# Patient Record
Sex: FEMALE | Race: WHITE | NOT HISPANIC OR LATINO | Employment: PART TIME | URBAN - METROPOLITAN AREA
[De-identification: names, ages, dates, MRNs, and addresses within clinical notes are randomized per-mention and may not be internally consistent; named-entity substitution may affect disease eponyms.]

---

## 2020-03-19 ENCOUNTER — HOSPITAL ENCOUNTER (EMERGENCY)
Facility: HOSPITAL | Age: 40
Discharge: HOME | End: 2020-03-19
Attending: EMERGENCY MEDICINE | Admitting: EMERGENCY MEDICINE

## 2020-03-19 ENCOUNTER — APPOINTMENT (EMERGENCY)
Dept: CT IMAGING | Facility: HOSPITAL | Age: 40
End: 2020-03-19

## 2020-03-19 VITALS
RESPIRATION RATE: 18 BRPM | OXYGEN SATURATION: 98 % | DIASTOLIC BLOOD PRESSURE: 102 MMHG | SYSTOLIC BLOOD PRESSURE: 178 MMHG | TEMPERATURE: 98.2 F | HEART RATE: 93 BPM | WEIGHT: 253.75 LBS

## 2020-03-19 DIAGNOSIS — M54.9 BACK PAIN: ICD-10-CM

## 2020-03-19 DIAGNOSIS — N83.202 CYST OF LEFT OVARY: ICD-10-CM

## 2020-03-19 DIAGNOSIS — R68.89 DOES NOT FEEL RIGHT: ICD-10-CM

## 2020-03-19 DIAGNOSIS — I10 HTN (HYPERTENSION): ICD-10-CM

## 2020-03-19 DIAGNOSIS — R42 DIZZINESS: Primary | ICD-10-CM

## 2020-03-19 DIAGNOSIS — R07.9 CHEST PAIN: ICD-10-CM

## 2020-03-19 LAB
ALBUMIN SERPL BCP-MCNC: 3.6 G/DL (ref 3.5–5)
ALP SERPL-CCNC: 64 U/L (ref 46–116)
ALT SERPL W P-5'-P-CCNC: 136 U/L (ref 12–78)
ANION GAP SERPL CALCULATED.3IONS-SCNC: 9 MMOL/L (ref 4–13)
AST SERPL W P-5'-P-CCNC: 97 U/L (ref 5–45)
ATRIAL RATE: 76 BPM
BACTERIA UR QL AUTO: ABNORMAL /HPF
BASOPHILS # BLD AUTO: 0.04 THOUSANDS/ΜL (ref 0–0.1)
BASOPHILS NFR BLD AUTO: 1 % (ref 0–1)
BILIRUB SERPL-MCNC: 0.4 MG/DL (ref 0.2–1)
BILIRUB UR QL STRIP: NEGATIVE
BUN SERPL-MCNC: 8 MG/DL (ref 5–25)
CALCIUM SERPL-MCNC: 9 MG/DL (ref 8.3–10.1)
CHLORIDE SERPL-SCNC: 101 MMOL/L (ref 100–108)
CLARITY UR: CLEAR
CO2 SERPL-SCNC: 27 MMOL/L (ref 21–32)
COLOR UR: YELLOW
CREAT SERPL-MCNC: 0.71 MG/DL (ref 0.6–1.3)
EOSINOPHIL # BLD AUTO: 0.08 THOUSAND/ΜL (ref 0–0.61)
EOSINOPHIL NFR BLD AUTO: 1 % (ref 0–6)
ERYTHROCYTE [DISTWIDTH] IN BLOOD BY AUTOMATED COUNT: 12.2 % (ref 11.6–15.1)
EXT PREG TEST URINE: NEGATIVE
EXT. CONTROL ED NAV: NORMAL
GFR SERPL CREATININE-BSD FRML MDRD: 106 ML/MIN/1.73SQ M
GLUCOSE SERPL-MCNC: 89 MG/DL (ref 65–140)
GLUCOSE UR STRIP-MCNC: NEGATIVE MG/DL
HCT VFR BLD AUTO: 45.6 % (ref 34.8–46.1)
HGB BLD-MCNC: 15.9 G/DL (ref 11.5–15.4)
HGB UR QL STRIP.AUTO: ABNORMAL
HYALINE CASTS #/AREA URNS LPF: ABNORMAL /LPF
IMM GRANULOCYTES # BLD AUTO: 0.03 THOUSAND/UL (ref 0–0.2)
IMM GRANULOCYTES NFR BLD AUTO: 0 % (ref 0–2)
KETONES UR STRIP-MCNC: NEGATIVE MG/DL
LEUKOCYTE ESTERASE UR QL STRIP: NEGATIVE
LYMPHOCYTES # BLD AUTO: 2.15 THOUSANDS/ΜL (ref 0.6–4.47)
LYMPHOCYTES NFR BLD AUTO: 26 % (ref 14–44)
MCH RBC QN AUTO: 35 PG (ref 26.8–34.3)
MCHC RBC AUTO-ENTMCNC: 34.9 G/DL (ref 31.4–37.4)
MCV RBC AUTO: 100 FL (ref 82–98)
MONOCYTES # BLD AUTO: 0.64 THOUSAND/ΜL (ref 0.17–1.22)
MONOCYTES NFR BLD AUTO: 8 % (ref 4–12)
NEUTROPHILS # BLD AUTO: 5.33 THOUSANDS/ΜL (ref 1.85–7.62)
NEUTS SEG NFR BLD AUTO: 64 % (ref 43–75)
NITRITE UR QL STRIP: NEGATIVE
NON-SQ EPI CELLS URNS QL MICRO: ABNORMAL /HPF
NRBC BLD AUTO-RTO: 0 /100 WBCS
P AXIS: 60 DEGREES
PH UR STRIP.AUTO: 6 [PH]
PLATELET # BLD AUTO: 188 THOUSANDS/UL (ref 149–390)
PMV BLD AUTO: 9.5 FL (ref 8.9–12.7)
POTASSIUM SERPL-SCNC: 4 MMOL/L (ref 3.5–5.3)
PR INTERVAL: 150 MS
PROT SERPL-MCNC: 7.7 G/DL (ref 6.4–8.2)
PROT UR STRIP-MCNC: NEGATIVE MG/DL
QRS AXIS: 34 DEGREES
QRSD INTERVAL: 80 MS
QT INTERVAL: 386 MS
QTC INTERVAL: 434 MS
RBC # BLD AUTO: 4.54 MILLION/UL (ref 3.81–5.12)
RBC #/AREA URNS AUTO: ABNORMAL /HPF
SODIUM SERPL-SCNC: 137 MMOL/L (ref 136–145)
SP GR UR STRIP.AUTO: <=1.005 (ref 1–1.03)
T WAVE AXIS: 65 DEGREES
UROBILINOGEN UR QL STRIP.AUTO: 0.2 E.U./DL
VENTRICULAR RATE: 76 BPM
WBC # BLD AUTO: 8.27 THOUSAND/UL (ref 4.31–10.16)
WBC #/AREA URNS AUTO: ABNORMAL /HPF

## 2020-03-19 PROCEDURE — 96361 HYDRATE IV INFUSION ADD-ON: CPT

## 2020-03-19 PROCEDURE — 93010 ELECTROCARDIOGRAM REPORT: CPT | Performed by: INTERNAL MEDICINE

## 2020-03-19 PROCEDURE — 99284 EMERGENCY DEPT VISIT MOD MDM: CPT

## 2020-03-19 PROCEDURE — 85025 COMPLETE CBC W/AUTO DIFF WBC: CPT | Performed by: NURSE PRACTITIONER

## 2020-03-19 PROCEDURE — 80053 COMPREHEN METABOLIC PANEL: CPT | Performed by: NURSE PRACTITIONER

## 2020-03-19 PROCEDURE — 36415 COLL VENOUS BLD VENIPUNCTURE: CPT | Performed by: NURSE PRACTITIONER

## 2020-03-19 PROCEDURE — 74177 CT ABD & PELVIS W/CONTRAST: CPT

## 2020-03-19 PROCEDURE — 93005 ELECTROCARDIOGRAM TRACING: CPT

## 2020-03-19 PROCEDURE — 96374 THER/PROPH/DIAG INJ IV PUSH: CPT

## 2020-03-19 PROCEDURE — 81025 URINE PREGNANCY TEST: CPT | Performed by: NURSE PRACTITIONER

## 2020-03-19 PROCEDURE — 81001 URINALYSIS AUTO W/SCOPE: CPT | Performed by: NURSE PRACTITIONER

## 2020-03-19 PROCEDURE — 99285 EMERGENCY DEPT VISIT HI MDM: CPT | Performed by: NURSE PRACTITIONER

## 2020-03-19 RX ORDER — MECLIZINE HYDROCHLORIDE 25 MG/1
50 TABLET ORAL ONCE
Status: COMPLETED | OUTPATIENT
Start: 2020-03-19 | End: 2020-03-19

## 2020-03-19 RX ORDER — MECLIZINE HYDROCHLORIDE 25 MG/1
25 TABLET ORAL 3 TIMES DAILY PRN
Qty: 15 TABLET | Refills: 0 | Status: SHIPPED | OUTPATIENT
Start: 2020-03-19 | End: 2020-03-19 | Stop reason: SDUPTHER

## 2020-03-19 RX ORDER — MECLIZINE HYDROCHLORIDE 25 MG/1
25 TABLET ORAL 3 TIMES DAILY PRN
Qty: 15 TABLET | Refills: 0 | Status: SHIPPED | OUTPATIENT
Start: 2020-03-19

## 2020-03-19 RX ORDER — KETOROLAC TROMETHAMINE 30 MG/ML
15 INJECTION, SOLUTION INTRAMUSCULAR; INTRAVENOUS ONCE
Status: COMPLETED | OUTPATIENT
Start: 2020-03-19 | End: 2020-03-19

## 2020-03-19 RX ADMIN — SODIUM CHLORIDE 1000 ML: 0.9 INJECTION, SOLUTION INTRAVENOUS at 13:38

## 2020-03-19 RX ADMIN — KETOROLAC TROMETHAMINE 15 MG: 30 INJECTION, SOLUTION INTRAMUSCULAR at 15:38

## 2020-03-19 RX ADMIN — IOHEXOL 100 ML: 350 INJECTION, SOLUTION INTRAVENOUS at 14:11

## 2020-03-19 RX ADMIN — MECLIZINE HYDROCHLORIDE 50 MG: 25 TABLET ORAL at 15:37

## 2020-03-19 NOTE — ED PROVIDER NOTES
History  Chief Complaint   Patient presents with    Back Pain     Patient c/o lower back pain x 1 week,with frequent urination  Dizziness, chest pain and worsening headaches x 3 days    Dizziness     This is a 53-year-old female who presents here with multiple complaints including low back pain dizziness with position changes and turning her head and standing up  Also reports that her blood pressure as high and just a generalized sense that she does not feel well and she has not felt well for months  She denies any fever chills and does not appear toxic      Back Pain   Associated symptoms: chest pain    Associated symptoms: no abdominal pain, no dysuria, no fever, no headaches, no pelvic pain and no weakness    Dizziness   Associated symptoms: chest pain    Associated symptoms: no diarrhea, no headaches, no nausea, no shortness of breath, no vomiting and no weakness        None       History reviewed  No pertinent past medical history  Past Surgical History:   Procedure Laterality Date    HYSTERECTOMY         History reviewed  No pertinent family history  I have reviewed and agree with the history as documented  E-Cigarette/Vaping    E-Cigarette Use Never User      E-Cigarette/Vaping Substances     Social History     Tobacco Use    Smoking status: Current Every Day Smoker    Smokeless tobacco: Never Used   Substance Use Topics    Alcohol use: Yes     Frequency: 4 or more times a week    Drug use: Yes     Types: Marijuana       Review of Systems   Constitutional: Negative for activity change, fatigue and fever  HENT: Negative for congestion, ear pain, rhinorrhea and sore throat  Eyes: Negative  Respiratory: Negative for cough, shortness of breath and wheezing  Cardiovascular: Positive for chest pain  Gastrointestinal: Negative for abdominal pain, diarrhea, nausea and vomiting  Endocrine: Negative      Genitourinary: Negative for difficulty urinating, dyspareunia, dysuria, flank pain, frequency, menstrual problem, pelvic pain, urgency, vaginal bleeding, vaginal discharge and vaginal pain  Musculoskeletal: Positive for back pain  Negative for arthralgias and myalgias  Skin: Negative for color change and pallor  Neurological: Positive for dizziness and light-headedness  Negative for speech difficulty, weakness and headaches  Hematological: Negative for adenopathy  Psychiatric/Behavioral: Negative for confusion  Physical Exam  Physical Exam   Constitutional: She is oriented to person, place, and time  She appears well-developed and well-nourished  She is cooperative  Non-toxic appearance  She does not have a sickly appearance  She does not appear ill  No distress  HENT:   Head: Normocephalic and atraumatic  Right Ear: Tympanic membrane and external ear normal    Left Ear: Tympanic membrane and external ear normal    Nose: No rhinorrhea, sinus tenderness or nasal deformity  No epistaxis  Right sinus exhibits no maxillary sinus tenderness and no frontal sinus tenderness  Left sinus exhibits no maxillary sinus tenderness and no frontal sinus tenderness  Mouth/Throat: Oropharynx is clear and moist and mucous membranes are normal  Normal dentition  Eyes: Pupils are equal, round, and reactive to light  EOM are normal    Neck: Normal range of motion  Neck supple  Cardiovascular: Normal rate, regular rhythm and normal heart sounds  No murmur heard  Pulmonary/Chest: Effort normal and breath sounds normal  No accessory muscle usage  No respiratory distress  She has no wheezes  She has no rales  She exhibits no tenderness  Abdominal: Soft  She exhibits no distension  There is no guarding  Musculoskeletal: Normal range of motion  She exhibits no edema or tenderness  Lymphadenopathy:     She has no cervical adenopathy  Neurological: She is alert and oriented to person, place, and time  She exhibits normal muscle tone  Skin: Skin is warm and dry  No rash noted  No erythema  Psychiatric: She has a normal mood and affect  Nursing note and vitals reviewed        Vital Signs  ED Triage Vitals [03/19/20 1310]   Temperature Pulse Respirations Blood Pressure SpO2   98 2 °F (36 8 °C) 93 18 (!) 178/102 98 %      Temp Source Heart Rate Source Patient Position - Orthostatic VS BP Location FiO2 (%)   Oral Monitor Lying Right arm --      Pain Score       --           Vitals:    03/19/20 1310   BP: (!) 178/102   Pulse: 93   Patient Position - Orthostatic VS: Lying         Visual Acuity      ED Medications  Medications   ketorolac (TORADOL) injection 15 mg (has no administration in time range)   meclizine (ANTIVERT) tablet 50 mg (has no administration in time range)   sodium chloride 0 9 % bolus 1,000 mL (0 mL Intravenous Stopped 3/19/20 1532)   iohexol (OMNIPAQUE) 350 MG/ML injection (MULTI-DOSE) 100 mL (100 mL Intravenous Given 3/19/20 1411)       Diagnostic Studies  Results Reviewed     Procedure Component Value Units Date/Time    Comprehensive metabolic panel [[id number]]  (Abnormal) Collected:  03/19/20 1337    Lab Status:  Final result Specimen:  Blood from Arm, Right Updated:  03/19/20 1402     Sodium 137 mmol/L      Potassium 4 0 mmol/L      Chloride 101 mmol/L      CO2 27 mmol/L      ANION GAP 9 mmol/L      BUN 8 mg/dL      Creatinine 0 71 mg/dL      Glucose 89 mg/dL      Calcium 9 0 mg/dL      AST 97 U/L       U/L      Alkaline Phosphatase 64 U/L      Total Protein 7 7 g/dL      Albumin 3 6 g/dL      Total Bilirubin 0 40 mg/dL      eGFR 106 ml/min/1 73sq m     Narrative:       Romel guidelines for Chronic Kidney Disease (CKD):     Stage 1 with normal or high GFR (GFR > 90 mL/min/1 73 square meters)    Stage 2 Mild CKD (GFR = 60-89 mL/min/1 73 square meters)    Stage 3A Moderate CKD (GFR = 45-59 mL/min/1 73 square meters)    Stage 3B Moderate CKD (GFR = 30-44 mL/min/1 73 square meters)    Stage 4 Severe CKD (GFR = 15-29 mL/min/1 73 square meters)   Stage 5 End Stage CKD (GFR <15 mL/min/1 73 square meters)  Note: GFR calculation is accurate only with a steady state creatinine    Urine Microscopic [[id number]]  (Abnormal) Collected:  03/19/20 1338    Lab Status:  Final result Specimen:  Urine, Clean Catch Updated:  03/19/20 1400     RBC, UA None Seen /hpf      WBC, UA None Seen /hpf      Epithelial Cells None Seen /hpf      Bacteria, UA Occasional /hpf      Hyaline Casts, UA 2-4 /lpf     POCT pregnancy, urine [[id number]]  (Normal) Resulted:  03/19/20 1348    Lab Status:  Final result Updated:  03/19/20 1348     EXT PREG TEST UR (Ref: Negative) negative     Control valid    UA w Reflex to Microscopic w Reflex to Culture [[id number]]  (Abnormal) Collected:  03/19/20 1338    Lab Status:  Final result Specimen:  Urine, Clean Catch Updated:  03/19/20 1348     Color, UA Yellow     Clarity, UA Clear     Specific Gravity, UA <=1 005     pH, UA 6 0     Leukocytes, UA Negative     Nitrite, UA Negative     Protein, UA Negative mg/dl      Glucose, UA Negative mg/dl      Ketones, UA Negative mg/dl      Urobilinogen, UA 0 2 E U /dl      Bilirubin, UA Negative     Blood, UA Trace-Intact    CBC and differential [[id number]]  (Abnormal) Collected:  03/19/20 1337    Lab Status:  Final result Specimen:  Blood from Arm, Right Updated:  03/19/20 1345     WBC 8 27 Thousand/uL      RBC 4 54 Million/uL      Hemoglobin 15 9 g/dL      Hematocrit 45 6 %       fL      MCH 35 0 pg      MCHC 34 9 g/dL      RDW 12 2 %      MPV 9 5 fL      Platelets 749 Thousands/uL      nRBC 0 /100 WBCs      Neutrophils Relative 64 %      Immat GRANS % 0 %      Lymphocytes Relative 26 %      Monocytes Relative 8 %      Eosinophils Relative 1 %      Basophils Relative 1 %      Neutrophils Absolute 5 33 Thousands/µL      Immature Grans Absolute 0 03 Thousand/uL      Lymphocytes Absolute 2 15 Thousands/µL      Monocytes Absolute 0 64 Thousand/µL      Eosinophils Absolute 0 08 Thousand/µL      Basophils Absolute 0 04 Thousands/µL                  CT abdomen pelvis with contrast   Final Result by Aylin Cevallos DO (03/19 6484)   Mild hepatosplenomegaly  2 6 cm left ovarian cyst    No CT explanation for patient's symptoms  Workstation performed: [id number]                    Procedures  Procedures         ED Course                                 MDM  Number of Diagnoses or Management Options  Back pain: new and requires workup  Chest pain: new and requires workup  Cyst of left ovary: new and requires workup  Dizziness: new and requires workup  Does not feel right: new and requires workup  HTN (hypertension): new and requires workup     Amount and/or Complexity of Data Reviewed  Clinical lab tests: reviewed and ordered  Tests in the radiology section of CPT®: ordered and reviewed  Tests in the medicine section of CPT®: ordered and reviewed  Independent visualization of images, tracings, or specimens: yes    Patient Progress  Patient progress: stable        Disposition  Final diagnoses:   Dizziness   Back pain   Cyst of left ovary   Chest pain   HTN (hypertension)   Does not feel right     Time reflects when diagnosis was documented in both MDM as applicable and the Disposition within this note     Time User Action Codes Description Comment    3/19/2020  3:25 PM Karon Netter Add [R42] Dizziness     3/19/2020  3:25 PM Karon Netter Add [M54 9] Back pain     3/19/2020  3:26 PM Karon Netter Add [N83 202] Cyst of left ovary     3/19/2020  3:26 PM Karon Netter Add [R07 9] Chest pain     3/19/2020  3:26 PM Karon Netter Add [I10] HTN (hypertension)     3/19/2020  3:26 PM Karon Netter Add [R68 89] Does not feel right       ED Disposition     ED Disposition Condition Date/Time Comment    Discharge Stable u Mar 19, 2020  3:25 PM Olvin Leaver discharge to home/self care              Follow-up Information     Follow up With Specialties Details Why [address],  Internal Medicine Schedule an appointment as soon as possible for a visit  To establish a primary care provider [facility]  [address] [place] [address], DO Family Medicine  To establish a primary care provider [facility]  [phone]            Patient's Medications   Discharge Prescriptions    MECLIZINE (ANTIVERT) 25 MG TABLET    Take 1 tablet (25 mg total) by mouth 3 (three) times a day as needed for dizziness for up to 15 doses       Start Date: 3/19/2020 End Date: --       Order Dose: 25 mg       Quantity: 15 tablet    Refills: 0     No discharge procedures on file      PDMP Review     None          ED Provider  Electronically Signed by           CECE Ott  03/19/20 7523

## 2021-04-12 ENCOUNTER — APPOINTMENT (EMERGENCY)
Dept: MRI IMAGING | Facility: HOSPITAL | Age: 41
End: 2021-04-12
Payer: MEDICARE

## 2021-04-12 ENCOUNTER — APPOINTMENT (EMERGENCY)
Dept: CT IMAGING | Facility: HOSPITAL | Age: 41
End: 2021-04-12
Payer: MEDICARE

## 2021-04-12 ENCOUNTER — HOSPITAL ENCOUNTER (EMERGENCY)
Facility: HOSPITAL | Age: 41
Discharge: HOME | End: 2021-04-12
Attending: EMERGENCY MEDICINE | Admitting: EMERGENCY MEDICINE
Payer: MEDICARE

## 2021-04-12 VITALS
OXYGEN SATURATION: 96 % | TEMPERATURE: 98.3 F | WEIGHT: 253.53 LBS | SYSTOLIC BLOOD PRESSURE: 157 MMHG | DIASTOLIC BLOOD PRESSURE: 92 MMHG | HEART RATE: 78 BPM | RESPIRATION RATE: 18 BRPM

## 2021-04-12 DIAGNOSIS — D32.9 MENINGIOMA (HCC): ICD-10-CM

## 2021-04-12 DIAGNOSIS — R42 DIZZINESS: ICD-10-CM

## 2021-04-12 DIAGNOSIS — G93.89 BRAIN MASS: Primary | ICD-10-CM

## 2021-04-12 LAB
ANION GAP SERPL CALCULATED.3IONS-SCNC: 10 MMOL/L (ref 4–13)
ATRIAL RATE: 61 BPM
BASOPHILS # BLD AUTO: 0.05 THOUSANDS/ΜL (ref 0–0.1)
BASOPHILS NFR BLD AUTO: 1 % (ref 0–1)
BUN SERPL-MCNC: 11 MG/DL (ref 5–25)
CALCIUM SERPL-MCNC: 8.6 MG/DL (ref 8.3–10.1)
CHLORIDE SERPL-SCNC: 105 MMOL/L (ref 100–108)
CO2 SERPL-SCNC: 24 MMOL/L (ref 21–32)
CREAT SERPL-MCNC: 0.78 MG/DL (ref 0.6–1.3)
EOSINOPHIL # BLD AUTO: 0.07 THOUSAND/ΜL (ref 0–0.61)
EOSINOPHIL NFR BLD AUTO: 1 % (ref 0–6)
ERYTHROCYTE [DISTWIDTH] IN BLOOD BY AUTOMATED COUNT: 12.5 % (ref 11.6–15.1)
EXT PREG TEST URINE: NEGATIVE
EXT. CONTROL ED NAV: NORMAL
GFR SERPL CREATININE-BSD FRML MDRD: 94 ML/MIN/1.73SQ M
GLUCOSE SERPL-MCNC: 100 MG/DL (ref 65–140)
HCT VFR BLD AUTO: 44.3 % (ref 34.8–46.1)
HGB BLD-MCNC: 15.2 G/DL (ref 11.5–15.4)
IMM GRANULOCYTES # BLD AUTO: 0.03 THOUSAND/UL (ref 0–0.2)
IMM GRANULOCYTES NFR BLD AUTO: 0 % (ref 0–2)
LYMPHOCYTES # BLD AUTO: 2.41 THOUSANDS/ΜL (ref 0.6–4.47)
LYMPHOCYTES NFR BLD AUTO: 31 % (ref 14–44)
MCH RBC QN AUTO: 34.2 PG (ref 26.8–34.3)
MCHC RBC AUTO-ENTMCNC: 34.3 G/DL (ref 31.4–37.4)
MCV RBC AUTO: 100 FL (ref 82–98)
MONOCYTES # BLD AUTO: 0.7 THOUSAND/ΜL (ref 0.17–1.22)
MONOCYTES NFR BLD AUTO: 9 % (ref 4–12)
NEUTROPHILS # BLD AUTO: 4.46 THOUSANDS/ΜL (ref 1.85–7.62)
NEUTS SEG NFR BLD AUTO: 58 % (ref 43–75)
NRBC BLD AUTO-RTO: 0 /100 WBCS
P AXIS: 50 DEGREES
PLATELET # BLD AUTO: 190 THOUSANDS/UL (ref 149–390)
PMV BLD AUTO: 9.9 FL (ref 8.9–12.7)
POTASSIUM SERPL-SCNC: 3.7 MMOL/L (ref 3.5–5.3)
PR INTERVAL: 152 MS
QRS AXIS: 9 DEGREES
QRSD INTERVAL: 88 MS
QT INTERVAL: 462 MS
QTC INTERVAL: 465 MS
RBC # BLD AUTO: 4.45 MILLION/UL (ref 3.81–5.12)
SODIUM SERPL-SCNC: 139 MMOL/L (ref 136–145)
T WAVE AXIS: 69 DEGREES
TROPONIN I SERPL-MCNC: <0.02 NG/ML
TSH SERPL DL<=0.05 MIU/L-ACNC: 4.25 UIU/ML (ref 0.36–3.74)
VENTRICULAR RATE: 61 BPM
WBC # BLD AUTO: 7.72 THOUSAND/UL (ref 4.31–10.16)

## 2021-04-12 PROCEDURE — 70450 CT HEAD/BRAIN W/O DYE: CPT

## 2021-04-12 PROCEDURE — NC001 PR NO CHARGE: Performed by: PHYSICIAN ASSISTANT

## 2021-04-12 PROCEDURE — 80048 BASIC METABOLIC PNL TOTAL CA: CPT | Performed by: PHYSICIAN ASSISTANT

## 2021-04-12 PROCEDURE — 84443 ASSAY THYROID STIM HORMONE: CPT | Performed by: PHYSICIAN ASSISTANT

## 2021-04-12 PROCEDURE — 85025 COMPLETE CBC W/AUTO DIFF WBC: CPT | Performed by: PHYSICIAN ASSISTANT

## 2021-04-12 PROCEDURE — 96376 TX/PRO/DX INJ SAME DRUG ADON: CPT

## 2021-04-12 PROCEDURE — 99284 EMERGENCY DEPT VISIT MOD MDM: CPT

## 2021-04-12 PROCEDURE — 81025 URINE PREGNANCY TEST: CPT | Performed by: PHYSICIAN ASSISTANT

## 2021-04-12 PROCEDURE — 93010 ELECTROCARDIOGRAM REPORT: CPT | Performed by: INTERNAL MEDICINE

## 2021-04-12 PROCEDURE — 96375 TX/PRO/DX INJ NEW DRUG ADDON: CPT

## 2021-04-12 PROCEDURE — A9585 GADOBUTROL INJECTION: HCPCS | Performed by: PHYSICIAN ASSISTANT

## 2021-04-12 PROCEDURE — 84484 ASSAY OF TROPONIN QUANT: CPT | Performed by: PHYSICIAN ASSISTANT

## 2021-04-12 PROCEDURE — 99285 EMERGENCY DEPT VISIT HI MDM: CPT | Performed by: PHYSICIAN ASSISTANT

## 2021-04-12 PROCEDURE — 36415 COLL VENOUS BLD VENIPUNCTURE: CPT | Performed by: PHYSICIAN ASSISTANT

## 2021-04-12 PROCEDURE — 70553 MRI BRAIN STEM W/O & W/DYE: CPT

## 2021-04-12 PROCEDURE — 93005 ELECTROCARDIOGRAM TRACING: CPT

## 2021-04-12 PROCEDURE — 96374 THER/PROPH/DIAG INJ IV PUSH: CPT

## 2021-04-12 RX ORDER — ONDANSETRON 2 MG/ML
4 INJECTION INTRAMUSCULAR; INTRAVENOUS ONCE
Status: COMPLETED | OUTPATIENT
Start: 2021-04-12 | End: 2021-04-12

## 2021-04-12 RX ORDER — DIAZEPAM 5 MG/ML
5 INJECTION, SOLUTION INTRAMUSCULAR; INTRAVENOUS ONCE
Status: COMPLETED | OUTPATIENT
Start: 2021-04-12 | End: 2021-04-12

## 2021-04-12 RX ORDER — ONDANSETRON 4 MG/1
4 TABLET, ORALLY DISINTEGRATING ORAL EVERY 6 HOURS PRN
Qty: 20 TABLET | Refills: 0 | Status: SHIPPED | OUTPATIENT
Start: 2021-04-12

## 2021-04-12 RX ORDER — FENTANYL CITRATE 50 UG/ML
100 INJECTION, SOLUTION INTRAMUSCULAR; INTRAVENOUS ONCE
Status: COMPLETED | OUTPATIENT
Start: 2021-04-12 | End: 2021-04-12

## 2021-04-12 RX ORDER — OXYCODONE HYDROCHLORIDE AND ACETAMINOPHEN 5; 325 MG/1; MG/1
1 TABLET ORAL EVERY 6 HOURS PRN
Qty: 20 TABLET | Refills: 0 | Status: SHIPPED | OUTPATIENT
Start: 2021-04-12 | End: 2021-04-15

## 2021-04-12 RX ORDER — MECLIZINE HYDROCHLORIDE 25 MG/1
25 TABLET ORAL ONCE
Status: COMPLETED | OUTPATIENT
Start: 2021-04-12 | End: 2021-04-12

## 2021-04-12 RX ADMIN — GADOBUTROL 11 ML: 604.72 INJECTION INTRAVENOUS at 11:16

## 2021-04-12 RX ADMIN — FENTANYL CITRATE 100 MCG: 50 INJECTION, SOLUTION INTRAMUSCULAR; INTRAVENOUS at 12:35

## 2021-04-12 RX ADMIN — ONDANSETRON 4 MG: 2 INJECTION INTRAMUSCULAR; INTRAVENOUS at 09:56

## 2021-04-12 RX ADMIN — ONDANSETRON 4 MG: 2 INJECTION INTRAMUSCULAR; INTRAVENOUS at 12:35

## 2021-04-12 RX ADMIN — MECLIZINE HYDROCHLORIDE 25 MG: 25 TABLET ORAL at 09:54

## 2021-04-12 RX ADMIN — DIAZEPAM 5 MG: 10 INJECTION, SOLUTION INTRAMUSCULAR; INTRAVENOUS at 09:56

## 2021-04-12 NOTE — TELEMEDICINE
On-Call Telephone Note    Contacted by Jesus Wiggins at [employer]  Flower Doe 41 y o  female MRN: [id number]  Unit/Bed#: ED 14 Encounter: [id number]    Per provider report, patient presents with 1 month history of vertigo, worse over the past 4 days  Per report, she denies nausea or vomiting and has not syncopized  Any movement exacerbates her symptoms  Vertigo not improved with meclizine  Available past medical history,social history, surgical history, medication list, drug allergies and review of systems were reviewed  /83   Pulse 72   Temp 98 3 °F (36 8 °C)   Resp 16   Wt 115 kg (253 lb 8 5 oz)   SpO2 98%      Clinical exam per provider report, no cranial nerve deficits  Some visual disturbance with extreme left horizontal gaze  Abnormal gait but no dysmetria  Strength and sensation intact  Imaging personally reviewed  CT head w/o, 4/12/21: 2 3cm partially calcified mass left CP angle with some mass effect  MRI brain w/wo, 4/12/21: same as CT head  Mass effect with compression of left lauren, no extension into cavernous sinus or meckel's cave  No edema appreciated on FLAIR images  Assessment and Plan  1  Reviewed case and images with attending  2  At this time, recommend outpatient follow up in the next 1-2 weeks  Appointment will be scheduled today and patient notified by our office  3  Ok to discharge home  4  Return to ED with any new or worsening symptoms    All questions answered  Provider is in agreement with the course of action  A total of 15 minutes was spent discussing the presentation, physical exam and formulating a management plan with the provider

## 2021-04-12 NOTE — Clinical Note
Rommel Sawyer was seen and treated in our emergency department on 4/12/2021  Diagnosis:     Carolin Perez    She may return on this date: 04/19/2021         If you have any questions or concerns, please don't hesitate to call        Lyric Oliver RN    ______________________________           _______________          _______________  Hospital Representative                              Date                                Time

## 2021-04-12 NOTE — ED PROVIDER NOTES
History  Chief Complaint   Patient presents with    Dizziness     pt with dizziness for weeks      41 y o  female with past medical history significant for migraine headaches presents to ED with chief complaint of dizziness  Onset of symptoms reported as 1 month ago, worse over the past 4 days  Location of symptoms reported as head  Quality is reported as sensation of the room spinning around  Severity is reported as moderate  Associated symptoms: denies nausea or vomiting, denies syncope, denies chest pain or sob  Denies fevers  Modifying factors: movement/changes of position exacerbates symptoms    Context:  Patient reports a history of vertigo over the past month, worse acutely over the past 4 days  I had previously prescribed meclizine which has not improved her symptoms  Has an appointment with her primary care physician in 4 days for further evaluation of symptoms I feel they are too severe to wait until that time  Denies recent fall injury or trauma  Denies associated chest pain, palpitations  Denies history of diabetes or blood sugar issues  States symptoms are there constantly  They feel like the room is spinning around every time she gets up to move she feels like she is off-balance and going to fall  Denies any extremity weakness  Patient is a smoker  Reviewed past medical history and visits via [facility]:  Old charts reviewed:  Patient last seen in the emergency department on 03/19/2020 for evaluation of dizziness  History provided by:  Patient and significant other   used: No    Dizziness  Associated symptoms: no blood in stool, no chest pain, no diarrhea, no headaches, no hearing loss, no nausea, no palpitations, no shortness of breath, no tinnitus, no vomiting and no weakness        Prior to Admission Medications   Prescriptions Last Dose Informant Patient Reported?  Taking?   meclizine (ANTIVERT) 25 mg tablet   No No   Sig: Take 1 tablet (25 mg total) by mouth 3 (three) times a day as needed for dizziness for up to 15 doses      Facility-Administered Medications: None       History reviewed  No pertinent past medical history  Past Surgical History:   Procedure Laterality Date    HYSTERECTOMY         History reviewed  No pertinent family history  I have reviewed and agree with the history as documented  E-Cigarette/Vaping    E-Cigarette Use Never User      E-Cigarette/Vaping Substances     Social History     Tobacco Use    Smoking status: Current Every Day Smoker    Smokeless tobacco: Never Used   Substance Use Topics    Alcohol use: Yes     Frequency: 4 or more times a week    Drug use: Yes     Types: Marijuana       Review of Systems   Constitutional: Negative for activity change, appetite change, chills, diaphoresis, fatigue, fever and unexpected weight change  HENT: Negative for congestion, dental problem, drooling, ear discharge, ear pain, facial swelling, hearing loss, mouth sores, nosebleeds, postnasal drip, rhinorrhea, sinus pressure, sinus pain, sneezing, sore throat, tinnitus, trouble swallowing and voice change  Eyes: Negative for photophobia, pain, discharge, redness, itching and visual disturbance  Respiratory: Negative for apnea, cough, choking, chest tightness, shortness of breath, wheezing and stridor  Cardiovascular: Negative for chest pain, palpitations and leg swelling  Gastrointestinal: Negative for abdominal distention, abdominal pain, anal bleeding, blood in stool, constipation, diarrhea, nausea, rectal pain and vomiting  Endocrine: Negative for cold intolerance, heat intolerance, polydipsia, polyphagia and polyuria  Genitourinary: Negative for decreased urine volume, difficulty urinating, dyspareunia, dysuria, enuresis, flank pain, frequency, hematuria, menstrual problem, pelvic pain, urgency, vaginal bleeding, vaginal discharge and vaginal pain     Musculoskeletal: Negative for arthralgias, back pain, gait problem, joint swelling, myalgias, neck pain and neck stiffness  Skin: Negative for color change, pallor, rash and wound  Allergic/Immunologic: Negative for environmental allergies, food allergies and immunocompromised state  Neurological: Positive for dizziness  Negative for tremors, seizures, syncope, facial asymmetry, speech difficulty, weakness, light-headedness, numbness and headaches  Hematological: Negative for adenopathy  Does not bruise/bleed easily  Psychiatric/Behavioral: Negative for agitation, confusion, hallucinations, self-injury and suicidal ideas  The patient is not hyperactive  All other systems reviewed and are negative  Physical Exam  Physical Exam  Vitals signs and nursing note reviewed  Constitutional:       General: She is not in acute distress  Appearance: Normal appearance  Comments: /83   Pulse 72   Temp 98 3 °F (36 8 °C)   Resp 16   Wt 115 kg (253 lb 8 5 oz)   SpO2 98%      HENT:      Head: Normocephalic and atraumatic  Right Ear: External ear normal       Left Ear: External ear normal       Nose: Nose normal    Eyes:      General: No scleral icterus  Right eye: No discharge  Left eye: No discharge  Conjunctiva/sclera: Conjunctivae normal       Comments: There is some "fluttering" of eyes with extreme left gaze/ patient reports wavy distortion of lateral gaze field when looking to the extreme left gaze  Neck:      Musculoskeletal: Normal range of motion and neck supple  No neck rigidity or muscular tenderness  Cardiovascular:      Rate and Rhythm: Normal rate  Pulses: Normal pulses  Pulmonary:      Effort: Pulmonary effort is normal  No respiratory distress  Musculoskeletal: Normal range of motion  General: No tenderness, deformity or signs of injury  Skin:     Coloration: Skin is not jaundiced  Findings: No erythema or rash     Neurological:      Mental Status: She is alert and oriented to person, place, and time  Mental status is at baseline  Cranial Nerves: No cranial nerve deficit  Sensory: No sensory deficit  Comments: Cranial nerves 2-12 grossly intact  Visual acuity intact both eyes  No visual field cuts  EOMI - patient does report some wavy vision on extreme left gaze only  Speech normal/intact/coherent  Recall and identification intact/normal      Balance is compromised with walking/balancing  FTN testing intact  Sensory Function:  Upper extremities: bilaterally intact to superficial touch, pain, pressure and position  Lower extremities:  Bilaterally intact to superficial touch, pain, pressure and position    Reflexes:    DTR 2+ bilaterally at the knee and ankle  Plantar reflex produces plantar flexion of toes bilaterally  No ankle clonus present        Psychiatric:         Mood and Affect: Mood normal          Behavior: Behavior normal          Vital Signs  ED Triage Vitals   Temperature Pulse Respirations Blood Pressure SpO2   04/12/21 0907 04/12/21 0906 04/12/21 0906 04/12/21 0907 04/12/21 0906   98 3 °F (36 8 °C) 72 16 150/83 98 %      Temp src Heart Rate Source Patient Position - Orthostatic VS BP Location FiO2 (%)   -- -- -- -- --             Pain Score       --                  Vitals:    04/12/21 0906 04/12/21 0907 04/12/21 1030   BP:  150/83 157/92   Pulse: 72  78         Visual Acuity      ED Medications  Medications   diazepam (VALIUM) injection 5 mg (5 mg Intravenous Given 4/12/21 0956)   ondansetron (ZOFRAN) injection 4 mg (4 mg Intravenous Given 4/12/21 0956)   meclizine (ANTIVERT) tablet 25 mg (25 mg Oral Given 4/12/21 0954)   Gadobutrol injection (SINGLE-DOSE) SOLN 11 mL (11 mL Intravenous Given 4/12/21 1116)   fentanyl citrate (PF) 100 MCG/2ML 100 mcg (100 mcg Intravenous Given 4/12/21 1235)   ondansetron (ZOFRAN) injection 4 mg (4 mg Intravenous Given 4/12/21 1235)       Diagnostic Studies  Results Reviewed     Procedure Component Value Units Date/Time    POCT pregnancy, urine [[id number]]  (Normal) Resulted: 04/12/21 1107    Lab Status: Final result Updated: 04/12/21 1107     EXT PREG TEST UR (Ref: Negative) negative     Control valid    Basic metabolic panel [[id number]] Collected: 04/12/21 0944    Lab Status: Final result Specimen: Blood from Arm, Left Updated: 04/12/21 1014     Sodium 139 mmol/L      Potassium 3 7 mmol/L      Chloride 105 mmol/L      CO2 24 mmol/L      ANION GAP 10 mmol/L      BUN 11 mg/dL      Creatinine 0 78 mg/dL      Glucose 100 mg/dL      Calcium 8 6 mg/dL      eGFR 94 ml/min/1 73sq m     Narrative:      [place] guidelines for Chronic Kidney Disease (CKD):     Stage 1 with normal or high GFR (GFR > 90 mL/min/1 73 square meters)    Stage 2 Mild CKD (GFR = 60-89 mL/min/1 73 square meters)    Stage 3A Moderate CKD (GFR = 45-59 mL/min/1 73 square meters)    Stage 3B Moderate CKD (GFR = 30-44 mL/min/1 73 square meters)    Stage 4 Severe CKD (GFR = 15-29 mL/min/1 73 square meters)    Stage 5 End Stage CKD (GFR <15 mL/min/1 73 square meters)  Note: GFR calculation is accurate only with a steady state creatinine    TSH [[id number]]  (Abnormal) Collected: 04/12/21 0944    Lab Status: Final result Specimen: Blood from Arm, Left Updated: 04/12/21 1014     TSH 3RD GENERATON 4 251 uIU/mL     Narrative:      Patients undergoing fluorescein dye angiography may retain small amounts of fluorescein in the body for 48-72 hours post procedure  Samples containing fluorescein can produce falsely depressed TSH values  If the patient had this procedure,a specimen should be resubmitted post fluorescein clearance        Troponin I [[id number]]  (Normal) Collected: 04/12/21 0944    Lab Status: Final result Specimen: Blood from Arm, Left Updated: 04/12/21 1009     Troponin I <0 02 ng/mL     CBC and differential [[id number]]  (Abnormal) Collected: 04/12/21 0944    Lab Status: Final result Specimen: Blood from Arm, Left Updated: 04/12/21 9993 WBC 7 72 Thousand/uL      RBC 4 45 Million/uL      Hemoglobin 15 2 g/dL      Hematocrit 44 3 %       fL      MCH 34 2 pg      MCHC 34 3 g/dL      RDW 12 5 %      MPV 9 9 fL      Platelets 650 Thousands/uL      nRBC 0 /100 WBCs      Neutrophils Relative 58 %      Immat GRANS % 0 %      Lymphocytes Relative 31 %      Monocytes Relative 9 %      Eosinophils Relative 1 %      Basophils Relative 1 %      Neutrophils Absolute 4 46 Thousands/µL      Immature Grans Absolute 0 03 Thousand/uL      Lymphocytes Absolute 2 41 Thousands/µL      Monocytes Absolute 0 70 Thousand/µL      Eosinophils Absolute 0 07 Thousand/µL      Basophils Absolute 0 05 Thousands/µL                  MRI brain w wo contrast   Final Result by Sherice Tate MD (04/12 1218)      2 3 x 2 2 x 2 5 cm enhancing extra-axial mass in the left petroclival region 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      There is mass effect on the brainstem with compression of the left lauren      No extension of the mass into the cavernous sinus or in the Meckel's cave      Mass lies posterior to the cavernous left carotid artery, lateral to the basilar artery, and inferior to the left posterior cerebral artery      No additional mass seen            Workstation performed: [id number]         CT head without contrast   Final Result by Jonah Rodriges MD (04/12 1008)      2 3 cm partially calcified mass in the left cerebellopontine angle with regional mass effect  Recommend MRI for further evaluation                  I personally discussed this study with [address] [place] [address] on 4/12/2021 at 10:08 AM                   Workstation performed: [id number]                    Procedures  ECG 12 Lead Documentation Only    Date/Time: 4/12/2021 9:17 AM  Performed by: Freddy Lopez PA-C  Authorized by: Freddy Lopez PA-C     Indications / Diagnosis:  Dizziness  ECG reviewed by me, the ED Provider: yes    Patient location:  ED  Previous ECG:     Previous ECG: Compared to current    Comparison ECG info: Mar 19, 2020    Similarity:  No change    Comparison to cardiac monitor: Yes    Interpretation:     Interpretation: normal    Rate:     ECG rate:  61    ECG rate assessment: normal    Rhythm:     Rhythm: sinus rhythm    Ectopy:     Ectopy: none    QRS:     QRS axis:  Normal    QRS intervals:  Normal  Conduction:     Conduction: normal    ST segments:     ST segments:  Normal  T waves:     T waves: normal               ED Course  ED Course as of Apr 12 1448   Mon Apr 12, 2021   1036 Patient with 2 3 cm cerebellopontine mass on CT  Will pursue MRI for further characterization  HEART Risk Score      Most Recent Value   Heart Score Risk Calculator   History  0 Filed at: 04/12/2021 1444   ECG  0 Filed at: 04/12/2021 1444   Age  0 Filed at: 04/12/2021 1444   Risk Factors  1 Filed at: 04/12/2021 1444   Troponin  0 Filed at: 04/12/2021 1444   HEART Score  1 Filed at: 04/12/2021 1444                      SBIRT 20yo+      Most Recent Value   SBIRT (23 yo +)   In order to provide better care to our patients, we are screening all of our patients for alcohol and drug use  Would it be okay to ask you these screening questions? Yes Filed at: 04/12/2021 3836   Initial Alcohol Screen: US AUDIT-C    1  How often do you have a drink containing alcohol?  0 Filed at: 04/12/2021 0925   2  How many drinks containing alcohol do you have on a typical day you are drinking? 0 Filed at: 04/12/2021 0925   3a  Male UNDER 65: How often do you have five or more drinks on one occasion? 0 Filed at: 04/12/2021 0925   3b  FEMALE Any Age, or MALE 65+: How often do you have 4 or more drinks on one occassion? 0 Filed at: 04/12/2021 0925   Audit-C Score  0 Filed at: 04/12/2021 3234   LOULOU: How many times in the past year have you    Used an illegal drug or used a prescription medication for non-medical reasons?   Never Filed at: 04/12/2021 4014                    MDM  Number of Diagnoses or Management Options  Brain mass: new and requires workup  Dizziness: new and requires workup  Meningioma [facility]): new and requires workup  Diagnosis management comments: ddx includes but is not limited to peripheral vertigo, BPV, labyrinthitis, meniere disease, vestibule neuronitis, acoustic neuroma, cerebellar hemorrhage, vertebrobasilar insufficiency, tumor, MS, CVA, TIA, cardiac arrhythmia, anemia, [place], ACS, hypoglycemia, vasovagal syndrome, electrolyte abnormality, dehydration, infection  Plan workup including ekg to evaluate for arrhythmias, check labs, ct head  Lab results reviewed  Pregnancy test is negative  CBC demonstrates normal white blood cell count 7 7, hemoglobin 15 2 hematocrit 44 3 normal   No anemia  Basic metabolic panel was reviewed, BUN of 11 creatinine 0 78 are normal   No renal failure  TSH mildly elevated at 4 25  Troponin normal less than 0 02  CT scan of the head images independently visualized interpreted by me  Radiology report discussed with Dr Deidra Lewis via telephone:PARENCHYMA: Partially calcified 2 2 x 1 8 x 2 3 cm mass in the left cerebellopontine angle, which compresses the the left side of the lauren and midbrain   No midline shift  Decreased attenuation is noted in periventricular and subcortical white matter demonstrating an appearance that is statistically most likely to represent mild microangiopathic change   No CT signs of acute infarction   No acute parenchymal hemorrhage  VENTRICLES AND EXTRA-AXIAL SPACES: Left cerebellopontine angle mass, as above   No extra-axial collections   Normal ventricles  VISUALIZED ORBITS AND PARANASAL SINUSES:  Unremarkable       CALVARIUM AND EXTRACRANIAL SOFT TISSUES:  Normal      MRI brain results reviewed:   FINDINGS:     BRAIN PARENCHYMA: Gerardine Daniel is an extra-axial mass on the left side which arises in the region of the left petroclival ligament   This mass causes compression of the left side of the lauren   The basilar artery lies at the medial aspect of this mass   The   left trigeminal nerve is displaced and runs posterior to the mass   This mass measures 2 3 cm x 2 2 cm x 2 5 cm   This mass lies posterior to the Meckel's cave  Vidhi  is no definite extension of this mass into the Meckel's cave or into the cavernous   sinus   This mass demonstrates homogenous enhancement with the areas of calcification noted in this mass seen on the CT   There is no area of diffusion restriction to says acute infarct  Vidhi  is no additional enhancing lesion seen     There are no white matter changes in the cerebral hemispheres  VENTRICLES:  Normal for the patient's age  SELLA AND PITUITARY GLAND:  Normal      ORBITS:  Normal      PARANASAL SINUSES:  Chronic sinus disease right sphenoid sinus   VASCULATURE:  Evaluation of the major intracranial vasculature demonstrates appropriate flow voids  Left cavernous carotid artery lies at the anterior aspect of the mass there is no distortion or displacement of the left cavernous carotid   This mass lies inferior to left posterior cerebral artery which is displaced superiorly by this mass   CALVARIUM AND SKULL BASE:  Normal      EXTRACRANIAL SOFT TISSUES:  Normal         Amount and/or Complexity of Data Reviewed  Clinical lab tests: ordered and reviewed  Tests in the radiology section of CPT®: ordered and reviewed  Tests in the medicine section of CPT®: ordered and reviewed  Discussion of test results with the performing providers: yes  Obtain history from someone other than the patient: yes (Sig other)  Review and summarize past medical records: yes  Discuss the patient with other providers: yes (DR Enrique Gray, Radiology)  Independent visualization of images, tracings, or specimens: yes    Risk of Complications, Morbidity, and/or Mortality  General comments: ED course:  68-year-old female with past medical history significant for headaches presents to the emergency department with persistent dizziness  Patient starts symptoms started over 1 month ago  She states she has difficulty walking in a straight line and maintaining her balance  She feels that things are spinning around when she moves  She was seen previously and treated with meclizine without improvement in symptoms  She denies any fall injury or trauma recently  She presents today because she is unable to perform her regular activities due to the persistence and severity of her symptoms  She denies any blurred vision or loss of vision  She denies any upper lower extremity weakness or paralysis  No slurring of speech or facial droop  She states that she notices when she gets up to walk she feels like she can not walk in a straight line he feels like she is going to fall over  ED workup demonstrates normal troponin, EKG without ischemic changes or arrhythmia  No anemia renal failure on lab workup  TSH mildly elevated at 4 2 but likely to be contributing to symptoms  CT of the head shows a 2 3 cm mass in the left cerebellopontine area compressing the lauren and midbrain  This is likely source of patient's symptoms  Discussed results with radiologist   Will pursue MRI  Discussed with Neurosurgery MICHELLE GUILLEN  [address] regarding further evaluation  Will make further recommendations after discussion with attending and viewing MRI results  Discussed MRI results with neurosurgery - care reviewed - recommended close outpatient follow up as outpatient for further evaluation and treatment of mass/concerning for meningioma  Discussed treatment plan including rest, avoidance of exertion activities  And follow-up with outpatient neuro surgery in the next 1-2 weeks  Discussed avoid activities that may result in head injury    Reviewed with patient reasons to return to the emergency department including but not limited to development of any worsening or new neurological symptoms, seizures, stroke-like symptoms, visual changes or any other worsening or worrisome symptoms  Discussed will prescribed analgesics pain medication for headache  Standard narcotic precautions given  Add zofran for nausea/vomiting  Follow up with pcp in 2-3 days and neurosurgery in 1-2 weeks for further evaluation treatment of symptoms  Reviewed reasons to return to ed  Patient verbalized understanding of diagnosis and agreement with discharge plan of care as well as understanding of reasons to return to ed          Patient was seen during the outbreak of the corona virus epidemic   Resources are limited due to the severity of patient illnesses associated with virus   Testing is also limited at this time   Discussed with patient at the time of this evaluation   Due to the fact that limited resources are available -treatment options are limited  Patient Progress  Patient progress: stable      Disposition  Final diagnoses:   Brain mass   Dizziness   Meningioma ([facility] [address] )     Time reflects when diagnosis was documented in both MDM as applicable and the Disposition within this note     Time User Action Codes Description Comment    4/12/2021 11:53 AM Mendoza Song Add [G93 89] Brain mass     4/12/2021 11:53 AM Mendoza Song Add [R42] Dizziness     4/12/2021  2:00 PM Mendoza Ragsdale Add [D32 9] Meningioma [facility])       ED Disposition     ED Disposition Condition Date/Time Comment    Discharge Stable Mon Apr 12, 2021  1:33 PM Lauri Sloan discharge to home/self care              Follow-up Information     Follow up With Specialties Details Why Contact Info Additional [address] [facility] Emergency Medicine Go to  If symptoms worsen [address] [facility] [id number] [facility], [place], [place], [facility] [address], MD Neurosurgery Call  for further evaluation of symptoms [facility] [place], [place] [id number] [phone]       Viky Kinney MD Family Medicine Call in 1 day for further evaluation of symptoms [address]  [phone]             Discharge Medication List as of 4/12/2021  2:02 PM      START taking these medications    Details   ondansetron (ZOFRAN-ODT) 4 mg disintegrating tablet Take 1 tablet (4 mg total) by mouth every 6 (six) hours as needed for nausea or vomiting for up to 20 doses, Starting Mon 4/12/2021, Normal      oxyCODONE-acetaminophen (PERCOCET) 5-325 mg per tablet Take 1 tablet by mouth every 6 (six) hours as needed for moderate pain or severe pain (headache/initial rx ) for up to 3 days Label no driving no etoh  Initial rx  Dx:Max Daily Amount: 4 tablets, Starting Mon 4/12/2021, Until Thu 4/15/2021, Normal         CONTINUE these medications which have NOT CHANGED    Details   meclizine (ANTIVERT) 25 mg tablet Take 1 tablet (25 mg total) by mouth 3 (three) times a day as needed for dizziness for up to 15 doses, Starting Thu 3/19/2020, Print           No discharge procedures on file      PDMP Review     None          ED Provider  Electronically Signed by           Yasmine Go PA-C  04/12/21 6554

## 2021-04-13 ENCOUNTER — TELEPHONE (OUTPATIENT)
Dept: NEUROSURGERY | Facility: CLINIC | Age: 41
End: 2021-04-13

## 2021-04-13 NOTE — TELEPHONE ENCOUNTER
04/13/2021-PER STEFANY, SHE RECEIVED MESSAGE FROM [employer] TO SCHEDULE PT W/DKO AS SOON AS POSSIBLE TO DISCUSS SURGERY PER NADIA  PT SEEN AT [facility] EM[facility]  PLEASE SEE NADIA 04/12/2021 TELECONSULT NOTE  CALLED PT AND CONFIRMED 04/15/2021 APT W/DKO

## 2021-04-14 NOTE — H&P (VIEW-ONLY)
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 Case request operating room: left 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     Case request operating room: left 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          Type of Visit: Consult       HPI: Patient presents for consultation with MRI brain w wo 4/12/21, per Teleconsult    In review - Patient presented to [address] [place] [address] on 4/12/21 with 1 month hx of vertigo which was worsening and taking meclizine without relief  Imaging:  CT head w/o, 4/12/21: 2 3cm partially calcified mass left CP angle with some mass effect  MRI brain w/wo, 4/12/21: same as CT head  Mass effect with compression of left lauren, no extension into cavernous sinus or meckel's cave  No edema appreciated on FLAIR images  Attending reviewed imaging with Neurosurgery team and we recommended an outpatient f/u in 1-2 weeks  patient complains of pain behind her left eye as well as headaches that begin in this region and then radiate to the entire head  These have become increasingly problematic for her  Occasionally her left eye becomes injected in is very red  She occasionally has diplopia but this quickly corrects  She has developed headaches with numbness and tingling bilaterally in her arms  Her significant other reports that she has increasing weakness of the right upper extremity    She is employed as a  the headache severity can interrupt her ability to perform her job  She also has developed some lightheadedness with balance difficulties that her periodically in nature  Is for each of these reasons that she went for a CT scan of the brain followed by an MRI of the brain  Review of Systems   Constitutional: Positive for activity change (poor grasp)  HENT: Negative  Eyes: Negative  Respiratory: Negative  Cardiovascular: Negative  Gastrointestinal: Negative  Endocrine: Negative  Genitourinary: Negative  Musculoskeletal: Positive for neck pain  Skin: Negative  Allergic/Immunologic: Negative  Neurological: Positive for weakness (mainly upper body), light-headedness, numbness (n/t in bilateral arms) and headaches  Hematological: Negative  Psychiatric/Behavioral: Negative  All other systems reviewed and are negative  I reviewed the ROS  Vitals:    /99 (BP Location: Right arm, Patient Position: Sitting, Cuff Size: Standard)   Pulse 65   Temp (!) 97 4 °F (36 3 °C) (Probe) Comment: Spouse: 98 2  Resp 16   Ht 5' 4" (1 626 m)   Wt 115 kg (253 lb)   BMI 43 43 kg/m²       MEDICAL HISTORY  History reviewed  No pertinent past medical history    Past Surgical History:   Procedure Laterality Date    HYSTERECTOMY       Social History     Tobacco Use    Smoking status: Current Every Day Smoker    Smokeless tobacco: Never Used   Substance Use Topics    Alcohol use: Yes     Frequency: 4 or more times a week    Drug use: Yes     Types: Marijuana        Current Outpatient Medications:     azelastine (OPTIVAR) 0 05 % ophthalmic solution, Apply 1 drop to eye as needed, Disp: , Rfl:     famotidine (PEPCID) 40 MG tablet, Take 40 mg by mouth daily, Disp: , Rfl:     ondansetron (ZOFRAN-ODT) 4 mg disintegrating tablet, Take 1 tablet (4 mg total) by mouth every 6 (six) hours as needed for nausea or vomiting for up to 20 doses, Disp: 20 tablet, Rfl: 0   oxyCODONE-acetaminophen (PERCOCET) 5-325 mg per tablet, Take 1 tablet by mouth every 6 (six) hours as needed for moderate pain or severe pain (headache/initial rx ) for up to 3 days Label no driving no etoh  Initial rx  Dx:Max Daily Amount: 4 tablets, Disp: 20 tablet, Rfl: 0    patient supplied medication, CBD gummies as needed, Disp: , Rfl:     sertraline (ZOLOFT) 50 mg tablet, TAKE 1 TABLET BY MOUTH EVERY DAY, Disp: , Rfl:     meclizine (ANTIVERT) 25 mg tablet, Take 1 tablet (25 mg total) by mouth 3 (three) times a day as needed for dizziness for up to 15 doses (Patient not taking: Reported on 4/15/2021), Disp: 15 tablet, Rfl: 0     No Known Allergies     The following portions of the patient's history were updated by MA and reviewed by MD: allergies, current medications, past family history, past medical history, past social history, past surgical history and problem list       Physical Exam  Vitals signs and nursing note reviewed  Constitutional:       General: She is not in acute distress  Appearance: Normal appearance  She is not ill-appearing, toxic-appearing or diaphoretic  HENT:      Head: Normocephalic  Nose: Nose normal       Mouth/Throat:      Mouth: Mucous membranes are dry  Eyes:      General: No scleral icterus  Right eye: No discharge  Left eye: No discharge  Extraocular Movements: Extraocular movements intact  Pupils: Pupils are equal, round, and reactive to light  Comments:   Neither eye is injected in the conjunctiva currently however she does report that this occurs from time to time  It is almost always that this is the left side and when the left side is involved sometimes are can be minor irritation of the right side also  Neck:      Musculoskeletal: Normal range of motion and neck supple  No neck rigidity  Cardiovascular:      Rate and Rhythm: Bradycardia present  Pulses: Normal pulses  Heart sounds: No murmur  No friction rub  No gallop  Comments: She has a bradycardia which is been known since she was a child  Pulmonary:      Effort: Pulmonary effort is normal  No respiratory distress  Breath sounds: Normal breath sounds  No stridor  No wheezing, rhonchi or rales  Chest:      Chest wall: No tenderness  Musculoskeletal: Normal range of motion  General: Tenderness (  There is tenderness to palpation over the left temporomandibular joint  She says that this began following a series of ECT treatments) present  No swelling, deformity or signs of injury  Right lower leg: No edema  Left lower leg: No edema  Lymphadenopathy:      Cervical: No cervical adenopathy  Skin:     General: Skin is warm and dry  Coloration: Skin is not jaundiced  Findings: No erythema or rash  Neurological:      General: No focal deficit present  Mental Status: She is alert and oriented to person, place, and time  Cranial Nerves: No cranial nerve deficit  Sensory: No sensory deficit  Motor: No weakness  Coordination: Coordination normal       Gait: Gait normal       Deep Tendon Reflexes: Reflexes normal    Psychiatric:         Mood and Affect: Mood normal          Behavior: Behavior normal          Thought Content: Thought content normal          Judgment: Judgment normal            RESULTS/DATA  I have personally reviewed pertinent films in PACS      MRI of the brain is carefully reviewed  This demonstrates a left cerebellopontine angle mass with deformation of the mesencephalon  There is some edema in the brainstem  I do not see any sign of cerebral vascular  Accidents  There does appear to be contrast enhancement tracking along the course of the 5th nerve into the cavernous sinus  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

## 2021-04-14 NOTE — PROGRESS NOTES
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 Case request operating room: left 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     Case request operating room: left 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          Type of Visit: Consult       HPI: Patient presents for consultation with MRI brain w wo 4/12/21, per Teleconsult    In review - Patient presented to [address] [place] [address] on 4/12/21 with 1 month hx of vertigo which was worsening and taking meclizine without relief  Imaging:  CT head w/o, 4/12/21: 2 3cm partially calcified mass left CP angle with some mass effect  MRI brain w/wo, 4/12/21: same as CT head  Mass effect with compression of left lauren, no extension into cavernous sinus or meckel's cave  No edema appreciated on FLAIR images  Attending reviewed imaging with Neurosurgery team and we recommended an outpatient f/u in 1-2 weeks  patient complains of pain behind her left eye as well as headaches that begin in this region and then radiate to the entire head  These have become increasingly problematic for her  Occasionally her left eye becomes injected in is very red  She occasionally has diplopia but this quickly corrects  She has developed headaches with numbness and tingling bilaterally in her arms  Her significant other reports that she has increasing weakness of the right upper extremity    She is employed as a  the headache severity can interrupt her ability to perform her job  She also has developed some lightheadedness with balance difficulties that her periodically in nature  Is for each of these reasons that she went for a CT scan of the brain followed by an MRI of the brain  Review of Systems   Constitutional: Positive for activity change (poor grasp)  HENT: Negative  Eyes: Negative  Respiratory: Negative  Cardiovascular: Negative  Gastrointestinal: Negative  Endocrine: Negative  Genitourinary: Negative  Musculoskeletal: Positive for neck pain  Skin: Negative  Allergic/Immunologic: Negative  Neurological: Positive for weakness (mainly upper body), light-headedness, numbness (n/t in bilateral arms) and headaches  Hematological: Negative  Psychiatric/Behavioral: Negative  All other systems reviewed and are negative  I reviewed the ROS  Vitals:    /99 (BP Location: Right arm, Patient Position: Sitting, Cuff Size: Standard)   Pulse 65   Temp (!) 97 4 °F (36 3 °C) (Probe) Comment: Spouse: 98 2  Resp 16   Ht 5' 4" (1 626 m)   Wt 115 kg (253 lb)   BMI 43 43 kg/m²       MEDICAL HISTORY  History reviewed  No pertinent past medical history    Past Surgical History:   Procedure Laterality Date    HYSTERECTOMY       Social History     Tobacco Use    Smoking status: Current Every Day Smoker    Smokeless tobacco: Never Used   Substance Use Topics    Alcohol use: Yes     Frequency: 4 or more times a week    Drug use: Yes     Types: Marijuana        Current Outpatient Medications:     azelastine (OPTIVAR) 0 05 % ophthalmic solution, Apply 1 drop to eye as needed, Disp: , Rfl:     famotidine (PEPCID) 40 MG tablet, Take 40 mg by mouth daily, Disp: , Rfl:     ondansetron (ZOFRAN-ODT) 4 mg disintegrating tablet, Take 1 tablet (4 mg total) by mouth every 6 (six) hours as needed for nausea or vomiting for up to 20 doses, Disp: 20 tablet, Rfl: 0   oxyCODONE-acetaminophen (PERCOCET) 5-325 mg per tablet, Take 1 tablet by mouth every 6 (six) hours as needed for moderate pain or severe pain (headache/initial rx ) for up to 3 days Label no driving no etoh  Initial rx  Dx:Max Daily Amount: 4 tablets, Disp: 20 tablet, Rfl: 0    patient supplied medication, CBD gummies as needed, Disp: , Rfl:     sertraline (ZOLOFT) 50 mg tablet, TAKE 1 TABLET BY MOUTH EVERY DAY, Disp: , Rfl:     meclizine (ANTIVERT) 25 mg tablet, Take 1 tablet (25 mg total) by mouth 3 (three) times a day as needed for dizziness for up to 15 doses (Patient not taking: Reported on 4/15/2021), Disp: 15 tablet, Rfl: 0     No Known Allergies     The following portions of the patient's history were updated by MA and reviewed by MD: allergies, current medications, past family history, past medical history, past social history, past surgical history and problem list       Physical Exam  Vitals signs and nursing note reviewed  Constitutional:       General: She is not in acute distress  Appearance: Normal appearance  She is not ill-appearing, toxic-appearing or diaphoretic  HENT:      Head: Normocephalic  Nose: Nose normal       Mouth/Throat:      Mouth: Mucous membranes are dry  Eyes:      General: No scleral icterus  Right eye: No discharge  Left eye: No discharge  Extraocular Movements: Extraocular movements intact  Pupils: Pupils are equal, round, and reactive to light  Comments:   Neither eye is injected in the conjunctiva currently however she does report that this occurs from time to time  It is almost always that this is the left side and when the left side is involved sometimes are can be minor irritation of the right side also  Neck:      Musculoskeletal: Normal range of motion and neck supple  No neck rigidity  Cardiovascular:      Rate and Rhythm: Bradycardia present  Pulses: Normal pulses  Heart sounds: No murmur  No friction rub  No gallop  Comments: She has a bradycardia which is been known since she was a child  Pulmonary:      Effort: Pulmonary effort is normal  No respiratory distress  Breath sounds: Normal breath sounds  No stridor  No wheezing, rhonchi or rales  Chest:      Chest wall: No tenderness  Musculoskeletal: Normal range of motion  General: Tenderness (  There is tenderness to palpation over the left temporomandibular joint  She says that this began following a series of ECT treatments) present  No swelling, deformity or signs of injury  Right lower leg: No edema  Left lower leg: No edema  Lymphadenopathy:      Cervical: No cervical adenopathy  Skin:     General: Skin is warm and dry  Coloration: Skin is not jaundiced  Findings: No erythema or rash  Neurological:      General: No focal deficit present  Mental Status: She is alert and oriented to person, place, and time  Cranial Nerves: No cranial nerve deficit  Sensory: No sensory deficit  Motor: No weakness  Coordination: Coordination normal       Gait: Gait normal       Deep Tendon Reflexes: Reflexes normal    Psychiatric:         Mood and Affect: Mood normal          Behavior: Behavior normal          Thought Content: Thought content normal          Judgment: Judgment normal            RESULTS/DATA  I have personally reviewed pertinent films in PACS      MRI of the brain is carefully reviewed  This demonstrates a left cerebellopontine angle mass with deformation of the mesencephalon  There is some edema in the brainstem  I do not see any sign of cerebral vascular  Accidents  There does appear to be contrast enhancement tracking along the course of the 5th nerve into the cavernous sinus  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

## 2021-04-15 ENCOUNTER — CONSULT (OUTPATIENT)
Dept: NEUROSURGERY | Facility: CLINIC | Age: 41
End: 2021-04-15
Payer: MEDICARE

## 2021-04-15 VITALS
HEIGHT: 64 IN | WEIGHT: 253 LBS | TEMPERATURE: 97.4 F | DIASTOLIC BLOOD PRESSURE: 99 MMHG | RESPIRATION RATE: 16 BRPM | SYSTOLIC BLOOD PRESSURE: 154 MMHG | BODY MASS INDEX: 43.19 KG/M2 | HEART RATE: 65 BPM

## 2021-04-15 DIAGNOSIS — Z11.59 SCREENING FOR VIRAL DISEASE: ICD-10-CM

## 2021-04-15 DIAGNOSIS — D33.3 CEREBELLOPONTINE ANGLE TUMOR (HCC): Primary | ICD-10-CM

## 2021-04-15 PROCEDURE — 99204 OFFICE O/P NEW MOD 45 MIN: CPT | Performed by: NEUROLOGICAL SURGERY

## 2021-04-15 RX ORDER — FAMOTIDINE 40 MG/1
40 TABLET, FILM COATED ORAL DAILY
COMMUNITY
Start: 2020-08-21 | End: 2021-09-02

## 2021-04-15 RX ORDER — CEFAZOLIN SODIUM 2 G/50ML
2000 SOLUTION INTRAVENOUS ONCE
Status: CANCELLED | OUTPATIENT
Start: 2021-04-15 | End: 2021-04-15

## 2021-04-15 RX ORDER — AZELASTINE HYDROCHLORIDE 0.5 MG/ML
1 SOLUTION/ DROPS OPHTHALMIC AS NEEDED
COMMUNITY
Start: 2020-09-10 | End: 2021-09-10

## 2021-04-15 RX ORDER — CHLORHEXIDINE GLUCONATE 0.12 MG/ML
15 RINSE ORAL ONCE
Status: CANCELLED | OUTPATIENT
Start: 2021-04-15 | End: 2021-04-15

## 2021-05-05 ENCOUNTER — ANESTHESIA EVENT (OUTPATIENT)
Dept: PERIOP | Facility: HOSPITAL | Age: 41
DRG: 025 | End: 2021-05-05
Payer: MEDICARE

## 2021-05-05 RX ORDER — ACETAMINOPHEN 500 MG
1000 TABLET ORAL EVERY 6 HOURS PRN
COMMUNITY

## 2021-05-05 RX ORDER — MULTIVITAMIN
1 TABLET ORAL DAILY
Status: ON HOLD | COMMUNITY
End: 2021-05-10

## 2021-05-05 NOTE — PRE-PROCEDURE INSTRUCTIONS
Pre-Surgery Instructions:   Medication Instructions    acetaminophen (TYLENOL) 500 mg tablet Instructed patient per Anesthesia Guidelines   azelastine (OPTIVAR) 0 05 % ophthalmic solution Instructed patient per Anesthesia Guidelines   Biotin w/ Vitamins C & E (HAIR/SKIN/NAILS PO) Instructed patient per Anesthesia Guidelines   Coenzyme Q10 (COQ-10 PO) Instructed patient per Anesthesia Guidelines   COVID-19 mRNA Virus Vaccine (MODERNA COVID-19 VACCINE IM) Instructed patient per Anesthesia Guidelines   famotidine (PEPCID) 40 MG tablet Instructed patient per Anesthesia Guidelines   Multiple Vitamin (multivitamin) tablet Instructed patient per Anesthesia Guidelines   ondansetron (ZOFRAN-ODT) 4 mg disintegrating tablet Instructed patient per Anesthesia Guidelines   patient supplied medication Instructed patient per Anesthesia Guidelines   sertraline (ZOLOFT) 50 mg tablet Instructed patient per Anesthesia Guidelines  Patient  instructed *to take*pepcid and zoloft with a sip of water the morning of surgery and if needed tylenol  Patient / instructed on use of chlorhexidine soap per hospital protocol    Patient instructed to stop all ASA, NSAIDS, vitamins and herbal supplements one week prior to surgery or per  Dr Jyoti Cody

## 2021-05-06 LAB — HBA1C MFR BLD HPLC: 5.1 %

## 2021-05-07 ENCOUNTER — DOCUMENTATION (OUTPATIENT)
Dept: NEUROSURGERY | Facility: CLINIC | Age: 41
End: 2021-05-07

## 2021-05-10 ENCOUNTER — ANESTHESIA (OUTPATIENT)
Dept: PERIOP | Facility: HOSPITAL | Age: 41
DRG: 025 | End: 2021-05-10
Payer: MEDICARE

## 2021-05-10 ENCOUNTER — HOSPITAL ENCOUNTER (INPATIENT)
Facility: HOSPITAL | Age: 41
LOS: 6 days | Discharge: HOME | DRG: 025 | End: 2021-05-16
Attending: NEUROLOGICAL SURGERY | Admitting: NEUROLOGICAL SURGERY
Payer: MEDICARE

## 2021-05-10 DIAGNOSIS — D33.3 CEREBELLOPONTINE ANGLE TUMOR (HCC): Primary | ICD-10-CM

## 2021-05-10 DIAGNOSIS — D32.9 MENINGIOMA (HCC): ICD-10-CM

## 2021-05-10 DIAGNOSIS — Z98.890 POST-OPERATIVE STATE: ICD-10-CM

## 2021-05-10 PROBLEM — E66.9 OBESITY: Status: ACTIVE | Noted: 2021-05-10

## 2021-05-10 PROBLEM — K21.9 GASTROESOPHAGEAL REFLUX DISEASE: Status: ACTIVE | Noted: 2021-05-10

## 2021-05-10 PROBLEM — F32.A DEPRESSION: Status: ACTIVE | Noted: 2021-05-10

## 2021-05-10 LAB
ABO GROUP BLD: NORMAL
ABO GROUP BLD: NORMAL
ALBUMIN SERPL BCP-MCNC: 3.7 G/DL (ref 3.5–5)
ALP SERPL-CCNC: 60 U/L (ref 46–116)
ALT SERPL W P-5'-P-CCNC: 257 U/L (ref 12–78)
ANION GAP SERPL CALCULATED.3IONS-SCNC: 12 MMOL/L (ref 4–13)
ANION GAP SERPL CALCULATED.3IONS-SCNC: 15 MMOL/L (ref 4–13)
ANISOCYTOSIS BLD QL SMEAR: PRESENT
AST SERPL W P-5'-P-CCNC: 206 U/L (ref 5–45)
BASE EXCESS BLDA CALC-SCNC: -10 MMOL/L (ref -2–3)
BASE EXCESS BLDA CALC-SCNC: -10 MMOL/L (ref -2–3)
BASE EXCESS BLDA CALC-SCNC: -4 MMOL/L (ref -2–3)
BASE EXCESS BLDA CALC-SCNC: -7 MMOL/L (ref -2–3)
BASE EXCESS BLDA CALC-SCNC: -7.1 MMOL/L
BASE EXCESS BLDA CALC-SCNC: -9 MMOL/L (ref -2–3)
BASE EXCESS BLDA CALC-SCNC: -9 MMOL/L (ref -2–3)
BASOPHILS # BLD AUTO: 0.02 THOUSANDS/ΜL (ref 0–0.1)
BASOPHILS # BLD MANUAL: 0 THOUSAND/UL (ref 0–0.1)
BASOPHILS NFR BLD AUTO: 0 % (ref 0–1)
BASOPHILS NFR MAR MANUAL: 0 % (ref 0–1)
BILIRUB SERPL-MCNC: 0.29 MG/DL (ref 0.2–1)
BLD GP AB SCN SERPL QL: NEGATIVE
BODY TEMPERATURE: 98.1 DEGREES FEHRENHEIT
BUN SERPL-MCNC: 4 MG/DL (ref 5–25)
BUN SERPL-MCNC: 5 MG/DL (ref 5–25)
CA-I BLD-SCNC: 1.05 MMOL/L (ref 1.12–1.32)
CA-I BLD-SCNC: 1.06 MMOL/L (ref 1.12–1.32)
CA-I BLD-SCNC: 1.11 MMOL/L (ref 1.12–1.32)
CA-I BLD-SCNC: 1.22 MMOL/L (ref 1.12–1.32)
CA-I BLD-SCNC: 1.27 MMOL/L (ref 1.12–1.32)
CA-I BLD-SCNC: 1.27 MMOL/L (ref 1.12–1.32)
CA-I BLD-SCNC: 1.29 MMOL/L (ref 1.12–1.32)
CALCIUM SERPL-MCNC: 8.7 MG/DL (ref 8.3–10.1)
CALCIUM SERPL-MCNC: 9 MG/DL (ref 8.3–10.1)
CHLORIDE SERPL-SCNC: 113 MMOL/L (ref 100–108)
CHLORIDE SERPL-SCNC: 113 MMOL/L (ref 100–108)
CK MB SERPL-MCNC: 1.1 % (ref 0–2.5)
CK MB SERPL-MCNC: 22.2 NG/ML (ref 0–5)
CK SERPL-CCNC: 1963 U/L (ref 26–192)
CO2 SERPL-SCNC: 15 MMOL/L (ref 21–32)
CO2 SERPL-SCNC: 17 MMOL/L (ref 21–32)
CREAT SERPL-MCNC: 0.94 MG/DL (ref 0.6–1.3)
CREAT SERPL-MCNC: 0.98 MG/DL (ref 0.6–1.3)
DACRYOCYTES BLD QL SMEAR: PRESENT
EOSINOPHIL # BLD AUTO: 0 THOUSAND/ΜL (ref 0–0.61)
EOSINOPHIL # BLD MANUAL: 0 THOUSAND/UL (ref 0–0.4)
EOSINOPHIL NFR BLD AUTO: 0 % (ref 0–6)
EOSINOPHIL NFR BLD MANUAL: 0 % (ref 0–6)
ERYTHROCYTE [DISTWIDTH] IN BLOOD BY AUTOMATED COUNT: 12.5 % (ref 11.6–15.1)
ERYTHROCYTE [DISTWIDTH] IN BLOOD BY AUTOMATED COUNT: 12.6 % (ref 11.6–15.1)
GFR SERPL CREATININE-BSD FRML MDRD: 71 ML/MIN/1.73SQ M
GFR SERPL CREATININE-BSD FRML MDRD: 75 ML/MIN/1.73SQ M
GLUCOSE SERPL-MCNC: 125 MG/DL (ref 65–140)
GLUCOSE SERPL-MCNC: 139 MG/DL (ref 65–140)
GLUCOSE SERPL-MCNC: 144 MG/DL (ref 65–140)
GLUCOSE SERPL-MCNC: 151 MG/DL (ref 65–140)
GLUCOSE SERPL-MCNC: 153 MG/DL (ref 65–140)
GLUCOSE SERPL-MCNC: 156 MG/DL (ref 65–140)
GLUCOSE SERPL-MCNC: 167 MG/DL (ref 65–140)
GLUCOSE SERPL-MCNC: 175 MG/DL (ref 65–140)
HCO3 BLDA-SCNC: 15.7 MMOL/L (ref 22–28)
HCO3 BLDA-SCNC: 16.4 MMOL/L (ref 24–30)
HCO3 BLDA-SCNC: 17.4 MMOL/L (ref 22–28)
HCO3 BLDA-SCNC: 17.4 MMOL/L (ref 22–28)
HCO3 BLDA-SCNC: 18.1 MMOL/L (ref 22–28)
HCO3 BLDA-SCNC: 19.2 MMOL/L (ref 22–28)
HCO3 BLDA-SCNC: 21.8 MMOL/L (ref 22–28)
HCT VFR BLD AUTO: 37.4 % (ref 34.8–46.1)
HCT VFR BLD AUTO: 39.6 % (ref 34.8–46.1)
HCT VFR BLD CALC: 33 % (ref 34.8–46.1)
HCT VFR BLD CALC: 35 % (ref 34.8–46.1)
HCT VFR BLD CALC: 36 % (ref 34.8–46.1)
HCT VFR BLD CALC: 36 % (ref 34.8–46.1)
HCT VFR BLD CALC: 40 % (ref 34.8–46.1)
HCT VFR BLD CALC: 42 % (ref 34.8–46.1)
HGB BLD-MCNC: 12.8 G/DL (ref 11.5–15.4)
HGB BLD-MCNC: 13.1 G/DL (ref 11.5–15.4)
HGB BLDA-MCNC: 11.2 G/DL (ref 11.5–15.4)
HGB BLDA-MCNC: 11.9 G/DL (ref 11.5–15.4)
HGB BLDA-MCNC: 12.2 G/DL (ref 11.5–15.4)
HGB BLDA-MCNC: 12.2 G/DL (ref 11.5–15.4)
HGB BLDA-MCNC: 13.6 G/DL (ref 11.5–15.4)
HGB BLDA-MCNC: 14.3 G/DL (ref 11.5–15.4)
IMM GRANULOCYTES # BLD AUTO: 0.06 THOUSAND/UL (ref 0–0.2)
IMM GRANULOCYTES NFR BLD AUTO: 1 % (ref 0–2)
INR PPP: 1.05 (ref 0.84–1.19)
LACTATE SERPL-SCNC: 3.1 MMOL/L (ref 0.5–2)
LACTATE SERPL-SCNC: 6.3 MMOL/L (ref 0.5–2)
LACTATE SERPL-SCNC: 6.7 MMOL/L (ref 0.5–2)
LYMPHOCYTES # BLD AUTO: 0.14 THOUSAND/UL (ref 0.6–4.47)
LYMPHOCYTES # BLD AUTO: 0.57 THOUSANDS/ΜL (ref 0.6–4.47)
LYMPHOCYTES # BLD AUTO: 2 % (ref 14–44)
LYMPHOCYTES NFR BLD AUTO: 5 % (ref 14–44)
MAGNESIUM SERPL-MCNC: 1.7 MG/DL (ref 1.6–2.6)
MAGNESIUM SERPL-MCNC: 1.9 MG/DL (ref 1.6–2.6)
MCH RBC QN AUTO: 34 PG (ref 26.8–34.3)
MCH RBC QN AUTO: 35.9 PG (ref 26.8–34.3)
MCHC RBC AUTO-ENTMCNC: 33.1 G/DL (ref 31.4–37.4)
MCHC RBC AUTO-ENTMCNC: 34.2 G/DL (ref 31.4–37.4)
MCV RBC AUTO: 103 FL (ref 82–98)
MCV RBC AUTO: 105 FL (ref 82–98)
MONOCYTES # BLD AUTO: 0.07 THOUSAND/UL (ref 0–1.22)
MONOCYTES # BLD AUTO: 0.4 THOUSAND/ΜL (ref 0.17–1.22)
MONOCYTES NFR BLD AUTO: 4 % (ref 4–12)
MONOCYTES NFR BLD: 1 % (ref 4–12)
NASAL CANNULA: 2
NEUTROPHILS # BLD AUTO: 10.4 THOUSANDS/ΜL (ref 1.85–7.62)
NEUTROPHILS # BLD MANUAL: 6.97 THOUSAND/UL (ref 1.85–7.62)
NEUTS SEG NFR BLD AUTO: 90 % (ref 43–75)
NEUTS SEG NFR BLD AUTO: 97 % (ref 43–75)
NRBC BLD AUTO-RTO: 0 /100 WBCS
NRBC BLD AUTO-RTO: 0 /100 WBCS
O2 CT BLDA-SCNC: 18 ML/DL (ref 16–23)
OXYHGB MFR BLDA: 95.5 % (ref 94–97)
PCO2 BLD: 17 MMOL/L (ref 21–32)
PCO2 BLD: 17 MMOL/L (ref 21–32)
PCO2 BLD: 19 MMOL/L (ref 21–32)
PCO2 BLD: 20 MMOL/L (ref 21–32)
PCO2 BLD: 20 MMOL/L (ref 21–32)
PCO2 BLD: 23 MMOL/L (ref 21–32)
PCO2 BLD: 32.9 MM HG (ref 36–44)
PCO2 BLD: 33.8 MM HG (ref 42–50)
PCO2 BLD: 38.2 MM HG (ref 36–44)
PCO2 BLD: 40.2 MM HG (ref 36–44)
PCO2 BLD: 43.4 MM HG (ref 36–44)
PCO2 BLD: 46.9 MM HG (ref 36–44)
PCO2 BLDA: 32.4 MM HG (ref 36–44)
PCO2 TEMP ADJ BLDA: 32 MM HG (ref 36–44)
PH BLD: 7.2 [PH] (ref 7.35–7.45)
PH BLD: 7.27 [PH] (ref 7.35–7.45)
PH BLD: 7.29 [PH] (ref 7.35–7.45)
PH BLD: 7.29 [PH] (ref 7.35–7.45)
PH BLD: 7.29 [PH] (ref 7.3–7.4)
PH BLD: 7.31 [PH] (ref 7.35–7.45)
PH BLD: 7.35 [PH] (ref 7.35–7.45)
PH BLDA: 7.35 [PH] (ref 7.35–7.45)
PHOSPHATE SERPL-MCNC: 2.7 MG/DL (ref 2.7–4.5)
PLATELET # BLD AUTO: 179 THOUSANDS/UL (ref 149–390)
PLATELET # BLD AUTO: 188 THOUSANDS/UL (ref 149–390)
PLATELET BLD QL SMEAR: ADEQUATE
PMV BLD AUTO: 10.1 FL (ref 8.9–12.7)
PMV BLD AUTO: 9.8 FL (ref 8.9–12.7)
PO2 BLD: 101 MM HG (ref 75–129)
PO2 BLD: 119 MM HG (ref 75–129)
PO2 BLD: 131 MM HG (ref 75–129)
PO2 BLD: 234 MM HG (ref 75–129)
PO2 BLD: 79.5 MM HG (ref 75–129)
PO2 BLD: 93 MM HG (ref 75–129)
PO2 BLD: 96 MM HG (ref 35–45)
PO2 BLDA: 81.1 MM HG (ref 75–129)
POTASSIUM BLD-SCNC: 3.4 MMOL/L (ref 3.5–5.3)
POTASSIUM BLD-SCNC: 3.5 MMOL/L (ref 3.5–5.3)
POTASSIUM BLD-SCNC: 3.5 MMOL/L (ref 3.5–5.3)
POTASSIUM BLD-SCNC: 4.2 MMOL/L (ref 3.5–5.3)
POTASSIUM BLD-SCNC: 4.3 MMOL/L (ref 3.5–5.3)
POTASSIUM BLD-SCNC: 4.5 MMOL/L (ref 3.5–5.3)
POTASSIUM SERPL-SCNC: 4.3 MMOL/L (ref 3.5–5.3)
POTASSIUM SERPL-SCNC: 4.5 MMOL/L (ref 3.5–5.3)
PROT SERPL-MCNC: 7.2 G/DL (ref 6.4–8.2)
PROTHROMBIN TIME: 13.7 SECONDS (ref 11.6–14.5)
RBC # BLD AUTO: 3.57 MILLION/UL (ref 3.81–5.12)
RBC # BLD AUTO: 3.85 MILLION/UL (ref 3.81–5.12)
RBC MORPH BLD: PRESENT
RH BLD: POSITIVE
RH BLD: POSITIVE
SAO2 % BLD FROM PO2: 100 % (ref 60–85)
SAO2 % BLD FROM PO2: 96 % (ref 60–85)
SAO2 % BLD FROM PO2: 97 % (ref 60–85)
SAO2 % BLD FROM PO2: 97 % (ref 60–85)
SAO2 % BLD FROM PO2: 98 % (ref 60–85)
SAO2 % BLD FROM PO2: 99 % (ref 60–85)
SARS-COV-2 RNA RESP QL NAA+PROBE: NEGATIVE
SODIUM BLD-SCNC: 140 MMOL/L (ref 136–145)
SODIUM BLD-SCNC: 142 MMOL/L (ref 136–145)
SODIUM BLD-SCNC: 143 MMOL/L (ref 136–145)
SODIUM BLD-SCNC: 144 MMOL/L (ref 136–145)
SODIUM SERPL-SCNC: 142 MMOL/L (ref 136–145)
SODIUM SERPL-SCNC: 143 MMOL/L (ref 136–145)
SPECIMEN EXPIRATION DATE: NORMAL
SPECIMEN SOURCE: ABNORMAL
WBC # BLD AUTO: 11.45 THOUSAND/UL (ref 4.31–10.16)
WBC # BLD AUTO: 7.19 THOUSAND/UL (ref 4.31–10.16)

## 2021-05-10 PROCEDURE — 84100 ASSAY OF PHOSPHORUS: CPT | Performed by: PHYSICIAN ASSISTANT

## 2021-05-10 PROCEDURE — 61781 SCAN PROC CRANIAL INTRA: CPT | Performed by: NEUROLOGICAL SURGERY

## 2021-05-10 PROCEDURE — 88333 PATH CONSLTJ SURG CYTO XM 1: CPT | Performed by: PATHOLOGY

## 2021-05-10 PROCEDURE — 82330 ASSAY OF CALCIUM: CPT

## 2021-05-10 PROCEDURE — 86850 RBC ANTIBODY SCREEN: CPT | Performed by: NEUROLOGICAL SURGERY

## 2021-05-10 PROCEDURE — 83735 ASSAY OF MAGNESIUM: CPT | Performed by: PHYSICIAN ASSISTANT

## 2021-05-10 PROCEDURE — 85027 COMPLETE CBC AUTOMATED: CPT | Performed by: NURSE ANESTHETIST, CERTIFIED REGISTERED

## 2021-05-10 PROCEDURE — C1713 ANCHOR/SCREW BN/BN,TIS/BN: HCPCS | Performed by: NEUROLOGICAL SURGERY

## 2021-05-10 PROCEDURE — 88331 PATH CONSLTJ SURG 1 BLK 1SPC: CPT | Performed by: PATHOLOGY

## 2021-05-10 PROCEDURE — 83735 ASSAY OF MAGNESIUM: CPT | Performed by: NURSE ANESTHETIST, CERTIFIED REGISTERED

## 2021-05-10 PROCEDURE — 86920 COMPATIBILITY TEST SPIN: CPT

## 2021-05-10 PROCEDURE — 86900 BLOOD TYPING SEROLOGIC ABO: CPT | Performed by: NEUROLOGICAL SURGERY

## 2021-05-10 PROCEDURE — 86901 BLOOD TYPING SEROLOGIC RH(D): CPT | Performed by: NEUROLOGICAL SURGERY

## 2021-05-10 PROCEDURE — 88331 PATH CONSLTJ SURG 1 BLK 1SPC: CPT | Performed by: NEUROLOGICAL SURGERY

## 2021-05-10 PROCEDURE — 85610 PROTHROMBIN TIME: CPT | Performed by: PHYSICIAN ASSISTANT

## 2021-05-10 PROCEDURE — 99291 CRITICAL CARE FIRST HOUR: CPT | Performed by: STUDENT IN AN ORGANIZED HEALTH CARE EDUCATION/TRAINING PROGRAM

## 2021-05-10 PROCEDURE — 84295 ASSAY OF SERUM SODIUM: CPT

## 2021-05-10 PROCEDURE — 61530 REMOVAL OF BRAIN LESION: CPT | Performed by: NEUROLOGICAL SURGERY

## 2021-05-10 PROCEDURE — 82947 ASSAY GLUCOSE BLOOD QUANT: CPT

## 2021-05-10 PROCEDURE — 88307 TISSUE EXAM BY PATHOLOGIST: CPT | Performed by: PATHOLOGY

## 2021-05-10 PROCEDURE — 80053 COMPREHEN METABOLIC PANEL: CPT | Performed by: PHYSICIAN ASSISTANT

## 2021-05-10 PROCEDURE — 82803 BLOOD GASES ANY COMBINATION: CPT

## 2021-05-10 PROCEDURE — 85025 COMPLETE CBC W/AUTO DIFF WBC: CPT | Performed by: PHYSICIAN ASSISTANT

## 2021-05-10 PROCEDURE — 84132 ASSAY OF SERUM POTASSIUM: CPT

## 2021-05-10 PROCEDURE — 69990 MICROSURGERY ADD-ON: CPT | Performed by: NEUROLOGICAL SURGERY

## 2021-05-10 PROCEDURE — 82805 BLOOD GASES W/O2 SATURATION: CPT | Performed by: PHYSICIAN ASSISTANT

## 2021-05-10 PROCEDURE — U0005 INFEC AGEN DETEC AMPLI PROBE: HCPCS | Performed by: NEUROLOGICAL SURGERY

## 2021-05-10 PROCEDURE — 83605 ASSAY OF LACTIC ACID: CPT | Performed by: NURSE ANESTHETIST, CERTIFIED REGISTERED

## 2021-05-10 PROCEDURE — 88342 IMHCHEM/IMCYTCHM 1ST ANTB: CPT

## 2021-05-10 PROCEDURE — 83605 ASSAY OF LACTIC ACID: CPT | Performed by: PHYSICIAN ASSISTANT

## 2021-05-10 PROCEDURE — 82553 CREATINE MB FRACTION: CPT | Performed by: PHYSICIAN ASSISTANT

## 2021-05-10 PROCEDURE — 85007 BL SMEAR W/DIFF WBC COUNT: CPT | Performed by: NURSE ANESTHETIST, CERTIFIED REGISTERED

## 2021-05-10 PROCEDURE — 82550 ASSAY OF CK (CPK): CPT | Performed by: PHYSICIAN ASSISTANT

## 2021-05-10 PROCEDURE — U0003 INFECTIOUS AGENT DETECTION BY NUCLEIC ACID (DNA OR RNA); SEVERE ACUTE RESPIRATORY SYNDROME CORONAVIRUS 2 (SARS-COV-2) (CORONAVIRUS DISEASE [COVID-19]), AMPLIFIED PROBE TECHNIQUE, MAKING USE OF HIGH THROUGHPUT TECHNOLOGIES AS DESCRIBED BY CMS-2020-01-R: HCPCS | Performed by: NEUROLOGICAL SURGERY

## 2021-05-10 PROCEDURE — 85014 HEMATOCRIT: CPT

## 2021-05-10 PROCEDURE — 82330 ASSAY OF CALCIUM: CPT | Performed by: PHYSICIAN ASSISTANT

## 2021-05-10 PROCEDURE — 00BC0ZZ EXCISION OF CEREBELLUM, OPEN APPROACH: ICD-10-PCS | Performed by: NEUROLOGICAL SURGERY

## 2021-05-10 PROCEDURE — 80048 BASIC METABOLIC PNL TOTAL CA: CPT | Performed by: NURSE ANESTHETIST, CERTIFIED REGISTERED

## 2021-05-10 DEVICE — IMPLANTABLE DEVICE
Type: IMPLANTABLE DEVICE | Status: FUNCTIONAL
Brand: THINFLAP SYSTEM

## 2021-05-10 DEVICE — IMPLANTABLE DEVICE
Type: IMPLANTABLE DEVICE | Status: FUNCTIONAL
Brand: THINFLAP

## 2021-05-10 RX ORDER — CEFAZOLIN SODIUM 1 G/50ML
1000 SOLUTION INTRAVENOUS EVERY 8 HOURS
Status: COMPLETED | OUTPATIENT
Start: 2021-05-11 | End: 2021-05-11

## 2021-05-10 RX ORDER — ALBUTEROL SULFATE 90 UG/1
AEROSOL, METERED RESPIRATORY (INHALATION) AS NEEDED
Status: DISCONTINUED | OUTPATIENT
Start: 2021-05-10 | End: 2021-05-10

## 2021-05-10 RX ORDER — DEXAMETHASONE SODIUM PHOSPHATE 4 MG/ML
2 INJECTION, SOLUTION INTRA-ARTICULAR; INTRALESIONAL; INTRAMUSCULAR; INTRAVENOUS; SOFT TISSUE EVERY 24 HOURS
Status: DISCONTINUED | OUTPATIENT
Start: 2021-05-25 | End: 2021-05-16 | Stop reason: HOSPADM

## 2021-05-10 RX ORDER — ACETAMINOPHEN 325 MG/1
975 TABLET ORAL EVERY 8 HOURS SCHEDULED
Status: DISCONTINUED | OUTPATIENT
Start: 2021-05-10 | End: 2021-05-11

## 2021-05-10 RX ORDER — PROPOFOL 10 MG/ML
INJECTION, EMULSION INTRAVENOUS CONTINUOUS PRN
Status: DISCONTINUED | OUTPATIENT
Start: 2021-05-10 | End: 2021-05-10

## 2021-05-10 RX ORDER — SODIUM CHLORIDE 9 MG/ML
125 INJECTION, SOLUTION INTRAVENOUS CONTINUOUS
Status: DISCONTINUED | OUTPATIENT
Start: 2021-05-10 | End: 2021-05-10

## 2021-05-10 RX ORDER — ALBUMIN, HUMAN INJ 5% 5 %
SOLUTION INTRAVENOUS CONTINUOUS PRN
Status: DISCONTINUED | OUTPATIENT
Start: 2021-05-10 | End: 2021-05-10

## 2021-05-10 RX ORDER — CEFAZOLIN SODIUM 2 G/50ML
2000 SOLUTION INTRAVENOUS ONCE
Status: DISCONTINUED | OUTPATIENT
Start: 2021-05-10 | End: 2021-05-10 | Stop reason: HOSPADM

## 2021-05-10 RX ORDER — MAGNESIUM HYDROXIDE 1200 MG/15ML
LIQUID ORAL AS NEEDED
Status: DISCONTINUED | OUTPATIENT
Start: 2021-05-10 | End: 2021-05-10 | Stop reason: HOSPADM

## 2021-05-10 RX ORDER — OXYCODONE HYDROCHLORIDE 5 MG/1
5 TABLET ORAL EVERY 4 HOURS PRN
Status: DISCONTINUED | OUTPATIENT
Start: 2021-05-10 | End: 2021-05-16 | Stop reason: HOSPADM

## 2021-05-10 RX ORDER — SODIUM CHLORIDE 9 MG/ML
INJECTION, SOLUTION INTRAVENOUS CONTINUOUS PRN
Status: DISCONTINUED | OUTPATIENT
Start: 2021-05-10 | End: 2021-05-10

## 2021-05-10 RX ORDER — DEXAMETHASONE SODIUM PHOSPHATE 10 MG/ML
INJECTION, SOLUTION INTRAMUSCULAR; INTRAVENOUS AS NEEDED
Status: DISCONTINUED | OUTPATIENT
Start: 2021-05-10 | End: 2021-05-10

## 2021-05-10 RX ORDER — SERTRALINE HYDROCHLORIDE 100 MG/1
100 TABLET, FILM COATED ORAL DAILY
Status: DISCONTINUED | OUTPATIENT
Start: 2021-05-11 | End: 2021-05-16 | Stop reason: HOSPADM

## 2021-05-10 RX ORDER — ONDANSETRON 2 MG/ML
4 INJECTION INTRAMUSCULAR; INTRAVENOUS EVERY 6 HOURS PRN
Status: DISCONTINUED | OUTPATIENT
Start: 2021-05-10 | End: 2021-05-16 | Stop reason: HOSPADM

## 2021-05-10 RX ORDER — SODIUM CHLORIDE, SODIUM LACTATE, POTASSIUM CHLORIDE, CALCIUM CHLORIDE 600; 310; 30; 20 MG/100ML; MG/100ML; MG/100ML; MG/100ML
INJECTION, SOLUTION INTRAVENOUS CONTINUOUS PRN
Status: DISCONTINUED | OUTPATIENT
Start: 2021-05-10 | End: 2021-05-10

## 2021-05-10 RX ORDER — DEXAMETHASONE SODIUM PHOSPHATE 10 MG/ML
10 INJECTION, SOLUTION INTRAMUSCULAR; INTRAVENOUS ONCE
Status: COMPLETED | OUTPATIENT
Start: 2021-05-10 | End: 2021-05-10

## 2021-05-10 RX ORDER — HYDROMORPHONE HCL/PF 1 MG/ML
0.5 SYRINGE (ML) INJECTION
Status: DISCONTINUED | OUTPATIENT
Start: 2021-05-10 | End: 2021-05-10 | Stop reason: HOSPADM

## 2021-05-10 RX ORDER — FAMOTIDINE 20 MG/1
40 TABLET, FILM COATED ORAL DAILY
Status: DISCONTINUED | OUTPATIENT
Start: 2021-05-11 | End: 2021-05-16 | Stop reason: HOSPADM

## 2021-05-10 RX ORDER — FENTANYL CITRATE 50 UG/ML
INJECTION, SOLUTION INTRAMUSCULAR; INTRAVENOUS AS NEEDED
Status: DISCONTINUED | OUTPATIENT
Start: 2021-05-10 | End: 2021-05-10

## 2021-05-10 RX ORDER — LIDOCAINE HYDROCHLORIDE 10 MG/ML
INJECTION, SOLUTION EPIDURAL; INFILTRATION; INTRACAUDAL; PERINEURAL AS NEEDED
Status: DISCONTINUED | OUTPATIENT
Start: 2021-05-10 | End: 2021-05-10 | Stop reason: HOSPADM

## 2021-05-10 RX ORDER — FENTANYL CITRATE/PF 50 MCG/ML
25 SYRINGE (ML) INJECTION
Status: COMPLETED | OUTPATIENT
Start: 2021-05-10 | End: 2021-05-10

## 2021-05-10 RX ORDER — AMOXICILLIN 250 MG
2 CAPSULE ORAL DAILY
Status: DISCONTINUED | OUTPATIENT
Start: 2021-05-11 | End: 2021-05-16 | Stop reason: HOSPADM

## 2021-05-10 RX ORDER — HYDROMORPHONE HCL/PF 1 MG/ML
0.5 SYRINGE (ML) INJECTION
Status: DISCONTINUED | OUTPATIENT
Start: 2021-05-10 | End: 2021-05-16 | Stop reason: HOSPADM

## 2021-05-10 RX ORDER — PROPOFOL 10 MG/ML
INJECTION, EMULSION INTRAVENOUS AS NEEDED
Status: DISCONTINUED | OUTPATIENT
Start: 2021-05-10 | End: 2021-05-10

## 2021-05-10 RX ORDER — GLYCOPYRROLATE 0.2 MG/ML
INJECTION INTRAMUSCULAR; INTRAVENOUS AS NEEDED
Status: DISCONTINUED | OUTPATIENT
Start: 2021-05-10 | End: 2021-05-10

## 2021-05-10 RX ORDER — ROCURONIUM BROMIDE 10 MG/ML
INJECTION, SOLUTION INTRAVENOUS AS NEEDED
Status: DISCONTINUED | OUTPATIENT
Start: 2021-05-10 | End: 2021-05-10

## 2021-05-10 RX ORDER — CHLORHEXIDINE GLUCONATE 0.12 MG/ML
15 RINSE ORAL ONCE
Status: COMPLETED | OUTPATIENT
Start: 2021-05-10 | End: 2021-05-10

## 2021-05-10 RX ORDER — MANNITOL 250 MG/ML
INJECTION, SOLUTION INTRAVENOUS AS NEEDED
Status: DISCONTINUED | OUTPATIENT
Start: 2021-05-10 | End: 2021-05-10

## 2021-05-10 RX ORDER — DEXAMETHASONE SODIUM PHOSPHATE 4 MG/ML
4 INJECTION, SOLUTION INTRA-ARTICULAR; INTRALESIONAL; INTRAMUSCULAR; INTRAVENOUS; SOFT TISSUE EVERY 6 HOURS SCHEDULED
Status: COMPLETED | OUTPATIENT
Start: 2021-05-10 | End: 2021-05-13

## 2021-05-10 RX ORDER — POTASSIUM CHLORIDE 14.9 MG/ML
INJECTION INTRAVENOUS CONTINUOUS PRN
Status: DISCONTINUED | OUTPATIENT
Start: 2021-05-10 | End: 2021-05-10

## 2021-05-10 RX ORDER — CALCIUM CARBONATE 200(500)MG
1000 TABLET,CHEWABLE ORAL DAILY PRN
Status: DISCONTINUED | OUTPATIENT
Start: 2021-05-10 | End: 2021-05-16 | Stop reason: HOSPADM

## 2021-05-10 RX ORDER — LIDOCAINE HYDROCHLORIDE AND EPINEPHRINE 10; 10 MG/ML; UG/ML
INJECTION, SOLUTION INFILTRATION; PERINEURAL AS NEEDED
Status: DISCONTINUED | OUTPATIENT
Start: 2021-05-10 | End: 2021-05-10 | Stop reason: HOSPADM

## 2021-05-10 RX ORDER — FUROSEMIDE 10 MG/ML
INJECTION INTRAMUSCULAR; INTRAVENOUS AS NEEDED
Status: DISCONTINUED | OUTPATIENT
Start: 2021-05-10 | End: 2021-05-10

## 2021-05-10 RX ORDER — HYDROMORPHONE HCL 110MG/55ML
PATIENT CONTROLLED ANALGESIA SYRINGE INTRAVENOUS AS NEEDED
Status: DISCONTINUED | OUTPATIENT
Start: 2021-05-10 | End: 2021-05-10

## 2021-05-10 RX ORDER — CALCIUM CHLORIDE 100 MG/ML
INJECTION INTRAVENOUS; INTRAVENTRICULAR AS NEEDED
Status: DISCONTINUED | OUTPATIENT
Start: 2021-05-10 | End: 2021-05-10

## 2021-05-10 RX ORDER — EPHEDRINE SULFATE 50 MG/ML
INJECTION INTRAVENOUS AS NEEDED
Status: DISCONTINUED | OUTPATIENT
Start: 2021-05-10 | End: 2021-05-10

## 2021-05-10 RX ORDER — DEXAMETHASONE SODIUM PHOSPHATE 4 MG/ML
2 INJECTION, SOLUTION INTRA-ARTICULAR; INTRALESIONAL; INTRAMUSCULAR; INTRAVENOUS; SOFT TISSUE EVERY 6 HOURS SCHEDULED
Status: DISCONTINUED | OUTPATIENT
Start: 2021-05-16 | End: 2021-05-16 | Stop reason: HOSPADM

## 2021-05-10 RX ORDER — HYDROMORPHONE HCL/PF 1 MG/ML
0.5 SYRINGE (ML) INJECTION
Status: DISCONTINUED | OUTPATIENT
Start: 2021-05-10 | End: 2021-05-10

## 2021-05-10 RX ORDER — CEFAZOLIN SODIUM 1 G/3ML
INJECTION, POWDER, FOR SOLUTION INTRAMUSCULAR; INTRAVENOUS AS NEEDED
Status: DISCONTINUED | OUTPATIENT
Start: 2021-05-10 | End: 2021-05-10

## 2021-05-10 RX ORDER — SODIUM CHLORIDE, SODIUM GLUCONATE, SODIUM ACETATE, POTASSIUM CHLORIDE, MAGNESIUM CHLORIDE, SODIUM PHOSPHATE, DIBASIC, AND POTASSIUM PHOSPHATE .53; .5; .37; .037; .03; .012; .00082 G/100ML; G/100ML; G/100ML; G/100ML; G/100ML; G/100ML; G/100ML
125 INJECTION, SOLUTION INTRAVENOUS CONTINUOUS
Status: DISCONTINUED | OUTPATIENT
Start: 2021-05-10 | End: 2021-05-11

## 2021-05-10 RX ORDER — BUPIVACAINE HYDROCHLORIDE AND EPINEPHRINE 5; 5 MG/ML; UG/ML
INJECTION, SOLUTION EPIDURAL; INTRACAUDAL; PERINEURAL AS NEEDED
Status: DISCONTINUED | OUTPATIENT
Start: 2021-05-10 | End: 2021-05-10 | Stop reason: HOSPADM

## 2021-05-10 RX ORDER — METHOCARBAMOL 750 MG/1
750 TABLET, FILM COATED ORAL EVERY 6 HOURS SCHEDULED
Status: DISCONTINUED | OUTPATIENT
Start: 2021-05-10 | End: 2021-05-16 | Stop reason: HOSPADM

## 2021-05-10 RX ORDER — DEXAMETHASONE SODIUM PHOSPHATE 4 MG/ML
4 INJECTION, SOLUTION INTRA-ARTICULAR; INTRALESIONAL; INTRAMUSCULAR; INTRAVENOUS; SOFT TISSUE EVERY 8 HOURS
Status: COMPLETED | OUTPATIENT
Start: 2021-05-13 | End: 2021-05-16

## 2021-05-10 RX ORDER — POLYETHYLENE GLYCOL 3350 17 G/17G
17 POWDER, FOR SOLUTION ORAL DAILY
Status: DISCONTINUED | OUTPATIENT
Start: 2021-05-11 | End: 2021-05-16 | Stop reason: HOSPADM

## 2021-05-10 RX ORDER — ONDANSETRON 2 MG/ML
INJECTION INTRAMUSCULAR; INTRAVENOUS AS NEEDED
Status: DISCONTINUED | OUTPATIENT
Start: 2021-05-10 | End: 2021-05-10

## 2021-05-10 RX ORDER — MECLIZINE HYDROCHLORIDE 25 MG/1
25 TABLET ORAL 3 TIMES DAILY PRN
Status: DISCONTINUED | OUTPATIENT
Start: 2021-05-10 | End: 2021-05-16 | Stop reason: HOSPADM

## 2021-05-10 RX ORDER — DEXAMETHASONE SODIUM PHOSPHATE 4 MG/ML
2 INJECTION, SOLUTION INTRA-ARTICULAR; INTRALESIONAL; INTRAMUSCULAR; INTRAVENOUS; SOFT TISSUE EVERY 12 HOURS SCHEDULED
Status: DISCONTINUED | OUTPATIENT
Start: 2021-05-22 | End: 2021-05-16 | Stop reason: HOSPADM

## 2021-05-10 RX ORDER — ONDANSETRON 2 MG/ML
4 INJECTION INTRAMUSCULAR; INTRAVENOUS ONCE AS NEEDED
Status: DISCONTINUED | OUTPATIENT
Start: 2021-05-10 | End: 2021-05-10 | Stop reason: HOSPADM

## 2021-05-10 RX ORDER — SUCCINYLCHOLINE/SOD CL,ISO/PF 100 MG/5ML
SYRINGE (ML) INTRAVENOUS AS NEEDED
Status: DISCONTINUED | OUTPATIENT
Start: 2021-05-10 | End: 2021-05-10

## 2021-05-10 RX ORDER — DEXAMETHASONE SODIUM PHOSPHATE 4 MG/ML
2 INJECTION, SOLUTION INTRA-ARTICULAR; INTRALESIONAL; INTRAMUSCULAR; INTRAVENOUS; SOFT TISSUE EVERY 8 HOURS
Status: DISCONTINUED | OUTPATIENT
Start: 2021-05-19 | End: 2021-05-16 | Stop reason: HOSPADM

## 2021-05-10 RX ORDER — LIDOCAINE HYDROCHLORIDE 10 MG/ML
INJECTION, SOLUTION EPIDURAL; INFILTRATION; INTRACAUDAL; PERINEURAL AS NEEDED
Status: DISCONTINUED | OUTPATIENT
Start: 2021-05-10 | End: 2021-05-10

## 2021-05-10 RX ORDER — OXYCODONE HYDROCHLORIDE 10 MG/1
10 TABLET ORAL EVERY 4 HOURS PRN
Status: DISCONTINUED | OUTPATIENT
Start: 2021-05-10 | End: 2021-05-16 | Stop reason: HOSPADM

## 2021-05-10 RX ADMIN — MANNITOL 12.5 G: 12.5 INJECTION, SOLUTION INTRAVENOUS at 09:55

## 2021-05-10 RX ADMIN — ALBUMIN (HUMAN): 12.5 INJECTION, SOLUTION INTRAVENOUS at 14:49

## 2021-05-10 RX ADMIN — CALCIUM CHLORIDE 1 G: 100 INJECTION INTRAVENOUS; INTRAVENTRICULAR at 14:28

## 2021-05-10 RX ADMIN — CEFAZOLIN 3000 MG: 1 INJECTION, POWDER, FOR SOLUTION INTRAMUSCULAR; INTRAVENOUS at 12:57

## 2021-05-10 RX ADMIN — ALBUMIN (HUMAN): 12.5 INJECTION, SOLUTION INTRAVENOUS at 13:10

## 2021-05-10 RX ADMIN — POTASSIUM CHLORIDE: 14.9 INJECTION, SOLUTION INTRAVENOUS at 12:00

## 2021-05-10 RX ADMIN — ALBUTEROL SULFATE 6 PUFF: 90 AEROSOL, METERED RESPIRATORY (INHALATION) at 07:47

## 2021-05-10 RX ADMIN — SODIUM CHLORIDE: 0.9 INJECTION, SOLUTION INTRAVENOUS at 07:47

## 2021-05-10 RX ADMIN — Medication 160 MG: at 07:40

## 2021-05-10 RX ADMIN — FENTANYL CITRATE 50 MCG: 50 INJECTION INTRAMUSCULAR; INTRAVENOUS at 08:10

## 2021-05-10 RX ADMIN — Medication 25 MCG: at 18:26

## 2021-05-10 RX ADMIN — CHLORHEXIDINE GLUCONATE 15 ML: 1.2 SOLUTION ORAL at 06:12

## 2021-05-10 RX ADMIN — HYDROMORPHONE HYDROCHLORIDE 0.5 MG: 1 INJECTION, SOLUTION INTRAMUSCULAR; INTRAVENOUS; SUBCUTANEOUS at 21:00

## 2021-05-10 RX ADMIN — ONDANSETRON 4 MG: 2 INJECTION INTRAMUSCULAR; INTRAVENOUS at 23:56

## 2021-05-10 RX ADMIN — POTASSIUM CHLORIDE: 14.9 INJECTION, SOLUTION INTRAVENOUS at 14:21

## 2021-05-10 RX ADMIN — HYDROMORPHONE HYDROCHLORIDE 0.5 MG: 1 INJECTION, SOLUTION INTRAMUSCULAR; INTRAVENOUS; SUBCUTANEOUS at 18:59

## 2021-05-10 RX ADMIN — PROPOFOL 50 MG: 10 INJECTION, EMULSION INTRAVENOUS at 08:10

## 2021-05-10 RX ADMIN — ALBUTEROL SULFATE 6 PUFF: 90 AEROSOL, METERED RESPIRATORY (INHALATION) at 10:30

## 2021-05-10 RX ADMIN — LIDOCAINE HYDROCHLORIDE 100 MG: 10 INJECTION, SOLUTION EPIDURAL; INFILTRATION; INTRACAUDAL; PERINEURAL at 07:40

## 2021-05-10 RX ADMIN — SODIUM CHLORIDE: 0.9 INJECTION, SOLUTION INTRAVENOUS at 07:25

## 2021-05-10 RX ADMIN — SODIUM CHLORIDE 0.2 MCG/KG/HR: 9 INJECTION, SOLUTION INTRAVENOUS at 09:00

## 2021-05-10 RX ADMIN — FENTANYL CITRATE 50 MCG: 50 INJECTION INTRAMUSCULAR; INTRAVENOUS at 12:00

## 2021-05-10 RX ADMIN — DEXAMETHASONE SODIUM PHOSPHATE 10 MG: 10 INJECTION, SOLUTION INTRAMUSCULAR; INTRAVENOUS at 18:24

## 2021-05-10 RX ADMIN — SODIUM CHLORIDE 125 ML/HR: 0.9 INJECTION, SOLUTION INTRAVENOUS at 20:19

## 2021-05-10 RX ADMIN — CEFAZOLIN 3000 MG: 1 INJECTION, POWDER, FOR SOLUTION INTRAMUSCULAR; INTRAVENOUS at 09:06

## 2021-05-10 RX ADMIN — DEXAMETHASONE SODIUM PHOSPHATE 10 MG: 10 INJECTION, SOLUTION INTRAMUSCULAR; INTRAVENOUS at 08:20

## 2021-05-10 RX ADMIN — Medication 40 MG: at 07:41

## 2021-05-10 RX ADMIN — ROCURONIUM BROMIDE 30 MG: 50 INJECTION, SOLUTION INTRAVENOUS at 07:45

## 2021-05-10 RX ADMIN — INSULIN HUMAN 5 UNITS: 100 INJECTION, SOLUTION PARENTERAL at 13:26

## 2021-05-10 RX ADMIN — ONDANSETRON 4 MG: 2 INJECTION INTRAMUSCULAR; INTRAVENOUS at 08:20

## 2021-05-10 RX ADMIN — PHENYLEPHRINE HYDROCHLORIDE 20 MCG/MIN: 10 INJECTION INTRAVENOUS at 08:30

## 2021-05-10 RX ADMIN — SODIUM CHLORIDE, SODIUM LACTATE, POTASSIUM CHLORIDE, AND CALCIUM CHLORIDE: .6; .31; .03; .02 INJECTION, SOLUTION INTRAVENOUS at 08:06

## 2021-05-10 RX ADMIN — HYDROMORPHONE HYDROCHLORIDE 0.5 MG: 2 INJECTION, SOLUTION INTRAMUSCULAR; INTRAVENOUS; SUBCUTANEOUS at 08:20

## 2021-05-10 RX ADMIN — Medication 25 MCG: at 18:10

## 2021-05-10 RX ADMIN — SODIUM BICARBONATE 100 ML/HR: 84 INJECTION, SOLUTION INTRAVENOUS at 21:29

## 2021-05-10 RX ADMIN — LEVETIRACETAM 750 MG: 100 INJECTION, SOLUTION INTRAVENOUS at 20:58

## 2021-05-10 RX ADMIN — EPHEDRINE SULFATE 5 MG: 50 INJECTION, SOLUTION INTRAVENOUS at 08:17

## 2021-05-10 RX ADMIN — SODIUM CHLORIDE 125 ML/HR: 0.9 INJECTION, SOLUTION INTRAVENOUS at 19:16

## 2021-05-10 RX ADMIN — GLYCOPYRROLATE 0.2 MG: 0.2 INJECTION, SOLUTION INTRAMUSCULAR; INTRAVENOUS at 07:40

## 2021-05-10 RX ADMIN — HYDROMORPHONE HYDROCHLORIDE 0.5 MG: 1 INJECTION, SOLUTION INTRAMUSCULAR; INTRAVENOUS; SUBCUTANEOUS at 18:39

## 2021-05-10 RX ADMIN — PROPOFOL 250 MG: 10 INJECTION, EMULSION INTRAVENOUS at 07:40

## 2021-05-10 RX ADMIN — FENTANYL CITRATE 100 MCG: 50 INJECTION INTRAMUSCULAR; INTRAVENOUS at 07:40

## 2021-05-10 RX ADMIN — Medication 25 MCG: at 18:02

## 2021-05-10 RX ADMIN — PROPOFOL 100 MCG/KG/MIN: 10 INJECTION, EMULSION INTRAVENOUS at 08:30

## 2021-05-10 RX ADMIN — FUROSEMIDE 20 MG: 10 INJECTION, SOLUTION INTRAMUSCULAR; INTRAVENOUS at 09:55

## 2021-05-10 RX ADMIN — HYDROMORPHONE HYDROCHLORIDE 0.5 MG: 1 INJECTION, SOLUTION INTRAMUSCULAR; INTRAVENOUS; SUBCUTANEOUS at 18:31

## 2021-05-10 RX ADMIN — ALBUMIN (HUMAN): 12.5 INJECTION, SOLUTION INTRAVENOUS at 11:30

## 2021-05-10 RX ADMIN — Medication 25 MCG: at 18:19

## 2021-05-10 RX ADMIN — ALBUMIN (HUMAN): 12.5 INJECTION, SOLUTION INTRAVENOUS at 09:35

## 2021-05-10 RX ADMIN — DEXAMETHASONE SODIUM PHOSPHATE 4 MG: 4 INJECTION INTRA-ARTICULAR; INTRALESIONAL; INTRAMUSCULAR; INTRAVENOUS; SOFT TISSUE at 23:45

## 2021-05-10 RX ADMIN — DEXAMETHASONE SODIUM PHOSPHATE 4 MG: 4 INJECTION INTRA-ARTICULAR; INTRALESIONAL; INTRAMUSCULAR; INTRAVENOUS; SOFT TISSUE at 19:54

## 2021-05-10 RX ADMIN — CEFAZOLIN 3000 MG: 1 INJECTION, POWDER, FOR SOLUTION INTRAMUSCULAR; INTRAVENOUS at 16:56

## 2021-05-10 NOTE — INTERVAL H&P NOTE
H&P reviewed  After examining the patient I find no changes in the patients condition since the H&P had been written      Vitals:    05/10/21 0601   BP: 137/89   Pulse: 70   Resp: 18   Temp: (!) 96 4 °F (35 8 °C)   SpO2: 96%

## 2021-05-10 NOTE — ANESTHESIA POSTPROCEDURE EVALUATION
Post-Op Assessment Note    No complications documented  /65 (05/10/21 1800)    Temp 100 1 °F (37 8 °C) (05/10/21 1800)    Pulse 100 (05/10/21 1800)   Resp (!) 33 (05/10/21 1800)    SpO2 96 % (05/10/21 1800)      Two approx 2cm tongue lacs noted, one on tip of tongue and one on the left  No active bleeding noted

## 2021-05-10 NOTE — ANESTHESIA PROCEDURE NOTES
Arterial Line Insertion  Performed by: Keegan Andersen CRNA  Authorized by: Cory Rust MD   Consent: Verbal consent obtained  Written consent obtained  Consent given by: patient  Patient understanding: patient states understanding of the procedure being performed  Patient consent: the patient's understanding of the procedure matches consent given  Procedure consent: procedure consent matches procedure scheduled  Patient identity confirmed: anonymous protocol, patient vented/unresponsive and arm band  Time out: Immediately prior to procedure a "time out" was called to verify the correct patient, procedure, equipment, support staff and site/side marked as required  Preparation: Patient was prepped and draped in the usual sterile fashion    Indications: hemodynamic monitoring  Orientation:  Right  Location: radial artery  Sedation:  Patient sedated: no    Procedure Details:  Needle gauge: 20  Seldinger technique: Seldinger technique used  Number of attempts: 1    Post-procedure:  Post-procedure: dressing applied  Waveform: good waveform and waveform confirmed  Post-procedure CNS: normal and unchanged  Patient tolerance: patient tolerated the procedure well with no immediate complications

## 2021-05-10 NOTE — OP NOTE
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]    Procedure(s) (LRB):  left 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 (Left)    Specimen(s):  ID Type Source Tests Collected by Time Destination   1 : left cerebellar pontine angle mass Tissue Brain TISSUE EXAM Florecita Cordoba MD 5/10/2021 1044    2 : left cerebellar pontine angle mass Tissue Brain TISSUE EXAM Florecita Cordoba MD 5/10/2021 1623        Estimated Blood Loss:   250 mL    Drains:  Urethral Catheter Latex 16 Fr  (Active)   Number of days: 0       Anesthesia Type:   General    Operative Indications:  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]    Operative Findings:  Intraoperative pathology consistent with meningioma    Complications:   None     Procedure and Technique:  After adequate general endotracheal anesthesia the patient was placed lateral left side up on the operating table  An axillary roll was placed  All The head was placed into 3 point head fixation device and the stealth neuro navigational system was registered and calibrated  Image guidance with the use of the [address] was used throughout this case  Images were carefully loaded into the system and used for preoperative planning as well as intraoperative guidance it was critically useful for navigation and avoidance of critically important structures  The hair in the left retromastoid retroauricular region was clipped and the scalp was prepped with Betadine soap then DuraPrep  Double layer drapes were placed in normal fashion and a Betadine impregnated sticky drape was placed over these        A time-out was called and all parameters a time-out were followed    The skin began with a curvilinear incision in the left retromastoid region  Intraoperative guidance was used to map the position of the lateral and transverse sinus  The skin was infiltrated with 1% lidocaine with 1 100,000 epinephrine  15  Blade was used to incise the skin  Bovie and bipolar were used throughout the procedure to maintain hemostasis  The Bovie and a cutting setting was used to divide the tissues to the dorsal fascia  The dorsal fascia was then incised as was part of the sternocleidomastoid muscle  A cerebellar style within retractor was used to maintain operative exposure  Again utilizing stereotactic coordinates a trough was drilled at the posterior edge of the lateral sinus and the inferior edge of the transverse sinus  A bur hole was then made in the inferior air posterior region of the exposure and a roughly subcutaneously craniotomy flap was elevated from the underlying dura without a breach of the dura or the sinus  Bone wax was placed in the mastoid region at this point  The bone wax was again applied at the time of removal of instrumentation and dural reconstruction  The dura was then opened in a cruciate fashion  4-0 Nurolon sutures were used to maintain operative exposure  A rubber dam and chondroid were introduced into the region of the foramen magnum and the arachnoid was released so as to allow cerebral spinal fluid freely egress  A cotonoid was then placed into the region of the cerebellar pontine angle  Small veins and going into the superior petrosal sinus were coagulated and divided  Dissection was then carried out to the region of the 5th cranial nerve  This was carefully dissected and freed of arachnoid  Similar procedure was performed at the 7 8 nerve complex  The tumor was immediately identified above the 5th nerve and tenting into the tentorial incisor dura    The intraoperative microscope was used very screws and magnification to aid in the identification, illumination, and microdissection necessary to perform this procedure  A 11  Blade was then used to incise the tumor and portions were sent to the laboratory as specimen  The tumor was carefully debulked from within utilizing the Cavitron ultrasonic aspirated her as well as micro dissecting techniques  This was a very tedious and long technique because of the small working position and the relative position of the 5th nerve the tentorium in the lauren  Next a 2nd approach was designed between the 7 8 nerve complex and the 5th nerve  In this region a 2nd window was opened and the tumor was again debulked in this region  Next the tumor was pushed from the 07/08 nerve complex cephalad to the more superior exposure  This allowed for continued debulking of the tumor from the larger of the 2 windows and exposures so as to minimize any trauma to the 7 8 nerve complex  The tumor was quite vascular and coagulation was necessary throughout the debulking  Portion of the tumor was sent to the laboratory and via tele pathology this was consistent with a meningioma  I did not see mitotic figures to suggest a high-grade neoplasm  Dissection of the tumor was limited by the tolerance of the 7 8 nerve complex and specifically the brainstem auditory evoked responses  Dissection with the use of the retractor was interrupted each time the responses went greater than 1 millisecond delay  Continued debulking was then carried out and the tumor was debulked into the region of the posterior aspect of the cavernous sinus near [facility]  Copious quantities of irrigation were used to cleanse out the region  A small piece of Gelfoam with thrombin was placed at the posterior aspect of the tumor exposure  Valsalva to 35 cm of water produced no bleeding  surgicel was placed on the cerebellar hemisphere  Copious quantities of irrigation were used to cleanse out the region      The dura was approximated with interrupted inverted 4-0 Nurolon suture DuraSeal was then placed over this  The bone flap was replaced with the Biomet bone fixation system  Additional DuraSeal was placed over this  The muscle was approximated with interrupted inverted to 0 Vicryl suture the fascia was closed with interrupted inverted to 0 Vicryl suture  The scalp was closed with interrupted inverted to 0 Vicryl suture and a running 4-0 Monocryl in the skin  Histoacryl was placed over this  At the conclusion of this case the SSEP he has demonstrated no alteration  Motor-evoked potentials demonstrated a small is a normal amplitude in the right hand but all extremities were able to be stimulated  The brainstem auditory evoked response had returned to normal   Cranial nerves 3 6 and 5 demonstrated no spontaneous EMG activity       I was present for the entire procedure    Patient Disposition:  PACU     SIGNATURE: Nirmala Meza MD  DATE: May 10, 2021  TIME: 5:54 PM

## 2021-05-10 NOTE — ANESTHESIA POSTPROCEDURE EVALUATION
Post-Op Assessment Note    CV Status:  Stable  Pain Score: 0    Pain management: adequate     Mental Status:  Alert and awake   Hydration Status:  Stable   PONV Controlled:  None   Airway Patency:  Patent      Post Op Vitals Reviewed: Yes      Staff: CRNA   Comments: AAOx3, SV Nonobstructed, VSS, MAEx4, FAC        No complications documented      BP   141/74   Temp  100 1   Pulse  100   Resp   24   SpO2   96

## 2021-05-10 NOTE — OR NURSING
Patient's eye glasses given to patient's wife, Arturo, in the waiting room  Glasses were given to wife by me

## 2021-05-10 NOTE — INTERVAL H&P NOTE
H&P reviewed  After examining the patient I find no changes in the patients condition since the H&P had been written    Abdomen is flat and Non tender    Vitals:    05/10/21 0601   BP: 137/89   Pulse: 70   Resp: 18   Temp: (!) 96 4 °F (35 8 °C)   SpO2: 96%

## 2021-05-10 NOTE — ANESTHESIA PREPROCEDURE EVALUATION
Procedure:  left 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 (Left Head)    Relevant Problems   GI/HEPATIC   (+) Gastroesophageal reflux disease      NEURO/PSYCH   (+) Depression      Other   (+) Cerebellopontine angle tumor (HCC)   (+) Obesity        Physical Exam    Airway    Mallampati score: III  TM Distance: >3 FB  Neck ROM: full     Dental       Cardiovascular  Cardiovascular exam normal    Pulmonary  Pulmonary exam normal     Other Findings        Anesthesia Plan  ASA Score- 3     Anesthesia Type- general with ASA Monitors  Additional Monitors: arterial line  Airway Plan: ETT  Plan Factors-Exercise tolerance (METS): >4 METS  Chart reviewed  EKG reviewed  Imaging results reviewed  Existing labs reviewed  Patient summary reviewed  Patient is a current smoker  Patient smoked on day of surgery  Induction- intravenous  Postoperative Plan- Plan for postoperative opioid use  Planned trial extubation    Informed Consent- Anesthetic plan and risks discussed with patient  I personally reviewed this patient with the CRNA  Discussed and agreed on the Anesthesia Plan with the CRNA  Abdirashid Quiles

## 2021-05-11 ENCOUNTER — APPOINTMENT (INPATIENT)
Dept: RADIOLOGY | Facility: HOSPITAL | Age: 41
DRG: 025 | End: 2021-05-11
Payer: MEDICARE

## 2021-05-11 PROBLEM — E87.20 LACTIC ACIDOSIS: Status: ACTIVE | Noted: 2021-05-11

## 2021-05-11 PROBLEM — E87.2 LACTIC ACIDOSIS: Status: ACTIVE | Noted: 2021-05-11

## 2021-05-11 LAB
ALBUMIN SERPL BCP-MCNC: 3.5 G/DL (ref 3.5–5)
ALBUMIN SERPL BCP-MCNC: 3.7 G/DL (ref 3.5–5)
ALP SERPL-CCNC: 57 U/L (ref 46–116)
ALP SERPL-CCNC: 58 U/L (ref 46–116)
ALT SERPL W P-5'-P-CCNC: 202 U/L (ref 12–78)
ALT SERPL W P-5'-P-CCNC: 236 U/L (ref 12–78)
ANION GAP SERPL CALCULATED.3IONS-SCNC: 7 MMOL/L (ref 4–13)
ANION GAP SERPL CALCULATED.3IONS-SCNC: 8 MMOL/L (ref 4–13)
APTT PPP: 26 SECONDS (ref 23–37)
AST SERPL W P-5'-P-CCNC: 166 U/L (ref 5–45)
AST SERPL W P-5'-P-CCNC: 192 U/L (ref 5–45)
ATRIAL RATE: 42 BPM
BASE EXCESS BLDA CALC-SCNC: -2.9 MMOL/L
BASE EXCESS BLDA CALC-SCNC: 1.1 MMOL/L
BILIRUB SERPL-MCNC: 0.4 MG/DL (ref 0.2–1)
BILIRUB SERPL-MCNC: 0.43 MG/DL (ref 0.2–1)
BODY TEMPERATURE: 98.1 DEGREES FEHRENHEIT
BODY TEMPERATURE: 98.4 DEGREES FEHRENHEIT
BUN SERPL-MCNC: 5 MG/DL (ref 5–25)
BUN SERPL-MCNC: 5 MG/DL (ref 5–25)
CALCIUM SERPL-MCNC: 8.9 MG/DL (ref 8.3–10.1)
CALCIUM SERPL-MCNC: 9.2 MG/DL (ref 8.3–10.1)
CHLORIDE SERPL-SCNC: 110 MMOL/L (ref 100–108)
CHLORIDE SERPL-SCNC: 111 MMOL/L (ref 100–108)
CO2 SERPL-SCNC: 23 MMOL/L (ref 21–32)
CO2 SERPL-SCNC: 24 MMOL/L (ref 21–32)
CREAT SERPL-MCNC: 0.7 MG/DL (ref 0.6–1.3)
CREAT SERPL-MCNC: 0.74 MG/DL (ref 0.6–1.3)
ERYTHROCYTE [DISTWIDTH] IN BLOOD BY AUTOMATED COUNT: 12.4 % (ref 11.6–15.1)
GFR SERPL CREATININE-BSD FRML MDRD: 100 ML/MIN/1.73SQ M
GFR SERPL CREATININE-BSD FRML MDRD: 107 ML/MIN/1.73SQ M
GLUCOSE SERPL-MCNC: 138 MG/DL (ref 65–140)
GLUCOSE SERPL-MCNC: 154 MG/DL (ref 65–140)
HCO3 BLDA-SCNC: 20.9 MMOL/L (ref 22–28)
HCO3 BLDA-SCNC: 25.7 MMOL/L (ref 22–28)
HCT VFR BLD AUTO: 36.7 % (ref 34.8–46.1)
HGB BLD-MCNC: 12.6 G/DL (ref 11.5–15.4)
INR PPP: 1.08 (ref 0.84–1.19)
LACTATE SERPL-SCNC: 1.7 MMOL/L (ref 0.5–2)
LACTATE SERPL-SCNC: 2.5 MMOL/L (ref 0.5–2)
LACTATE SERPL-SCNC: 2.6 MMOL/L (ref 0.5–2)
MAGNESIUM SERPL-MCNC: 2 MG/DL (ref 1.6–2.6)
MCH RBC QN AUTO: 34.5 PG (ref 26.8–34.3)
MCHC RBC AUTO-ENTMCNC: 34.3 G/DL (ref 31.4–37.4)
MCV RBC AUTO: 101 FL (ref 82–98)
NASAL CANNULA: 2
NASAL CANNULA: 5
O2 CT BLDA-SCNC: 16.7 ML/DL (ref 16–23)
O2 CT BLDA-SCNC: 17.2 ML/DL (ref 16–23)
OXYHGB MFR BLDA: 90.2 % (ref 94–97)
OXYHGB MFR BLDA: 90.5 % (ref 94–97)
P AXIS: 60 DEGREES
PCO2 BLDA: 33.5 MM HG (ref 36–44)
PCO2 BLDA: 40.6 MM HG (ref 36–44)
PCO2 TEMP ADJ BLDA: 33.1 MM HG (ref 36–44)
PH BLD: 7.42 [PH] (ref 7.35–7.45)
PH BLDA: 7.41 [PH] (ref 7.35–7.45)
PH BLDA: 7.42 [PH] (ref 7.35–7.45)
PHOSPHATE SERPL-MCNC: 3.4 MG/DL (ref 2.7–4.5)
PLATELET # BLD AUTO: 181 THOUSANDS/UL (ref 149–390)
PMV BLD AUTO: 10.1 FL (ref 8.9–12.7)
PO2 BLD: 57.5 MM HG (ref 75–129)
PO2 BLDA: 58.2 MM HG (ref 75–129)
PO2 BLDA: 58.7 MM HG (ref 75–129)
POTASSIUM SERPL-SCNC: 3.8 MMOL/L (ref 3.5–5.3)
POTASSIUM SERPL-SCNC: 4 MMOL/L (ref 3.5–5.3)
PR INTERVAL: 150 MS
PROT SERPL-MCNC: 6.6 G/DL (ref 6.4–8.2)
PROT SERPL-MCNC: 7.1 G/DL (ref 6.4–8.2)
PROTHROMBIN TIME: 14 SECONDS (ref 11.6–14.5)
QRS AXIS: 54 DEGREES
QRSD INTERVAL: 88 MS
QT INTERVAL: 513 MS
QTC INTERVAL: 429 MS
RBC # BLD AUTO: 3.65 MILLION/UL (ref 3.81–5.12)
SODIUM SERPL-SCNC: 141 MMOL/L (ref 136–145)
SODIUM SERPL-SCNC: 142 MMOL/L (ref 136–145)
SPECIMEN SOURCE: ABNORMAL
T WAVE AXIS: 82 DEGREES
VENTRICULAR RATE: 42 BPM
WBC # BLD AUTO: 14.44 THOUSAND/UL (ref 4.31–10.16)

## 2021-05-11 PROCEDURE — 92526 ORAL FUNCTION THERAPY: CPT

## 2021-05-11 PROCEDURE — 85730 THROMBOPLASTIN TIME PARTIAL: CPT | Performed by: PHYSICIAN ASSISTANT

## 2021-05-11 PROCEDURE — 92610 EVALUATE SWALLOWING FUNCTION: CPT

## 2021-05-11 PROCEDURE — 93005 ELECTROCARDIOGRAM TRACING: CPT

## 2021-05-11 PROCEDURE — 99024 POSTOP FOLLOW-UP VISIT: CPT | Performed by: NURSE PRACTITIONER

## 2021-05-11 PROCEDURE — 99291 CRITICAL CARE FIRST HOUR: CPT | Performed by: EMERGENCY MEDICINE

## 2021-05-11 PROCEDURE — 93010 ELECTROCARDIOGRAM REPORT: CPT | Performed by: INTERNAL MEDICINE

## 2021-05-11 PROCEDURE — 71045 X-RAY EXAM CHEST 1 VIEW: CPT

## 2021-05-11 PROCEDURE — G1004 CDSM NDSC: HCPCS

## 2021-05-11 PROCEDURE — 83605 ASSAY OF LACTIC ACID: CPT | Performed by: PHYSICIAN ASSISTANT

## 2021-05-11 PROCEDURE — 70450 CT HEAD/BRAIN W/O DYE: CPT

## 2021-05-11 PROCEDURE — 99024 POSTOP FOLLOW-UP VISIT: CPT | Performed by: NEUROLOGICAL SURGERY

## 2021-05-11 PROCEDURE — 82805 BLOOD GASES W/O2 SATURATION: CPT | Performed by: PHYSICIAN ASSISTANT

## 2021-05-11 PROCEDURE — 85610 PROTHROMBIN TIME: CPT | Performed by: PHYSICIAN ASSISTANT

## 2021-05-11 PROCEDURE — 84100 ASSAY OF PHOSPHORUS: CPT | Performed by: PHYSICIAN ASSISTANT

## 2021-05-11 PROCEDURE — 70553 MRI BRAIN STEM W/O & W/DYE: CPT

## 2021-05-11 PROCEDURE — 83735 ASSAY OF MAGNESIUM: CPT | Performed by: PHYSICIAN ASSISTANT

## 2021-05-11 PROCEDURE — 85027 COMPLETE CBC AUTOMATED: CPT | Performed by: PHYSICIAN ASSISTANT

## 2021-05-11 PROCEDURE — 80053 COMPREHEN METABOLIC PANEL: CPT | Performed by: PHYSICIAN ASSISTANT

## 2021-05-11 PROCEDURE — 97163 PT EVAL HIGH COMPLEX 45 MIN: CPT

## 2021-05-11 PROCEDURE — A9585 GADOBUTROL INJECTION: HCPCS | Performed by: NURSE PRACTITIONER

## 2021-05-11 RX ORDER — ACETAMINOPHEN 325 MG/1
650 TABLET ORAL EVERY 6 HOURS PRN
Status: DISCONTINUED | OUTPATIENT
Start: 2021-05-11 | End: 2021-05-11

## 2021-05-11 RX ORDER — METOCLOPRAMIDE HCL 10 MG/2 ML
20 SYRINGE (ML) INJECTION ONCE
Status: COMPLETED | OUTPATIENT
Start: 2021-05-11 | End: 2021-05-11

## 2021-05-11 RX ORDER — ACETAMINOPHEN 325 MG/1
975 TABLET ORAL EVERY 8 HOURS SCHEDULED
Status: DISCONTINUED | OUTPATIENT
Start: 2021-05-11 | End: 2021-05-16 | Stop reason: HOSPADM

## 2021-05-11 RX ORDER — HYDRALAZINE HYDROCHLORIDE 20 MG/ML
10 INJECTION INTRAMUSCULAR; INTRAVENOUS EVERY 4 HOURS PRN
Status: DISCONTINUED | OUTPATIENT
Start: 2021-05-11 | End: 2021-05-16 | Stop reason: HOSPADM

## 2021-05-11 RX ORDER — POTASSIUM CHLORIDE 14.9 MG/ML
20 INJECTION INTRAVENOUS ONCE
Status: COMPLETED | OUTPATIENT
Start: 2021-05-11 | End: 2021-05-11

## 2021-05-11 RX ORDER — OLANZAPINE 5 MG/1
5 TABLET, ORALLY DISINTEGRATING ORAL
Status: DISCONTINUED | OUTPATIENT
Start: 2021-05-11 | End: 2021-05-12

## 2021-05-11 RX ORDER — MAGNESIUM SULFATE HEPTAHYDRATE 40 MG/ML
2 INJECTION, SOLUTION INTRAVENOUS ONCE
Status: COMPLETED | OUTPATIENT
Start: 2021-05-11 | End: 2021-05-11

## 2021-05-11 RX ORDER — ACETAMINOPHEN 650 MG/1
650 SUPPOSITORY RECTAL EVERY 4 HOURS PRN
Status: DISCONTINUED | OUTPATIENT
Start: 2021-05-11 | End: 2021-05-11

## 2021-05-11 RX ADMIN — GADOBUTROL 14 ML: 604.72 INJECTION INTRAVENOUS at 23:52

## 2021-05-11 RX ADMIN — ONDANSETRON 4 MG: 2 INJECTION INTRAMUSCULAR; INTRAVENOUS at 23:50

## 2021-05-11 RX ADMIN — HYDROMORPHONE HYDROCHLORIDE 0.5 MG: 1 INJECTION, SOLUTION INTRAMUSCULAR; INTRAVENOUS; SUBCUTANEOUS at 11:39

## 2021-05-11 RX ADMIN — OXYCODONE HYDROCHLORIDE 10 MG: 10 TABLET ORAL at 21:31

## 2021-05-11 RX ADMIN — HYDROMORPHONE HYDROCHLORIDE 0.5 MG: 1 INJECTION, SOLUTION INTRAMUSCULAR; INTRAVENOUS; SUBCUTANEOUS at 03:44

## 2021-05-11 RX ADMIN — HYDROMORPHONE HYDROCHLORIDE 0.5 MG: 1 INJECTION, SOLUTION INTRAMUSCULAR; INTRAVENOUS; SUBCUTANEOUS at 07:27

## 2021-05-11 RX ADMIN — DEXAMETHASONE SODIUM PHOSPHATE 4 MG: 4 INJECTION INTRA-ARTICULAR; INTRALESIONAL; INTRAMUSCULAR; INTRAVENOUS; SOFT TISSUE at 23:50

## 2021-05-11 RX ADMIN — DEXAMETHASONE SODIUM PHOSPHATE 4 MG: 4 INJECTION INTRA-ARTICULAR; INTRALESIONAL; INTRAMUSCULAR; INTRAVENOUS; SOFT TISSUE at 18:06

## 2021-05-11 RX ADMIN — POTASSIUM CHLORIDE 20 MEQ: 14.9 INJECTION, SOLUTION INTRAVENOUS at 09:55

## 2021-05-11 RX ADMIN — ONDANSETRON 4 MG: 2 INJECTION INTRAMUSCULAR; INTRAVENOUS at 09:51

## 2021-05-11 RX ADMIN — HYDRALAZINE HYDROCHLORIDE 10 MG: 20 INJECTION INTRAMUSCULAR; INTRAVENOUS at 23:57

## 2021-05-11 RX ADMIN — HYDROMORPHONE HYDROCHLORIDE 0.5 MG: 1 INJECTION, SOLUTION INTRAMUSCULAR; INTRAVENOUS; SUBCUTANEOUS at 23:51

## 2021-05-11 RX ADMIN — HYDROMORPHONE HYDROCHLORIDE 0.5 MG: 1 INJECTION, SOLUTION INTRAMUSCULAR; INTRAVENOUS; SUBCUTANEOUS at 00:29

## 2021-05-11 RX ADMIN — CEFAZOLIN SODIUM 1000 MG: 1 SOLUTION INTRAVENOUS at 07:41

## 2021-05-11 RX ADMIN — CEFAZOLIN SODIUM 1000 MG: 1 SOLUTION INTRAVENOUS at 00:26

## 2021-05-11 RX ADMIN — MAGNESIUM SULFATE HEPTAHYDRATE 2 G: 40 INJECTION, SOLUTION INTRAVENOUS at 14:09

## 2021-05-11 RX ADMIN — DEXAMETHASONE SODIUM PHOSPHATE 4 MG: 4 INJECTION INTRA-ARTICULAR; INTRALESIONAL; INTRAMUSCULAR; INTRAVENOUS; SOFT TISSUE at 11:43

## 2021-05-11 RX ADMIN — HYDROMORPHONE HYDROCHLORIDE 0.5 MG: 1 INJECTION, SOLUTION INTRAMUSCULAR; INTRAVENOUS; SUBCUTANEOUS at 15:18

## 2021-05-11 RX ADMIN — LEVETIRACETAM 750 MG: 100 INJECTION, SOLUTION INTRAVENOUS at 21:31

## 2021-05-11 RX ADMIN — METHOCARBAMOL TABLETS 750 MG: 750 TABLET, COATED ORAL at 16:38

## 2021-05-11 RX ADMIN — ACETAMINOPHEN 975 MG: 325 TABLET, FILM COATED ORAL at 18:05

## 2021-05-11 RX ADMIN — LEVETIRACETAM 750 MG: 100 INJECTION, SOLUTION INTRAVENOUS at 10:00

## 2021-05-11 RX ADMIN — METOCLOPRAMIDE 20 MG: 5 INJECTION, SOLUTION INTRAMUSCULAR; INTRAVENOUS at 14:39

## 2021-05-11 RX ADMIN — METHOCARBAMOL TABLETS 750 MG: 750 TABLET, COATED ORAL at 23:52

## 2021-05-11 RX ADMIN — OLANZAPINE 5 MG: 5 TABLET, ORALLY DISINTEGRATING ORAL at 21:31

## 2021-05-11 RX ADMIN — ACETAMINOPHEN 975 MG: 325 TABLET, FILM COATED ORAL at 21:30

## 2021-05-11 RX ADMIN — DEXAMETHASONE SODIUM PHOSPHATE 4 MG: 4 INJECTION INTRA-ARTICULAR; INTRALESIONAL; INTRAMUSCULAR; INTRAVENOUS; SOFT TISSUE at 06:26

## 2021-05-11 RX ADMIN — OXYCODONE HYDROCHLORIDE 5 MG: 5 TABLET ORAL at 16:38

## 2021-05-11 NOTE — PHYSICAL THERAPY NOTE
Physical Therapy Evaluation     Patient's Name: Ernst Barbosa    Admitting Diagnosis  Cerebellopontine angle tumor [facility]) [D33 3]    Problem List  Patient Active Problem List   Diagnosis    Cerebellopontine angle tumor ([address] )    Depression    Gastroesophageal reflux disease    Obesity    Lactic acidosis       Past Medical History  Past Medical History:   Diagnosis Date    Always tired     Anesthesia     "woke up during one ECT treatment also during wisdom teeth"    Balance problems     Bilateral numbness and tingling of arms and legs     "most of the time"    Chest pain      a few months ago went to ER [facility]," had testing and told everything ok"    Chronic pain disorder     Depressive disorder     Dizziness     "on way to work and felt like going to pass out, to ER and had CT SCAN/MRI brain; found brain mass"    Family history of bleeding disorder     "pt reports Mom has clotting problem"    GERD (gastroesophageal reflux disease)     H1N1 influenza with pneumonia 2011    Headache     Joint pain     neck and shoulders    Migraine     occas    Muscle weakness     Nausea & vomiting     occas    Risk for falls     Shortness of breath     "on exertion feels related to weight gain"    Swollen feet     Tinnitus     Wears glasses     and contacts; will wear glasses DOS    Word finding difficulty     "feels words dont come quickly and also forgetful"       Past Surgical History  Past Surgical History:   Procedure Laterality Date    COLONOSCOPY      ELECTROCONVULSIVE THERAPY      19 treatments    HYSTERECTOMY      MT EXCIS TRANSTEMP CP ANGLE TUMOR Left 5/10/2021    Procedure: left 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;  Surgeon: Breanna Hutchison MD;  Location: [facility];  Service: Neurosurgery    WISDOM TOOTH EXTRACTION          05/11/21 0905   PT Last Visit   PT Visit Date 05/11/21   Note Type   Note type Evaluation   Pain Assessment   Pain Assessment Tool 0-10   Pain Score 8   Pain Location/Orientation Location: Head   Hospital Pain Intervention(s) Repositioned   Home Living   Type of [place] to live on main level with bedroom/bathroom; Two level  (1 AMRIT)   Prior Function   Level of [place] Independent with ADLs and functional mobility   Lives With Spouse  (wife works during the day)   Bin [address] in the last 6 months 0   Vocational Full time employment  ()   Restrictions/Precautions   Wells Katt Bearing Precautions Per Order No   Other Precautions Pain; Fall Risk;Multiple lines;Telemetry; Bed Alarm; Chair Alarm;Cognitive;O2   General   Family/Caregiver Present No   Cognition   Orientation Level Oriented X4   RLE Assessment   RLE Assessment WFL   LLE Assessment   LLE Assessment WFL   Coordination   Movements are Fluid and Coordinated 0   Coordination and Movement Description slow, increased time to plan   Bed Mobility   Supine to Sit 2  Maximal assistance   Additional items Assist x 2   Sit to Supine 2  Maximal assistance   Additional items Assist x 2   Transfers   Sit to Stand 2  Maximal assistance   Additional items Assist x 2   Stand to Sit 2  Maximal assistance   Additional items Assist x 2   Ambulation/Elevation   Gait pattern Excessively slow; Shuffling;Decreased foot clearance   Gait Assistance 3  Moderate assist   Additional items Assist x 2   Assistive Device Other (Comment)  (HHA)   Distance single side step   Balance   Static Sitting Poor +   Dynamic Sitting Poor   Ambulatory Zero   Endurance Deficit   Endurance Deficit Yes   Endurance Deficit Description limited by fatigue, pain, nausea, dizziness, double vision   Activity Tolerance   Activity Tolerance Patient limited by fatigue;Patient limited by pain   Nurse Made Aware yes, nsg gave clearance to work with pt   Assessment   Prognosis Fair   Problem List Decreased strength;Decreased endurance; Impaired balance;Decreased mobility; Decreased coordination;Decreased safety awareness;Decreased cognition;Pain; Impaired judgement;Decreased range of motion   Assessment Pt is 41 y o  female seen for PT evaluation s/p admit to [facility] on 5/10/2021 w/ Cerebellopontine angle tumor ([facility] [address] ) s/p resection  PT consulted to assess pt's functional mobility and d/c needs  Order placed for PT eval and tx, w/ up as tolerated order  Comorbidities affecting pt's physical performance at time of assessment include:  has a past medical history of Always tired, Anesthesia, Balance problems, Bilateral numbness and tingling of arms and legs, Chest pain, Chronic pain disorder, Depressive disorder, Dizziness, Family history of bleeding disorder, GERD (gastroesophageal reflux disease), H1N1 influenza with pneumonia, Headache, Joint pain, Migraine, Muscle weakness, Nausea & vomiting, Risk for falls, Shortness of breath, Swollen feet, Tinnitus, Wears glasses, and Word finding difficulty  PTA, pt was ambulates unrestricted distances and all terrain, lives in multi-level home and works full time  Personal factors affecting pt at time of IE include: ambulating w/ assistive device, stairs to enter home, inability to ambulate household distances, inability to navigate level surfaces w/o external assistance, decreased cognition, decreased initiation and engagement, inability to perform current job functions, unable to perform physical activity, limited insight into impairments, inability to perform IADLs and inability to perform ADLs  Please find objective findings from PT assessment regarding body systems outlined above with impairments and limitations including weakness, impaired balance, decreased endurance, impaired coordination, gait deviations, pain, decreased activity tolerance, decreased functional mobility tolerance, decreased safety awareness, impaired judgement, fall risk, SOB upon exertion and decreased cognition  Pt required A for LE management and Max A to upright trunk  Noted constant double vision and nausea during mobility assessment  Required Ax2 to complete standing  Poor ability to motor plan ambulation  Additional mobility limited with + vomiting  The following objective measures performed on IE also reveal limitations: The patient's AM-PAC Basic Mobility Inpatient Short Form Low Function Raw Score 15 , Standardized Score is 23 9  A standardized score less 42 9 suggests the patient may benefit from discharge to post-acute rehab services  Please also refer to the recommendation of the Physical Therapist for safe discharge planning  Pt's clinical presentation is currently unstable/unpredictable seen in pt's presentation of critical care monitoring, O2, nasuea  Pt to benefit from continued PT tx to address deficits as defined above and maximize level of functional independent mobility and consistency  From PT/mobility standpoint, recommendation at time of d/c would be post acute rehabilitation services pending progress in order to facilitate return to PLOF  Barriers to Discharge Inaccessible home environment;Decreased caregiver support   Goals   Patient Goals To go home   STG Expiration Date 05/23/21   Short Term Goal #1 1  Complete bed mobility and transfers I to decrease need for caregiver in home  2  Ambulate 300' I to complete household and community mobility without A  3  Improve dynamic balance to good to decrease need for UE support during ambulation  4  Be educated & demonstate 12 steps to be able to enter home without A  Plan   Treatment/Interventions OT; Spoke to case management;Spoke to nursing;Gait training;Bed mobility; Patient/family training; Endurance training;LE strengthening/ROM; Functional transfer training   PT Frequency Other (Comment)  (3-5x/wk)   Recommendation   PT Discharge Recommendation Post acute rehabilitation services  (pt may progress pending LOS to achieve goals to return home)   PT - OK to Discharge No   AM-PAC Basic Mobility Inpatient   Turning in Bed Without Bedrails 2   Lying on Back to Sitting on Edge of Flat Bed 2   Moving Bed to Chair 1   Standing Up From Chair 1   Walk in Room 1   Climb 3-5 Stairs 1   Basic Mobility Inpatient Raw Score 8   Turning Head Towards Sound 4   Follow Simple Instructions 3   Low Function Basic Mobility Raw Score 15   Low Function Basic Mobility Standardized Score 23 9           Leila Cloud, PT

## 2021-05-11 NOTE — ASSESSMENT & PLAN NOTE
POD 1 left 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 (Left)  · Pre op complains of pain behind her left eye, headaches, and occasionally diplopia  She also had numbness and tingling bilaterally in her arms, RUE weakness  and developed some lightheadedness with balance difficulties  · Pathology pending    Imaging:    CT head wo 5/11/21:Status post left retromastoid craniotomy for mass resection  There is hemorrhage within the left lateral prepontine cistern corresponding to the site of the previous mass  Small amount of air and fluid is present  There appears to be a small parenchymal hemorrhage within the adjacent left lateral lauren, measuring approximately 1 1 cm in maximum oblique dimension  Small amount of subarachnoid hemorrhage is seen in the right parafalcine region posteriorly and extending along the superior tentorium  Plan:  · Continue frequent neurological checks  · Reviewed imaging with patient   · STAT CT head with any neurological decline including drop GCS of 2pts within 1 hr   · Ordered MRI brain w/wo to further evaluate resection and postop changes  · Recommend SBP <160mmHg  · Keppra 750mg Q 12H for seziure ppx   · Continue 72 hour Decadron taper  · Medical management and pain control per primary team  · Mobilize with PT/OT  · Speech eval pending   · DVT PPX: SCDs only at this time  Would recommend additional CT head for stability prior to initiation of pharmacological DVT PPX  Neurosurgery will continue to follow closely, MRI brain pending, call with any questions or concerns

## 2021-05-11 NOTE — PROGRESS NOTES
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

## 2021-05-11 NOTE — MALNUTRITION/BMI
This medical record reflects one or more clinical indicators suggestive of morbid obesity  BMI Findings:  Adult BMI Classifications: Morbid Obesity 50-59  9(BMI = 51 73)     Body mass index is 51 84 kg/m²  See Nutrition note dated 5-11-21 for additional details  Completed nutrition assessment is viewable in the nutrition documentation

## 2021-05-11 NOTE — PROGRESS NOTES
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[facility])  Assessment & Plan  POD 1 left 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 (Left)  · Pre op complains of pain behind her left eye, headaches, and occasionally diplopia  She also had numbness and tingling bilaterally in her arms, RUE weakness  and developed some lightheadedness with balance difficulties  · Pathology pending    Imaging:    CT head wo 5/11/21:Status post left retromastoid craniotomy for mass resection  There is hemorrhage within the left lateral prepontine cistern corresponding to the site of the previous mass  Small amount of air and fluid is present  There appears to be a small parenchymal hemorrhage within the adjacent left lateral lauren, measuring approximately 1 1 cm in maximum oblique dimension  Small amount of subarachnoid hemorrhage is seen in the right parafalcine region posteriorly and extending along the superior tentorium  Plan:  · Continue frequent neurological checks  · Reviewed imaging with patient   · STAT CT head with any neurological decline including drop GCS of 2pts within 1 hr   · Ordered MRI brain w/wo to further evaluate resection and postop changes  · Recommend SBP <160mmHg  · Keppra 750mg Q 12H for seziure ppx   · Continue 72 hour Decadron taper  · Medical management and pain control per primary team  · Mobilize with PT/OT  · Speech eval pending   · DVT PPX: SCDs only at this time  Would recommend additional CT head for stability prior to initiation of pharmacological DVT PPX  Neurosurgery will continue to follow closely, MRI brain pending, call with any questions or concerns         Lactic acidosis  Assessment & Plan  Patient developed this postop  · Lactic acid trending down from 6 7 to 2 5  · Patient remains on IVFs  · Continue to monitor and trend  · Management per primary team      Subjective/Objective      Chief Complaint: "I threw up"    Subjective:  Patient currently complaining of 9/10 incisional pain and headache, patient reports her current pain regimen helps with her pain  She also endorses some blurry vision and states she vomited this morning  She also endorses numbness in her left thumb, pointer, and middle finger and tingling in her right fingers  She denies any dizziness, chest pain, shortness of breath, abdominal pain, diarrhea, no problems with bowel or bladder, no new weakness or numbness/tingling  Patient has Turcios catheter in place  Patient did develop lactic acidosis postoperatively, lactic acid today 2 5 on IVFs  She also had a few desaturations and is on a simple mask  Objective:  Patient comfortably lying in bed, NAD  I/O       05/09 0701 - 05/10 0700 05/10 0701 - 05/11 0700 05/11 0701 - 05/12 0700    I V  (mL/kg)  8103 8 (59 2)     IV Piggyback  1350     Total Intake(mL/kg)  9453 8 (69)     Urine (mL/kg/hr)  8810 (2 7)     Blood  250     Total Output  9060     Net  +393 8                  Invasive Devices     Peripheral Intravenous Line            Peripheral IV 05/10/21 Left Arm 1 day    Peripheral IV 05/10/21 Right Wrist 1 day          Arterial Line            Arterial Line 05/10/21 Right Radial 1 day          Drain            Urethral Catheter Latex 16 Fr  1 day                Physical Exam:  Vitals: Blood pressure 129/71, pulse (!) 54, temperature 98 1 °F (36 7 °C), temperature source Oral, resp  rate (!) 26, height 5' 4" (1 626 m), weight (!) 137 kg (301 lb 5 9 oz), SpO2 92 %  ,Body mass index is 51 73 kg/m²      Hemodynamic Monitoring: MAP: Arterial Line MAP (mmHg): 86 mmHg    General appearance: alert, appears stated age, cooperative and no distress  Head:  Left retromastoid craniotomy incision clean, dry, intact no erythema or drainage noted  Eyes: EOMI, PERRL, intermittent right medial eye drift and nystagmus  Neck: supple, symmetrical, trachea midline   Lungs: non labored breathing, on simple mask   Heart: regular heart rate  Neurologic:   Mental status: Alert, oriented x3, thought content appropriate, some dysarthria, following commands  Cranial nerves: grossly intact (Cranial nerves II-XII) except slight left facial droop, tongue laceration, intermittent right medial eye drift and nystagmus  Sensory: normal to LT in all extremities x4, except dullness to light touch in left thumb, pointer, and middle finger   JPS and DST intact  Motor: moving all extremities, strength 5/5 throughout  Reflexes: 2+ and symmetric in UEs and LEs 3+ and symmetric, no Yost's appreciated but left clonus noted  Coordination:  Mild left dysmetria but patient able to self-correct, no drift bilaterally      Lab Results:  Results from last 7 days   Lab Units 05/11/21  0450 05/10/21  1957 05/10/21  1802 05/10/21  1746   WBC Thousand/uL 14 44* 11 45* 7 19  --    HEMOGLOBIN g/dL 12 6 13 1 12 8  --    HEMATOCRIT % 36 7 39 6 37 4  --    PLATELETS Thousands/uL 181 188 179  --    NEUTROS PCT %  --  90*  --   --    MONOS PCT %  --  4  --   --    MONO PCT %  --   --   --  1*     Results from last 7 days   Lab Units 05/11/21 0451 05/11/21  0002 05/10/21  1958 05/10/21  1722  05/10/21  1604 05/10/21  1503   POTASSIUM mmol/L 3 8 4 0 4 5  --    < >  --   --    CHLORIDE mmol/L 111* 110* 113*  --    < >  --   --    CO2 mmol/L 24 23 17*  --    < >  --   --    CO2, I-STAT mmol/L  --   --   --  20*  --  17* 17*   BUN mg/dL 5 5 4*  --    < >  --   --    CREATININE mg/dL 0 70 0 74 0 98  --    < >  --   --    CALCIUM mg/dL 8 9 9 2 9 0  --    < >  --   --    ALK PHOS U/L 57 58 60  --   --   --   --    ALT U/L 202* 236* 257*  --   --   --   --    AST U/L 166* 192* 206*  --   --   --   --    GLUCOSE, ISTAT mg/dl  --   --   -- 139  --  156* 153*    < > = values in this interval not displayed  Results from last 7 days   Lab Units 05/11/21  0451 05/10/21  1958 05/10/21  1640   MAGNESIUM mg/dL 2 0 1 9 1 7     Results from last 7 days   Lab Units 05/11/21  0451 05/10/21  1958   PHOSPHORUS mg/dL 3 4 2 7     Results from last 7 days   Lab Units 05/11/21  0452 05/10/21  1958   INR  1 08 1 05   PTT seconds 26  --      No results found for: TROPONINT  ABG:  Lab Results   Component Value Date    PHART 7 419 05/11/2021    RYQ8COT 40 6 05/11/2021    PO2ART 58 2 (LL) 05/11/2021    RYX8BMY 25 7 05/11/2021    BEART 1 1 05/11/2021    SOURCE Line, Arterial 05/11/2021       Imaging Studies: I have personally reviewed pertinent reports  and I have personally reviewed pertinent films in PACS    Ct Head Wo Contrast    Result Date: 5/11/2021  Impression: Status post left retromastoid craniotomy for mass resection  There is hemorrhage within the left lateral prepontine cistern corresponding to the site of the previous mass  Small amount of air and fluid is present  There appears to be a small parenchymal hemorrhage within the adjacent left lateral lauren, measuring approximately 1 1 cm in maximum oblique dimension  Small amount of subarachnoid hemorrhage is seen in the right parafalcine region posteriorly and extending along the superior tentorium  This examination was marked "immediate notification" in Epic in order to begin the standard process by which the radiology reading room liaison alerts the referring practitioner  Workstation performed: [id number]       EKG, Pathology, and Other Studies: I have personally reviewed pertinent reports          VTE Mechanical Prophylaxis: sequential compression device

## 2021-05-11 NOTE — PLAN OF CARE
Problem: Nutrition/Hydration-ADULT  Goal: Nutrient/Hydration intake appropriate for improving, restoring or maintaining nutritional needs  Description: Monitor and assess patient's nutrition/hydration status for malnutrition  Collaborate with interdisciplinary team and initiate plan and interventions as ordered  Monitor patient's weight and dietary intake as ordered or per policy  Utilize nutrition screening tool and intervene as necessary  Determine patient's food preferences and provide high-protein, high-caloric foods as appropriate       INTERVENTIONS:  - Monitor oral intake, urinary output, labs, and treatment plans  - Assess nutrition and hydration status and recommend course of action  - Evaluate amount of meals eaten  - Assist patient with eating if necessary   - Allow adequate time for meals  - Recommend/ encourage appropriate diets, oral nutritional supplements, and vitamin/mineral supplements  - Order, calculate, and assess calorie counts as needed  - Recommend, monitor, and adjust tube feedings and TPN/PPN based on assessed needs  - Assess need for intravenous fluids  - Provide specific nutrition/hydration education as appropriate  - Include patient/family/caregiver in decisions related to nutrition  5/11/2021 1458 by Cinthya Cid RD  Outcome: Not Progressing  5/11/2021 1457 by Cinthya Cid RD   NPO

## 2021-05-11 NOTE — PLAN OF CARE
Problem: PHYSICAL THERAPY ADULT  Goal: Performs mobility at highest level of function for planned discharge setting  See evaluation for individualized goals  Description: Treatment/Interventions: OT, Spoke to case management, Spoke to nursing, Gait training, Bed mobility, Patient/family training, Endurance training, LE strengthening/ROM, Functional transfer training          See flowsheet documentation for full assessment, interventions and recommendations  Note: Prognosis: Fair  Problem List: Decreased strength, Decreased endurance, Impaired balance, Decreased mobility, Decreased coordination, Decreased safety awareness, Decreased cognition, Pain, Impaired judgement, Decreased range of motion  Assessment: (P) Pt is 41 y o  female seen for PT evaluation s/p admit to [facility] on 5/10/2021 w/ Cerebellopontine angle tumor (HCC) s/p resection  PT consulted to assess pt's functional mobility and d/c needs  Order placed for PT eval and tx, w/ up as tolerated order  Comorbidities affecting pt's physical performance at time of assessment include:  has a past medical history of Always tired, Anesthesia, Balance problems, Bilateral numbness and tingling of arms and legs, Chest pain, Chronic pain disorder, Depressive disorder, Dizziness, Family history of bleeding disorder, GERD (gastroesophageal reflux disease), H1N1 influenza with pneumonia, Headache, Joint pain, Migraine, Muscle weakness, Nausea & vomiting, Risk for falls, Shortness of breath, Swollen feet, Tinnitus, Wears glasses, and Word finding difficulty  PTA, pt was ambulates unrestricted distances and all terrain, lives in multi-level home and works full time   Personal factors affecting pt at time of IE include: ambulating w/ assistive device, stairs to enter home, inability to ambulate household distances, inability to navigate level surfaces w/o external assistance, decreased cognition, decreased initiation and engagement, inability to perform current job functions, unable to perform physical activity, limited insight into impairments, inability to perform IADLs and inability to perform ADLs  Please find objective findings from PT assessment regarding body systems outlined above with impairments and limitations including weakness, impaired balance, decreased endurance, impaired coordination, gait deviations, pain, decreased activity tolerance, decreased functional mobility tolerance, decreased safety awareness, impaired judgement, fall risk, SOB upon exertion and decreased cognition  Pt required A for LE management and Max A to upright trunk  Noted constant double vision and nausea during mobility assessment  Required Ax2 to complete standing  Poor ability to motor plan ambulation  Additional mobility limited with + vomiting  The following objective measures performed on IE also reveal limitations: The patient's AM-PAC Basic Mobility Inpatient Short Form Low Function Raw Score 15 , Standardized Score is 23 9  A standardized score less 42 9 suggests the patient may benefit from discharge to post-acute rehab services  Please also refer to the recommendation of the Physical Therapist for safe discharge planning  Pt's clinical presentation is currently unstable/unpredictable seen in pt's presentation of  Barriers to Discharge: Inaccessible home environment, Decreased caregiver support        PT Discharge Recommendation: Post acute rehabilitation services(pt may progress pending LOS to achieve goals to return home)     PT - OK to Discharge: No    See flowsheet documentation for full assessment

## 2021-05-11 NOTE — ASSESSMENT & PLAN NOTE
Patient developed this postop  · Lactic acid trending down from 6 7 to 2 5  · Patient remains on IVFs  · Continue to monitor and trend  · Management per primary team

## 2021-05-11 NOTE — SPEECH THERAPY NOTE
Speech Language/Pathology    Speech/Language Pathology Progress Note    Patient Name: Nataliia Lucas  FRSOL'X Date: 5/11/2021       Subjective:  "I just want water, or ice"  Pt just received pain meds  She is somewhat lethargic but wants to try some po  Objective:  Pt remains npo, seen for ongoing dx dysphagia tx  Trialed w/ thin, ice, soft solids, jello & puree  Adequate but slow retrieval of all w/ adequate oral control with puree, liquids, jello  Noted prolonged mastication of the soft solids w/ need for liquids to wash & clear  Preferred to continue w/ the puree, thin & refused further trials of the soft or harder solids  Swallow is prompt, hyolaryngeal rise is wfl, needs mult swallows at times  Assessment:  Pt able to tolerate puree, thin  Noted mild dysphagia w/ the solids w/prolonged manipulation    Pt prefers the liquids & puree     Plan/Recommendations:  Begin full liquids for now, thin ok  Will follow for recs & advancement when pt agreeable

## 2021-05-11 NOTE — CONSULTS
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     History of Present Illness   HX and PE limited by: not limited   Jin Cervantes is a 41 y o  female with a PMH significant migraine, anxiety/depression, and obesity who presented to the hospital today for an outpatient, scheduled L 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 with Dr Tiffanie Martins  Pt was seen in the [facility] ER on 4/12/21with complaints of dizziness and headaches x 1 month that has been getting progressively worse  Pt was found to have had a 2 3 x 2 2 x 2 5 cm enhancing extra-axial mass in the left petroclival region with mass effect on the brainstem with compression of the lauren  Pt was discharged home with instructions to follow-up with nsgy as an outpatient  Pt was seen by Dr Tiffanie Martins and elected to undergo surgery for resection with plans to complete post-surgical radiation therapy  Pt presented to the ICU today s/p a L retromastoid craniotomy for resection of this mass  Pt was noted to be acidotic with an elevated lactic acid intra and post-op  Pt received a total of 3500cc of LR and 3700cc of normal saline in the OR  No drains placed  Pt returns to the ICU extubated and on 2L NC  Pt was groggy from anesthesia and complaining of biju arm pain which is likely positioning the patient was in during the surgery  Pt noted to have slurred speech and a subtle left facial droop  She does have 2 tongue lacerations with some mild swelling  Dr Tiffanie Martins with nsgy was made aware of the droop and speech,stated that this can be expected post-operatively  Will continue to closely monitor  Repeat labs were drawn upon arrival to the ICU given noted acidosis in OR   Pt compensating with persistently low bicarb and high base deficit  Pt was started on a bicarb gtt @ 100  Will continue to trend lactic and get repeat labs at midnight  Pt with all soft compartments, no abdominal pain  Overall, she appears well  History obtained from chart review and the patient   -------------------------------------------------------------------------------------------------------------  Assessment and Plan:    Neuro:    Diagnosis: cerebellopontine angle tumor s/p resection   o 4/12/21 - presented to ER with complaints of dizziness and HA x 1 month, progressively worsening  o 4/12 CT head - 2 3 cm partially calcified mass in the left cerebellopontine angle with regional mass effect  o 4/12 MRI brain - 2 3 x 2 2 x 2 5 cm enhancing extra-axial mass in the left petroclival region 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, mass effect on the brainstem with compression of the left lauren, no extension of the mass into the cavernous sinus or in the Meckel's cave, mass lies posterior to the cavernous left carotid artery, lateral to the basilar artery, and inferior to the left posterior cerebral artery  o 5/10 - s/p L retromastoid crani for resection of mass by Dr Pearl Ortega Intra-op pathology consistent with meningioma   o Admitted to ICU post-op as nsgy primary   o Continue closely monitor neuro exam   Q 1 hour neuro checks   Repeat stat CT head with any acute decline  o Plan for a m  repeat CT head  o Maintain SBP < 140    A-line in place    Consider IV prn antihypertensives if needed   o Continue decadron taper   o Continue keppra 750mg BID   o Continue pain control   o Will need formal speech evaluation   o PT/OT    Diagnosis: hx of migraine headaches   o Encouraged continued outpatient follow-up  · Diagnosis: hx of MDD  · Home meds:  Zoloft 100 mg q d    · Continue home Zoloft  · Encouraged continued outpatient follow-up as able  · Diagnosis:  Pain and sedation  · Continue to monitor off continue with IV sedation  · Continue scheduled Robaxin and Tylenol (when cleared by speech)   · P r n  Oxycodone 5 mg, 10 mg (when cleared by speech)   · Prn IV dilaudid     CV:    Diagnosis: no acute issues   o Continue to closely monitor on telemetry   o Continue to closely monitor hemodynamics     Pulm:   Diagnosis: acute respiratory insuffiency   o Extubated post-op   o Currently on 2L NC    Wean as tolerated    Maintain SaO2 > 92%   o Encourage good pulm hygiene, frequent IS    GI:    Diagnosis: no acute issues    Bowel regimen: Sen[place]ot   o Advance as needed    Renal/:    Diagnosis: metabolic, lactic acidosis   o Noted to be acidotic in OR  o Post op labs revealed compensated acidosis with persistently low bicarb    o Lactic 6 3 > 6 7   o Initiate bicarb gtt   o Follow-up repeat labs at midnight   o Trend lactic    F/E/N:    Fluids - continue bicarb gtt    Electrolytes - trend and replete as needed    Nutrition - NPO   - Will need formal speech eval     Heme/Onc:    Diagnosis: no acute issues    DVT ppx: SCDs only, hold chem ppx until cleared by nsgy     Endo:    Diagnosis: no acute issues     ID:    Diagnosis: no acute issues   o Completed surgical ppx   o Trend daily fever curve and WBC   o monitor closely for signs of infection     MSK/Skin:    Diagnosis: tongue lacerations  o No active bleeding, mild swelling   o Pain control   o Formal speech eval    Disposition: Admit to Critical Care   Code Status: Level 1 - Full Code  --------------------------------------------------------------------------------------------------------------  Review of Systems  Pt seen and examined upon arrival to the ICU  Pt complains of biju arm pain  Denies any headaches, weakness, numbness, dizziness, and vision changes  Pt somewhat groggy from the anesthesia       A 12-point, complete review of systems was reviewed and negative except as stated above     Physical Exam  Constitutional:       General: She is not in acute distress  Appearance: She is obese  She is not toxic-appearing  Comments: Alert, obese young female   Resting in bed   In no acute distress   Non-toxic appearing    HENT:      Head: Normocephalic and atraumatic  Comments: Left retromastoid crani incision noted   Clean, dry, intact      Nose: Nose normal  No congestion or rhinorrhea  Mouth/Throat:      Mouth: Mucous membranes are moist       Pharynx: Posterior oropharyngeal erythema present  Comments: 2cm tongue laceration noted to anterior portion of tongue   Mild swelling appreciated   No active bleeding   Eyes:      Extraocular Movements: Extraocular movements intact  Conjunctiva/sclera: Conjunctivae normal       Pupils: Pupils are equal, round, and reactive to light  Comments: Pupils equal, round, reactive biju   EOM intact biju    Neck:      Musculoskeletal: Normal range of motion and neck supple  Cardiovascular:      Rate and Rhythm: Normal rate and regular rhythm  Pulses: Normal pulses  Heart sounds: No murmur  Pulmonary:      Effort: Pulmonary effort is normal  No respiratory distress  Breath sounds: Normal breath sounds  No wheezing, rhonchi or rales  Comments: No respiratory distress on 2L NC   Abdominal:      General: Abdomen is flat  There is no distension  Palpations: Abdomen is soft  Tenderness: There is no abdominal tenderness  Comments: Ab soft, NT    Musculoskeletal: Normal range of motion  General: No swelling, tenderness, deformity or signs of injury  Comments: All compartments soft and non-tender   Skin:     General: Skin is warm and dry  Capillary Refill: Capillary refill takes less than 2 seconds  Neurological:      Mental Status: She is alert and oriented to person, place, and time  Cranial Nerves: Cranial nerve deficit present  Sensory: No sensory deficit  Motor: No weakness        Coordination: Coordination normal       Comments: A&O x 3  Appropriately answering questions and following commands   Mild dysarthria noted   Subtle left sided facial droop appreciated   Strength 5/5 to biju UE and biju LE   Sensation equal and intact biju    Psychiatric:         Mood and Affect: Mood normal          Behavior: Behavior normal          Thought Content: Thought content normal          Judgment: Judgment normal        --------------------------------------------------------------------------------------------------------------  Vitals:   Vitals:    05/10/21 1845 05/10/21 1900 05/10/21 1934 05/10/21 1936   BP: 124/58 128/69  129/71   Pulse: 100 96  92   Resp: 14 16  19   Temp:  99 2 °F (37 3 °C)  98 1 °F (36 7 °C)   TempSrc:    Oral   SpO2: 97% 94%  94%   Weight:   (!) 137 kg (301 lb 5 9 oz)    Height:         Temp  Min: 96 4 °F (35 8 °C)  Max: 100 1 °F (37 8 °C)  IBW (Ideal Body Weight): 54 7 kg  Height: 5' 4" (162 6 cm)  Body mass index is 51 73 kg/m²    N/A    Laboratory and Diagnostics:  Results from last 7 days   Lab Units 05/10/21  1957 05/10/21  1802 05/10/21  1746 05/10/21  1722 05/10/21  1604 05/10/21  1503 05/10/21  1408 05/10/21  1304   WBC Thousand/uL 11 45* 7 19  --   --   --   --   --   --    HEMOGLOBIN g/dL 13 1 12 8  --   --   --   --   --   --    I STAT HEMOGLOBIN g/dl  --   --   --  11 2* 12 2 12 2 11 9 13 6   HEMATOCRIT % 39 6 37 4  --   --   --   --   --   --    HEMATOCRIT, ISTAT %  --   --   --  33* 36 36 35 40   PLATELETS Thousands/uL 188 179  --   --   --   --   --   --    MONO PCT %  --   --  1*  --   --   --   --   --      Results from last 7 days   Lab Units 05/10/21  1722 05/10/21  1640 05/10/21  1604 05/10/21  1503 05/10/21  1408 05/10/21  1304 05/10/21  1058   SODIUM mmol/L  --  143  --   --   --   --   --    POTASSIUM mmol/L  --  4 3  --   --   --   --   --    CHLORIDE mmol/L  --  113*  --   --   --   --   --    CO2 mmol/L  --  15*  --   --   --   --   --    CO2, I-STAT mmol/L 20*  --  17* 17* 19* 20* 23   ANION GAP mmol/L  -- 15*  --   --   --   --   --    BUN mg/dL  --  5  --   --   --   --   --    CREATININE mg/dL  --  0 94  --   --   --   --   --    CALCIUM mg/dL  --  8 7  --   --   --   --   --    GLUCOSE RANDOM mg/dL  --  175*  --   --   --   --   --      Results from last 7 days   Lab Units 05/10/21  1640   MAGNESIUM mg/dL 1 7               Results from last 7 days   Lab Units 05/10/21  1853 05/10/21  1653   LACTIC ACID mmol/L 6 7* 6 3*     ABG:  Results from last 7 days   Lab Units 05/10/21  1958   [facility] ART  7 348*   PCO2 ART mm Hg 32 4*   PO2 ART mm Hg 81 1   HCO3 ART mmol/L 17 4*   BASE EXC ART mmol/L -7 1   ABG SOURCE  Line, Arterial     VBG:  Results from last 7 days   Lab Units 05/10/21  1958   ABG SOURCE  Line, Arterial           Micro:      Imaging: I have personally reviewed pertinent reports        Historical Information   Past Medical History:   Diagnosis Date    Always tired     Anesthesia     "woke up during one ECT treatment also during wisdom teeth"    Balance problems     Bilateral numbness and tingling of arms and legs     "most of the time"    Chest pain      a few months ago went to ER [facility]," had testing and told everything ok"    Chronic pain disorder     Depressive disorder     Dizziness     "on way to work and felt like going to pass out, to ER and had CT SCAN/MRI brain; found brain mass"    Family history of bleeding disorder     "pt reports Mom has clotting problem"    GERD (gastroesophageal reflux disease)     H1N1 influenza with pneumonia 2011    Headache     Joint pain     neck and shoulders    Migraine     occas    Muscle weakness     Nausea & vomiting     occas    Risk for falls     Shortness of breath     "on exertion feels related to weight gain"    Swollen feet     Tinnitus     Wears glasses     and contacts; will wear glasses DOS    Word finding difficulty     "feels words dont come quickly and also forgetful"     Past Surgical History:   Procedure Laterality Date    COLONOSCOPY      ELECTROCONVULSIVE THERAPY      19 treatments    HYSTERECTOMY      WISDOM TOOTH EXTRACTION       Social History   Social History     Substance and Sexual Activity   Alcohol Use Yes    Frequency: 2-4 times a month    Drinks per session: 1 or 2    Comment: "has cut back"     Social History     Substance and Sexual Activity   Drug Use Not Currently    Types: Marijuana    Comment: "hasnt used in several weeks"     Social History     Tobacco Use   Smoking Status Current Every Day Smoker    Packs/day: 0 33    Types: Cigarettes   Smokeless Tobacco Never Used   Tobacco Comment    has quit in the past will try "cold turkey"     Family History:   Family History   Problem Relation Age of Onset    Bleeding Disorder Mother     Fibromyalgia Mother     Cervical cancer Mother     Alcohol abuse Father     Depression Father     Stroke Father     Hyperlipidemia Brother     Heart failure Paternal Grandmother     Hyperlipidemia Paternal Grandmother      I have reviewed this patient's family history and commented on sigificant items within the HPI      Medications:  Current Facility-Administered Medications   Medication Dose Route Frequency    acetaminophen (TYLENOL) tablet 975 mg  975 mg Oral Q8H [address]    calcium carbonate (TUMS) chewable tablet 1,000 mg  1,000 mg Oral Daily PRN    [START ON 5/11/2021] ceFAZolin (ANCEF) IVPB (premix in dextrose) 1,000 mg 50 mL  1,000 mg Intravenous Q8H    dexamethasone (DECADRON) injection 4 mg  4 mg Intravenous Q6H [address]    Followed by    [facility] ON 5/13/2021] dexamethasone (DECADRON) injection 4 mg  4 mg Intravenous Q8H    Followed by    [facility] ON 5/16/2021] dexamethasone (DECADRON) injection 2 mg  2 mg Intravenous Q6H [address]    Followed by   [facility] ON 5/19/2021] dexamethasone (DECADRON) injection 2 mg  2 mg Intravenous Q8H    Followed by    [facility] ON 5/22/2021] dexamethasone (DECADRON) injection 2 mg  2 mg Intravenous Q12H [address]    Followed by    [facility] ON 5/25/2021] dexamethasone (DECADRON) injection 2 mg  2 mg Intravenous Q24H    [START ON 5/11/2021] famotidine (PEPCID) tablet 40 mg  40 mg Oral Daily    HYDROmorphone (DILAUDID) injection 0 5 mg  0 5 mg Intravenous Q3H PRN    levETIRAcetam (KEPPRA) 750 mg in sodium chloride 0 9 % 100 mL IVPB  750 mg Intravenous Q12H [address]    meclizine (ANTIVERT) tablet 25 mg  25 mg Oral TID PRN    methocarbamol (ROBAXIN) tablet 750 mg  750 mg Oral Q6H [address]    ondansetron (ZOFRAN) injection 4 mg  4 mg Intravenous Q6H PRN    oxyCODONE (ROXICODONE) immediate release tablet 10 mg  10 mg Oral Q4H PRN    oxyCODONE (ROXICODONE) IR tablet 5 mg  5 mg Oral Q4H PRN    [START ON 5/11/2021] polyethylene glycol (MIRALAX) packet 17 g  17 g Oral Daily    [START ON 5/11/2021] senna-docusate sodium (SENOKOT S) 8 6-50 mg per tablet 2 tablet  2 tablet Oral Daily    [START ON 5/11/2021] sertraline (ZOLOFT) tablet 100 mg  100 mg Oral Daily    sodium chloride 0 9 % infusion  125 mL/hr Intravenous Continuous     Home medications:  Prior to Admission Medications   Prescriptions Last Dose Informant Patient Reported? Taking?    Biotin w/ Vitamins C & E (HAIR/SKIN/NAILS PO) 5/5/2021  Yes Yes   Sig: Take by mouth   COVID-19 mRNA Virus Vaccine (MODERNA COVID-19 VACCINE IM)   Yes Yes   Sig: Inject into a muscle 1st dose received 4/21/21 2nd dose sched for 5/18/21   Coenzyme Q10 (COQ-10 PO) 5/5/2021  Yes Yes   Sig: Take by mouth daily   acetaminophen (TYLENOL) 500 mg tablet 5/9/2021 at Unknown time  Yes Yes   Sig: Take 1,000 mg by mouth every 6 (six) hours as needed for mild pain   azelastine (OPTIVAR) 0 05 % ophthalmic solution Past Week Self Yes Yes   Sig: Apply 1 drop to eye as needed   famotidine (PEPCID) 40 MG tablet 5/10/2021 at 0430 Self Yes Yes   Sig: Take 40 mg by mouth daily   meclizine (ANTIVERT) 25 mg tablet Past Week at Unknown time Self No Yes   Sig: Take 1 tablet (25 mg total) by mouth 3 (three) times a day as needed for dizziness for up to 15 doses ondansetron (ZOFRAN-ODT) 4 mg disintegrating tablet Past Week Self No Yes   Sig: Take 1 tablet (4 mg total) by mouth every 6 (six) hours as needed for nausea or vomiting for up to 20 doses   patient supplied medication Past Week Self Yes Yes   Sig: CBD gummies as needed   sertraline (ZOLOFT) 50 mg tablet 5/10/2021 at 0430 Self Yes Yes   Si mg daily       Facility-Administered Medications: None     Allergies:  No Known Allergies  ------------------------------------------------------------------------------------------------------------  Advance Directive and Living Will:      Power of :    POLST:    ------------------------------------------------------------------------------------------------------------  Anticipated Length of Stay is > 2 midnights    Care Time Delivered:   No Critical Care time spent       Lewis Morris PA-C        Portions of the record may have been created with voice recognition software  Occasional wrong word or "sound a like" substitutions may have occurred due to the inherent limitations of voice recognition software    Read the chart carefully and recognize, using context, where substitutions have occurred

## 2021-05-11 NOTE — SPEECH THERAPY NOTE
Speech Language/Pathology  Speech-Language Pathology Bedside Swallow Evaluation      Patient Name: Pema Peña    HAVVM'R Date: 5/11/2021     Problem List  Principal Problem:    Cerebellopontine angle tumor ([address] )  Active Problems:    Depression    Gastroesophageal reflux disease    Obesity      Past Medical History  Past Medical History:   Diagnosis Date    Always tired     Anesthesia     "woke up during one ECT treatment also during wisdom teeth"    Balance problems     Bilateral numbness and tingling of arms and legs     "most of the time"    Chest pain      a few months ago went to ER [facility]," had testing and told everything ok"    Chronic pain disorder     Depressive disorder     Dizziness     "on way to work and felt like going to pass out, to ER and had CT SCAN/MRI brain; found brain mass"    Family history of bleeding disorder     "pt reports Mom has clotting problem"    GERD (gastroesophageal reflux disease)     H1N1 influenza with pneumonia 2011    Headache     Joint pain     neck and shoulders    Migraine     occas    Muscle weakness     Nausea & vomiting     occas    Risk for falls     Shortness of breath     "on exertion feels related to weight gain"    Swollen feet     Tinnitus     Wears glasses     and contacts; will wear glasses DOS    Word finding difficulty     "feels words dont come quickly and also forgetful"       Past Surgical History  Past Surgical History:   Procedure Laterality Date    COLONOSCOPY      ELECTROCONVULSIVE THERAPY      19 treatments    HYSTERECTOMY      WISDOM TOOTH EXTRACTION         Summary   Pt presented w/  WFL and minimal oral and suspected WFL and minimal pharyngeal dysphagia  Noted limited trials given an episode of large amt of projectile vomiting followed by more vomiting       Risk/s for Aspiration: -    Recommended Diet: NPO   Recommended Form of Meds: non-oral if possible   Aspiration precautions and swallowing strategies: upright posture, small bites/sips and alternating bites and sips  Other Recommendations: Continue frequent oral care, st to follow for ongoing trials         Current Medical Status  Pt is a 41 y o  female who presented to [facility] initially presented to the ED 4/12/2021 with complaint of dizziness  The onset of symptoms started 1 month ago, worse over the past 4 days prior to that day  Noted mass on imaging, scheduled for outpt f/u  Pt returned 5/10/2021 for craniotomy for 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  She is extubated today  Current Precautions:  Fall  Aspiration      Allergies:  No known food allergies    Past medical history:  Please see H&P for details    Special Studies:  CT head w/o, 4/12/21: 2 3cm partially calcified mass left CP angle with some mass effect  MRI brain w/wo, 4/12/21: same as CT head  Mass effect with compression of left lauren, no extension into cavernous sinus or meckel's cave  No edema appreciated on FLAIR images  Social/Education/Vocational Hx:  Pt lives with family    Swallow Information   Current Risks for Dysphagia & Aspiration: dysarthria and recent surgery  Current Symptoms/Concerns: change in vocal quality post intubation, lethargy  Current Diet: NPO   Baseline Diet: regular diet and thin liquids      Baseline Assessment   Behavior/Cognition: alert and lethargic  Speech/Language Status: able to participate in basic conversation  Patient Positioning: upright in bed  Pain Status/Interventions/Response to Interventions:   No report of or nonverbal indications of pain         Swallow Mechanism Exam  Facial: left facial droop  Labial: bilateral decreased ROM and decreased strength  Lingual: decreased ROM, bilateral decreased strength and decreased coordination  Velum: unable to visualize  Mandible:  decreased ROM  Dentition: adequate  Vocal quality:breathy, weak and hoarse   Volitional Cough: weak   Respiratory Status: on facemask, took off for the assessment Consistencies Assessed and Performance   Consistencies Administered: ice chips, thin liquids and nectar thick, puree  Liquids were offered by cup, straw  Did not get to liquids- pt w/ projectile vomiting & assessment stopped  Oral Stage: WFL with limited trials   Adequate retrieval with the tsp/cup/straw w/o any loss on the L noted  Slow transfers, no immed oral residue  Pharyngeal Stage: WFL with limited trials  Swallow Mechanics:  Swallowing initiation appeared prompt  Laryngeal rise was palpated and judged to be within functional limits to fair  No coughing, throat clearing, change in vocal quality or respiratory status noted today  Projectile vomiting noted and trials were ceased  Esophageal Concerns: none reported    Strategies and Efficacy: small sips attempted    Summary and Recommendations (see above)    Results Reviewed with: patient, RN and MD     Treatment Recommended: yes     Frequency of treatment: as able  Patient Stated Goal:    Dysphagia LTG  -Patient will demonstrate safe and effective oral intake (without overt s/s significant oral/pharyngeal dysphagia including s/s penetration or aspiration) for the highest appropriate diet level       Speech Therapy Prognosis   Prognosis: good    Prognosis Considerations: age, medical status and cognitive status

## 2021-05-11 NOTE — PROGRESS NOTES
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  Today is post-op day 1  Of note, patient developed lactic acidosis intraoperatively which has since been downtrending and ovn, has had a few desaturation episodes requiring 4-5L of NC   ---------------------------------------------------------------------------------------  SUBJECTIVE  Patient seen at bedside  She complains about left-sided headache and right shoulder pain for which she received pain medication  She is unable to receive PO at this time because of dysarthria requiring speech eval  She is very cooperative and follows commands  Review of Systems   Constitutional: Negative for chills, fatigue and fever  HENT: Negative for congestion and sore throat  Eyes: Positive for visual disturbance (Double vision)  Respiratory: Negative for cough, shortness of breath and wheezing  Cardiovascular: Negative for chest pain, palpitations and leg swelling  Gastrointestinal: Positive for nausea  Negative for abdominal pain, diarrhea and vomiting  Musculoskeletal: Negative for arthralgias  Skin: Negative for rash  Neurological: Positive for numbness (on left fingers) and headaches (Left-sided)  Negative for dizziness and facial asymmetry  Invasive lines and devices:   Invasive Devices     Peripheral Intravenous Line            Peripheral IV 05/10/21 Left Arm 1 day    Peripheral IV 05/10/21 Right Wrist 1 day          Arterial Line            Arterial Line 05/10/21 Right Radial 1 day          Drain            Urethral Catheter Latex 16 Fr  1 day ---------------------------------------------------------------------------------------  Assessment and Plan:    Neuro:    Diagnosis: Cerebellopontine mass now s/p resection   4/12 CT scan:2 3 cm partially calcified mass in the left cerebellopontine angle with regional mass effect  Recommend MRI for further evaluation   4/12 MRI:  2 3 x 2 2 x 2 5 cm enhancing extra-axial mass in the left petroclival region 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  There is mass effect on the brainstem with compression of the left lauren  No extension of the mass into the cavernous sinus or in the Meckel's cave  Mass lies posterior to the cavernous left carotid artery, lateral to the basilar artery, and inferior to the left posterior cerebral artery  No additional mass seen   POD 1 left 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   Postop CT head 5/11: There is hemorrhage within the left lateral prepontine cistern corresponding to the site of the previous mass  Small amount of air and fluid is present  Patrick [place] appears to be a small   parenchymal hemorrhage within the adjacent left lateral lauren, measuring approximately 1 1 cm in maximum oblique dimension  Small amount of subarachnoid hemorrhage is seen in the right parafalcine region posteriorly and extending along the superior tentorium   NSG is primary team    Analgesia: Continue current pain regimen   Seizure ppx: Keppra 750mg bid   Continue decadron 72hr taper   Speech eval this am given dysarthria - rec keep NPO for now   MRI ordered - follow up   Delirium ppx: CAM-ICU, sleep hygiene   Cont home med zoloft 50mg      CV:   · Diagnosis: Bradycardia  ? Continue to closely monitor on telemetry   ? EKG - sinus kayley  ? Continue to closely monitor hemodynamics   ? SBP goal 120-140  ?  Hydralazine 10mg q4h prn added to regimen   MAP goal > 65   Hyperlipidemia: lipid panel done 5/3/21 showed , HDL 52, chol 277 and TG 80   Consider starting statin      Pulm:  · Diagnosis: acute respiratory insuffiency   · CXR 5/11: Patchy bilateral airspace opacities suspicious for pulmonary edema versus multifocal infection  ? Extubated post-op   ? Desaturation episodes  ? Currently on 6L NC   § Wean as tolerated   § Maintain SaO2 > 92%   ? Encourage good pulm hygiene  ? Frequent Incentive spirometry     GI:   · Diagnosis: Transaminitis possibly due to GOMEZ   · CT abd 2020: mild hepatomegaly  · Patient will need outpatient followup following discharge  Lab Units 05/11/21  0451 05/11/21  0002 05/10/21  1958   AST U/L 166* 192* 206*   ALT U/L 202* 236* 257*     · Nausea: prn Zofran IV ordered  · Bowel regimen: Senakot and MiraLax   ? Advance as needed     Renal/:   · Diagnosis: Lactic acidosis   ? Noted to be acidotic perioperatively  ? Post op labs revealed compensated acidosis with persistently low bicarb  ? S/p bicarb gtt  ? Continue Isolyte at 125cc/hr  ? Lactic 6 3 > 6 7 > 3 1 > 2 6 > 2 5  ? Can stop trending  · Continue Turcios catheter in-situ  · Cont strict I&O monitoring     F/E/N:   · Fluids - continue IVF   · Electrolytes - trend and replete as needed   · Nutrition - NPO   § Will need formal speech eval      Heme/Onc:   · Diagnosis: no acute issues   · Mild Leukocytosis 14 44 today bump from 11 45 2/2 stress response ff surgery vs steroid therapy  · Cont to monitor CBC  · Hb - 12 6  · DVT ppx: SCDs only  · hold chem ppx for now     Endo:   · Diagnosis: no acute issues  · Maintain -180      ID:   · Diagnosis: no acute issues   ? Completed surgical ppx with Ancef  ? Trend daily fever curve and WBC   ? Reactive leukocytosis for now, monitor  ? monitor closely for signs of infection       MSK/Skin:   · Diagnosis: tongue lacerations, right shoulder pain  ? No active bleeding   ? Pain control on board  ?  Follow up Formal speech eval      Disposition: Continue Critical Care   Code Status: Level 1 - Full Code  Diet: Diet NPO  ---------------------------------------------------------------------------------------  OBJECTIVE    Vitals   /71   Pulse (!) 54   Temp 98 1 °F (36 7 °C) (Oral)   Resp (!) 26   Ht 5' 4" (1 626 m)   Wt (!) 137 kg (301 lb 5 9 oz)   SpO2 92%   BMI 51 73 kg/m²     Temp (24hrs), Av 9 °F (37 2 °C), Min:98 1 °F (36 7 °C), Max:100 1 °F (37 8 °C)    Vitals:    21 0600 21 0700 21 0800 21 0900   BP:       Pulse: (!) 46 (!) 48 (!) 46 (!) 54   Resp: 17 18 17 (!) 26   Temp:       TempSrc:       SpO2: 92% 92% 93% 92%   Weight:       Height:           Respiratory:  SpO2: SpO2: 92 %SpO2 Device: O2 Device: Simple mask  Nasal Cannula O2 Flow Rate (L/min): 6 L/min    Intake and Output  I/O        0701 - 05/10 0700 05/10 0701 -  0700    I V  (mL/kg)  8103 8 (59 2)    IV Piggyback  1350    Total Intake(mL/kg)  9453 8 (69)    Urine (mL/kg/hr)  8710 (2 6)    Blood  250    Total Output  8960    Net  +493 8                Physical Exam  Vitals signs reviewed  Constitutional:       General: She is not in acute distress  Appearance: Normal appearance  She is morbidly obese  She is not diaphoretic  Interventions: Face mask in place  HENT:      Head: Normocephalic and atraumatic  Right Ear: Hearing and external ear normal       Left Ear: Hearing and external ear normal       Mouth/Throat:      Mouth: Mucous membranes are moist       Tongue: Lesions present  Eyes:      General: No visual field deficit or scleral icterus  Extraocular Movements: Extraocular movements intact  Conjunctiva/sclera: Conjunctivae normal    Cardiovascular:      Rate and Rhythm: Regular rhythm  Bradycardia present  Heart sounds: Normal heart sounds  No murmur  Pulmonary:      Effort: Pulmonary effort is normal  No respiratory distress  Breath sounds: Normal breath sounds  No decreased air movement  No decreased breath sounds or rales  Chest:      Chest wall: No tenderness  Abdominal:      General: Bowel sounds are normal       Palpations: Abdomen is soft  Tenderness: There is no abdominal tenderness  Musculoskeletal:         General: No swelling, tenderness, deformity or signs of injury  Right lower leg: No edema  Left lower leg: No edema  Skin:     General: Skin is warm  Coloration: Skin is not pale  Findings: No erythema or rash  Neurological:      General: No focal deficit present  Mental Status: She is alert and oriented to person, place, and time  GCS: GCS eye subscore is 4  GCS verbal subscore is 5  GCS motor subscore is 6  Cranial Nerves: Dysarthria present  No facial asymmetry  Sensory: Sensation is intact  No sensory deficit  Motor: No weakness, tremor, atrophy, abnormal muscle tone or seizure activity  Psychiatric:         Attention and Perception: Attention normal          Speech: Speech is slurred  Behavior: Behavior normal  Behavior is cooperative           Cognition and Memory: Cognition normal          Laboratory and Diagnostics:  Results from last 7 days   Lab Units 05/11/21  0450 05/10/21  1957 05/10/21  1802 05/10/21  1746 05/10/21  1722 05/10/21  1604 05/10/21  1503 05/10/21  1408   WBC Thousand/uL 14 44* 11 45* 7 19  --   --   --   --   --    HEMOGLOBIN g/dL 12 6 13 1 12 8  --   --   --   --   --    I STAT HEMOGLOBIN g/dl  --   --   --   --  11 2* 12 2 12 2 11 9   HEMATOCRIT % 36 7 39 6 37 4  --   --   --   --   --    HEMATOCRIT, ISTAT %  --   --   --   --  33* 36 36 35   PLATELETS Thousands/uL 181 188 179  --   --   --   --   --    NEUTROS PCT %  --  90*  --   --   --   --   --   --    MONOS PCT %  --  4  --   --   --   --   --   --    MONO PCT %  --   --   --  1*  --   --   --   --      Results from last 7 days   Lab Units 05/11/21 0451 05/11/21  0002 05/10/21  1958 05/10/21  1722 05/10/21  1640 05/10/21  1604 05/10/21  1503   SODIUM mmol/L 142 141 142  --  143  --   --    POTASSIUM mmol/L 3 8 4 0 4 5  --  4 3  --   --    CHLORIDE mmol/L 111* 110* 113*  --  113*  --   --    CO2 mmol/L 24 23 17*  --  15*  --   --    CO2, I-STAT mmol/L  --   --   --  20*  --  17* 17*   ANION GAP mmol/L 7 8 12  --  15*  --   --    BUN mg/dL 5 5 4*  --  5  --   --    CREATININE mg/dL 0 70 0 74 0 98  --  0 94  --   --    CALCIUM mg/dL 8 9 9 2 9 0  --  8 7  --   --    GLUCOSE RANDOM mg/dL 138 154* 125  --  175*  --   --    ALT U/L 202* 236* 257*  --   --   --   --    AST U/L 166* 192* 206*  --   --   --   --    ALK PHOS U/L 57 58 60  --   --   --   --    ALBUMIN g/dL 3 5 3 7 3 7  --   --   --   --    TOTAL BILIRUBIN mg/dL 0 43 0 40 0 29  --   --   --   --      Results from last 7 days   Lab Units 05/11/21  0451 05/10/21  1958 05/10/21  1640   MAGNESIUM mg/dL 2 0 1 9 1 7   PHOSPHORUS mg/dL 3 4 2 7  --       Results from last 7 days   Lab Units 05/11/21  0452 05/10/21  1958   INR  1 08 1 05   PTT seconds 26  --               Results from last 7 days   Lab Units 05/11/21  0451 05/11/21  0002 05/10/21  2106 05/10/21  1853 05/10/21  1653   LACTIC ACID mmol/L 2 5* 2 6* 3 1* 6 7* 6 3*     ABG:  Results from last 7 days   Lab Units 05/11/21  0536 05/11/21  0001   PH ART  7 419 7 413   PCO2 ART mm Hg 40 6 33 5*   PO2 ART mm Hg 58 2* 58 7*   HCO3 ART mmol/L 25 7 20 9*   BASE EXC ART mmol/L 1 1 -2 9   ABG SOURCE   --  Line, Arterial       Micro  COVID negative    Imaging:   XR chest portable ICU   Final Result by Nancy Tavarez MD (05/11 0930)      Patchy bilateral airspace opacities suspicious for pulmonary edema versus multifocal infection  Workstation performed: [id number]         CT head wo contrast   Final Result by Bertram Rios DO (05/11 9606)      Status post left retromastoid craniotomy for mass resection  There is hemorrhage within the left lateral prepontine cistern corresponding to the site of the previous mass  Small amount of air and fluid is present    There appears to be a small    parenchymal hemorrhage within the adjacent left lateral lauren, measuring approximately 1 1 cm in maximum oblique dimension  Small amount of subarachnoid hemorrhage is seen in the right parafalcine region posteriorly and extending along the superior tentorium  This examination was marked "immediate notification" in Epic in order to begin the standard process by which the radiology reading room liaison alerts the referring practitioner  Workstation performed: [id number]         MRI inpatient order    (Results Pending)    I have personally reviewed pertinent reports  Height and Weights   Height: 5' 4" (162 6 cm)  IBW (Ideal Body Weight): 54 7 kg  Body mass index is 51 73 kg/m²    Weight (last 2 days)     Date/Time   Weight    05/10/21 1934   (!) 137 (301 37)    05/10/21 0601   132 (290)              Active Medications  Scheduled Meds:  Current Facility-Administered Medications   Medication Dose Route Frequency Provider Last Rate    acetaminophen  975 mg Oral Q8H [address] Sujey Biundo, PA-C      calcium carbonate  1,000 mg Oral Daily PRN Sujey Biundo, PA-C      dexamethasone  4 mg Intravenous Q6H [address] Sujey Biundo, PA-C      Followed by   Leeanne Stare ON 5/13/2021] dexamethasone  4 mg Intravenous Q8H Sujey Biundo, PA-C      Followed by   Leeanne Stare ON 5/16/2021] dexamethasone  2 mg Intravenous Q6H [address] Sujey Biundo, PA-C      Followed by   Leeanne Stare ON 5/19/2021] dexamethasone  2 mg Intravenous Q8H Sujey Biundo, PA-C      Followed by   Leeanne Stare ON 5/22/2021] dexamethasone  2 mg Intravenous Q12H [address] Sujey Biundo, PA-C      Followed by   Leeanne Stare ON 5/25/2021] dexamethasone  2 mg Intravenous Q24H Sujey Biundo, PA-C      famotidine  40 mg Oral Daily Sujey Biundo, PA-C      HYDROmorphone  0 5 mg Intravenous Q3H PRN LAMONTE Lazar-C      levETIRAcetam  750 mg Intravenous Q12H [address] Sujey Biundo, PA-C Stopped (05/10/21 9345)    meclizine  25 mg Oral TID PRN Sujey Kevin PA-C      methocarbamol  750 mg Oral Q6H [address] Sujey BERYL Kevin      multi-electrolyte  125 mL/hr Intravenous Continuous Monika Maloney PA-C      ondansetron  4 mg Intravenous Q6H PRN Amelia Mcdowell PA-C      oxyCODONE  10 mg Oral Q4H PRN Sujey BiBERYL garcia      oxyCODONE  5 mg Oral Q4H PRN Sujey BERYL Kevin      polyethylene glycol  17 g Oral Daily Sujeysatish Kevin PA-C      potassium chloride  20 mEq Intravenous Once Roberto Carlos Iraheta MD 20 mEq (05/11/21 0984)    senna-docusate sodium  2 tablet Oral Daily Sujey BERYL Kevin      sertraline  100 mg Oral Daily Sujeykhai Kevin PA-C       Continuous Infusions:  multi-electrolyte, 125 mL/hr      PRN Meds:   calcium carbonate, 1,000 mg, Daily PRN  HYDROmorphone, 0 5 mg, Q3H PRN  meclizine, 25 mg, TID PRN  ondansetron, 4 mg, Q6H PRN  oxyCODONE, 10 mg, Q4H PRN  oxyCODONE, 5 mg, Q4H PRN      Allergies   No Known Allergies  ---------------------------------------------------------------------------------------  ICU CORE MEASURES    Prophylaxis   VTE Pharmacologic Prophylaxis: Not Indicated at this time  VTE Mechanical Prophylaxis: sequential compression device  Stress Ulcer Prophylaxis: Prophylaxis Not Indicated   ---------------------------------------------------------------------------------------  Advance Directive and Living Will:      Power of :    POLST:    ---------------------------------------------------------------------------------------      Roberto Carlos Iraheta MD  PGY-1 [facility]  05/11/21

## 2021-05-12 LAB
ANION GAP SERPL CALCULATED.3IONS-SCNC: 7 MMOL/L (ref 4–13)
BUN SERPL-MCNC: 14 MG/DL (ref 5–25)
CALCIUM SERPL-MCNC: 8.9 MG/DL (ref 8.3–10.1)
CHLORIDE SERPL-SCNC: 109 MMOL/L (ref 100–108)
CO2 SERPL-SCNC: 26 MMOL/L (ref 21–32)
CREAT SERPL-MCNC: 0.66 MG/DL (ref 0.6–1.3)
GFR SERPL CREATININE-BSD FRML MDRD: 109 ML/MIN/1.73SQ M
GLUCOSE SERPL-MCNC: 112 MG/DL (ref 65–140)
MAGNESIUM SERPL-MCNC: 2.5 MG/DL (ref 1.6–2.6)
PHOSPHATE SERPL-MCNC: 3.8 MG/DL (ref 2.7–4.5)
POTASSIUM SERPL-SCNC: 4.2 MMOL/L (ref 3.5–5.3)
SODIUM SERPL-SCNC: 142 MMOL/L (ref 136–145)

## 2021-05-12 PROCEDURE — 80048 BASIC METABOLIC PNL TOTAL CA: CPT | Performed by: FAMILY MEDICINE

## 2021-05-12 PROCEDURE — 84100 ASSAY OF PHOSPHORUS: CPT | Performed by: FAMILY MEDICINE

## 2021-05-12 PROCEDURE — 99024 POSTOP FOLLOW-UP VISIT: CPT | Performed by: NURSE PRACTITIONER

## 2021-05-12 PROCEDURE — 83735 ASSAY OF MAGNESIUM: CPT | Performed by: FAMILY MEDICINE

## 2021-05-12 PROCEDURE — 99291 CRITICAL CARE FIRST HOUR: CPT | Performed by: EMERGENCY MEDICINE

## 2021-05-12 RX ORDER — FUROSEMIDE 10 MG/ML
20 INJECTION INTRAMUSCULAR; INTRAVENOUS ONCE
Status: COMPLETED | OUTPATIENT
Start: 2021-05-12 | End: 2021-05-12

## 2021-05-12 RX ADMIN — OXYCODONE HYDROCHLORIDE 10 MG: 10 TABLET ORAL at 05:08

## 2021-05-12 RX ADMIN — ACETAMINOPHEN 975 MG: 325 TABLET, FILM COATED ORAL at 05:08

## 2021-05-12 RX ADMIN — LEVETIRACETAM 750 MG: 100 INJECTION, SOLUTION INTRAVENOUS at 20:32

## 2021-05-12 RX ADMIN — DEXAMETHASONE SODIUM PHOSPHATE 4 MG: 4 INJECTION INTRA-ARTICULAR; INTRALESIONAL; INTRAMUSCULAR; INTRAVENOUS; SOFT TISSUE at 17:01

## 2021-05-12 RX ADMIN — FUROSEMIDE 20 MG: 10 INJECTION, SOLUTION INTRAMUSCULAR; INTRAVENOUS at 09:05

## 2021-05-12 RX ADMIN — SERTRALINE HYDROCHLORIDE 100 MG: 100 TABLET ORAL at 08:34

## 2021-05-12 RX ADMIN — DEXAMETHASONE SODIUM PHOSPHATE 4 MG: 4 INJECTION INTRA-ARTICULAR; INTRALESIONAL; INTRAMUSCULAR; INTRAVENOUS; SOFT TISSUE at 05:08

## 2021-05-12 RX ADMIN — DEXAMETHASONE SODIUM PHOSPHATE 4 MG: 4 INJECTION INTRA-ARTICULAR; INTRALESIONAL; INTRAMUSCULAR; INTRAVENOUS; SOFT TISSUE at 23:39

## 2021-05-12 RX ADMIN — METHOCARBAMOL TABLETS 750 MG: 750 TABLET, COATED ORAL at 23:39

## 2021-05-12 RX ADMIN — DEXAMETHASONE SODIUM PHOSPHATE 4 MG: 4 INJECTION INTRA-ARTICULAR; INTRALESIONAL; INTRAMUSCULAR; INTRAVENOUS; SOFT TISSUE at 11:08

## 2021-05-12 RX ADMIN — METHOCARBAMOL TABLETS 750 MG: 750 TABLET, COATED ORAL at 17:17

## 2021-05-12 RX ADMIN — OXYCODONE HYDROCHLORIDE 10 MG: 10 TABLET ORAL at 23:44

## 2021-05-12 RX ADMIN — METHOCARBAMOL TABLETS 750 MG: 750 TABLET, COATED ORAL at 05:08

## 2021-05-12 RX ADMIN — OXYCODONE HYDROCHLORIDE 10 MG: 10 TABLET ORAL at 16:09

## 2021-05-12 RX ADMIN — ACETAMINOPHEN 975 MG: 325 TABLET, FILM COATED ORAL at 22:02

## 2021-05-12 RX ADMIN — METHOCARBAMOL TABLETS 750 MG: 750 TABLET, COATED ORAL at 11:08

## 2021-05-12 RX ADMIN — ACETAMINOPHEN 975 MG: 325 TABLET, FILM COATED ORAL at 13:19

## 2021-05-12 RX ADMIN — DOCUSATE SODIUM AND SENNOSIDES 2 TABLET: 8.6; 5 TABLET ORAL at 08:27

## 2021-05-12 RX ADMIN — Medication 1 TABLET: at 09:05

## 2021-05-12 RX ADMIN — LEVETIRACETAM 750 MG: 100 INJECTION, SOLUTION INTRAVENOUS at 08:26

## 2021-05-12 RX ADMIN — OXYCODONE HYDROCHLORIDE 5 MG: 5 TABLET ORAL at 09:11

## 2021-05-12 RX ADMIN — POLYETHYLENE GLYCOL 3350 17 G: 17 POWDER, FOR SOLUTION ORAL at 08:27

## 2021-05-12 RX ADMIN — FAMOTIDINE 40 MG: 20 TABLET ORAL at 08:26

## 2021-05-12 NOTE — PROGRESS NOTES
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[facility])  Assessment & Plan  POD 2 left 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 (Left)  · Pre op complains of pain behind her left eye, headaches, and occasionally diplopia  She also had numbness and tingling bilaterally in her arms, RUE weakness  and developed some lightheadedness with balance difficulties  · Pathology pending    Imaging:    CT head wo 5/11/21:Status post left retromastoid craniotomy for mass resection  There is hemorrhage within the left lateral prepontine cistern corresponding to the site of the previous mass  Small amount of air and fluid is present  There appears to be a small parenchymal hemorrhage within the adjacent left lateral lauren, measuring approximately 1 1 cm in maximum oblique dimension  Small amount of subarachnoid hemorrhage is seen in the right parafalcine region posteriorly and extending along the superior tentorium  MRI brain w/wo 5/11/21: 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  There is residual tumor identified as seen on series 10 image 7 and series 1100, image 25  There is edema and hemorrhage within the left lateral aspect of the lauren as well as a smaller focus of hemorrhage within the posterior central aspect of the brain  Mild associated edema  There is a small amount of acute subdural blood seen posteriorly within both hemispheres measuring approximately 2 mm in thickness  There is mild reactive dural thickening and enhancement seen diffusely  Plan:  · Continue frequent neurological checks     · Reviewed imaging with patient   · Ordered CT head for tomorrow morning to revaluate hemorrhages   · STAT CT head with any neurological decline including drop GCS of 2pts within 1 hr   · Recommend SBP <160mmHg  · Keppra 750mg Q 12H for seziure ppx   · Continue 72 hour Decadron taper  · Medical management and pain control per primary team  · Mobilize with PT/OT  · Speech eval, cleared patient for full liquids and thins  · DVT PPX: SCDs only at this time  Would recommend additional CT head for stability prior to initiation of pharmacological DVT PPX  Neurosurgery will continue to follow closely, patient needs to remain in ICU, call with any questions or concerns  Lactic acidosis  Assessment & Plan  Patient developed this postop  · Lactic acid trending down from 6 7 to 1 7  · IVFs DC'd  · Continue to monitor and trend  · Management per primary team      Subjective/Objective      Chief Complaint: "I feel better today"    Subjective: Patient currently complaining of 5/10 HA and incisional pain, reports it is aching in nature  She reports her current pain regimen helps with her pain  She continues to endorse double vision  She also states she becomes dizzy when getting up  Her tingling in L thumb, pointer, and middle finger have improved  She denies any chest pain, SOB, abdominal pain, N, V, D, no problems with bowel or bladder, no new weakness or N/T  Patient has hart cath in place  Patient remains on simple mask  Per chart review patient did received 1 dose of hydralazine overnight  Objective: Patient comfortably laying in bed, NAD  I/O       05/10 0701 - 05/11 0700 05/11 0701 - 05/12 0700 05/12 0701 - 05/13 0700    P  O   340     I V  (mL/kg) 8103 8 (59 2)      IV Piggyback 1350 400     Total Intake(mL/kg) 9453 8 (69) 740 (5 4)     Urine (mL/kg/hr) 8810 (2 7) 580 (0 2) 100 (0 3)    Blood 250      Total Output 9060 580 100    Net +393 8 +160 -100                 Invasive Devices     Peripheral Intravenous Line            Peripheral IV 05/10/21 Right Wrist 2 days Peripheral IV 05/12/21 Left Antecubital less than 1 day          Arterial Line            Arterial Line 05/10/21 Right Radial 2 days          Drain            Urethral Catheter Latex 16 Fr  2 days                Physical Exam:  Vitals: Blood pressure 129/71, pulse (!) 44, temperature 97 7 °F (36 5 °C), temperature source Oral, resp  rate 12, height 5' 4" (1 626 m), weight (!) 137 kg (302 lb 0 5 oz), SpO2 97 %  ,Body mass index is 51 84 kg/m²  Hemodynamic Monitoring: MAP: Arterial Line MAP (mmHg): 80 mmHg    General appearance: alert, appears stated age, cooperative and no distress  Head: Normocephalic, left retromastoid craniotomy incision clean, dry, intact, no erythema or drainage noted but some ecchymosis noted  Eyes: L 4th nerve palsy otherwise EOMI, PERRL, conjugate gaze   R eye nystagmus   Neck: supple, symmetrical, trachea midline   Lungs: non labored breathing, on simple mask  Heart: bradycardic   Neurologic:   Mental status: Alert, oriented x3, thought content appropriate, dysarthria, following commends  Cranial nerves: grossly intact (Cranial nerves II-XII)except left facial droop, tongue laceration, L 4th nerve palsy and R eye nystagmus   Sensory: normal to LT in all extremities x4, JPS and DST intact  Motor: moving all extremities without focal weakness, strength 5/5 throughout   Reflexes: 2+ and symmetric, no Hoffmans appreciated but R clonus noted   Coordination:Mild left dysmetria but patient able to self-correct , no drift bilaterally      Lab Results:  Results from last 7 days   Lab Units 05/11/21  0450 05/10/21  1957 05/10/21  1802 05/10/21  1746   WBC Thousand/uL 14 44* 11 45* 7 19  --    HEMOGLOBIN g/dL 12 6 13 1 12 8  --    HEMATOCRIT % 36 7 39 6 37 4  --    PLATELETS Thousands/uL 181 188 179  --    NEUTROS PCT %  --  90*  --   --    MONOS PCT %  --  4  --   --    MONO PCT %  --   --   --  1*     Results from last 7 days   Lab Units 05/12/21  0512 05/11/21 0451 05/11/21  0002 05/10/21  1958 05/10/21  1722  05/10/21  1604 05/10/21  1503   POTASSIUM mmol/L 4 2 3 8 4 0 4 5  --    < >  --   --    CHLORIDE mmol/L 109* 111* 110* 113*  --    < >  --   --    CO2 mmol/L 26 24 23 17*  --    < >  --   --    CO2, I-STAT mmol/L  --   --   --   --  20*  --  17* 17*   BUN mg/dL 14 5 5 4*  --    < >  --   --    CREATININE mg/dL 0 66 0 70 0 74 0 98  --    < >  --   --    CALCIUM mg/dL 8 9 8 9 9 2 9 0  --    < >  --   --    ALK PHOS U/L  --  57 58 60  --   --   --   --    ALT U/L  --  202* 236* 257*  --   --   --   --    AST U/L  --  166* 192* 206*  --   --   --   --    GLUCOSE, ISTAT mg/dl  --   --   --   --  139  --  156* 153*    < > = values in this interval not displayed  Results from last 7 days   Lab Units 05/12/21  0512 05/11/21  0451 05/10/21  1958   MAGNESIUM mg/dL 2 5 2 0 1 9     Results from last 7 days   Lab Units 05/12/21  0512 05/11/21  0451 05/10/21  1958   PHOSPHORUS mg/dL 3 8 3 4 2 7     Results from last 7 days   Lab Units 05/11/21  0452 05/10/21  1958   INR  1 08 1 05   PTT seconds 26  --      No results found for: TROPONINT  ABG:  Lab Results   Component Value Date    PHART 7 419 05/11/2021    WZB2HCH 40 6 05/11/2021    PO2ART 58 2 (LL) 05/11/2021    PML4SKT 25 7 05/11/2021    BEART 1 1 05/11/2021    SOURCE Line, Arterial 05/11/2021       Imaging Studies: I have personally reviewed pertinent reports  and I have personally reviewed pertinent films in PACS    Ct Head Wo Contrast    Result Date: 5/11/2021  Impression: Status post left retromastoid craniotomy for mass resection  There is hemorrhage within the left lateral prepontine cistern corresponding to the site of the previous mass  Small amount of air and fluid is present  There appears to be a small parenchymal hemorrhage within the adjacent left lateral lauren, measuring approximately 1 1 cm in maximum oblique dimension   Small amount of subarachnoid hemorrhage is seen in the right parafalcine region posteriorly and extending along the superior tentorium  This examination was marked "immediate notification" in Epic in order to begin the standard process by which the radiology reading room liaison alerts the referring practitioner  Workstation performed: [id number]     Mri Brain W Wo Contrast    Result Date: 5/12/2021  Impression: 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  There is residual tumor identified as seen on series 10 image 7 and series 1100, image 25  There is edema and hemorrhage within the left lateral aspect of the lauren as well as a smaller focus of hemorrhage within the posterior central aspect of the brain  Mild associated edema  There is a small amount of acute subdural blood seen posteriorly within both hemispheres measuring approximately 2 mm in thickness  There is mild reactive dural thickening and enhancement seen diffusely  This examination was marked "immediate notification" in Epic in order to begin the standard process by which the radiology reading room liaison alerts the referring practitioner  Workstation performed: [id number]     Xr Chest Portable Icu    Result Date: 5/11/2021  Impression: Patchy bilateral airspace opacities suspicious for pulmonary edema versus multifocal infection  Workstation performed: [id number]       EKG, Pathology, and Other Studies: I have personally reviewed pertinent reports          VTE Mechanical Prophylaxis: sequential compression device

## 2021-05-12 NOTE — CASE MANAGEMENT
Pt is not a <30 day readmission or current bundle  CM met with pt bedside to introduce self/CM role and begin discharge planning  Pt on6L oxygen wearing simple mask  Pt lives with wife and 3 children in a 2 story house that has 4 AMRIT  Bathroom available on first floor  Pt independent with ADLs PTA  Ambulating independently no devices  No DME  Pt was driving and working as  prior to admission  No history of VNA, STR, inpatient MH treatment or D/A treatment reported  Pharmacy: [facility] in [place]  PCP: Dr Eric Patton  AD/LW/POA: None and declined information  Pt would like to designate wife Arturo ([phone]) as HCR is unable to make decisions for herself  CM to follow for d/c planning  CM reviewed d/c planning process including the following: identifying help at home, patient preference for d/c planning needs, Discharge Lounge, [facility] Meds to Bed program, availability of treatment team to discuss questions or concerns patient and/or family may have regarding understanding medications and recognizing signs and symptoms once discharged  CM also encouraged patient to follow up with all recommended appointments after discharge  Patient advised of importance for patient and family to participate in managing patients medical well being

## 2021-05-12 NOTE — ASSESSMENT & PLAN NOTE
Patient developed this postop  · Lactic acid trending down from 6 7 to 1 7  · IVFs DC'd  · Continue to monitor and trend  · Management per primary team

## 2021-05-12 NOTE — SPEECH THERAPY NOTE
Attempted to see pt for dysphagia tx, for potential diet advancement  Pt is too lethargic for PO at this time  She wakes up briefly but immediately falls back to sleep  States "I'm so sleepy"  ST will attempt again as able, when pt more alert

## 2021-05-12 NOTE — PROGRESS NOTES
Daily Progress Note - Critical Care   Malina Cheema 41 y o  female MRN: [id number]  Unit/Bed#: ICU 09 Encounter: [id number]        ----------------------------------------------------------------------------------------  HPI/24hr events: 53-year-old woman with 3-4mo hx of symptomatic cerebellopontine tumor now POD #2 s/p left retromastoid craniotomy  No overnight events  SBP goal is 120-140 and patient received 1 dose of prn hydralazine ovn to control BP     ---------------------------------------------------------------------------------------  SUBJECTIVE  Patient seen at bedside  She continues with Left-sided headache and neck pain at site of surgical incision  Now started on a full liquid diet and tolerating well  Review of Systems   Constitutional: Negative for fatigue and fever  Eyes: Positive for visual disturbance  Respiratory: Negative for cough, shortness of breath and wheezing  Cardiovascular: Negative for chest pain, palpitations and leg swelling  Gastrointestinal: Negative for abdominal pain, constipation, diarrhea, nausea and vomiting  Musculoskeletal: Positive for neck pain  Neurological: Positive for numbness and headaches  Negative for dizziness and seizures  Invasive lines and devices:   Invasive Devices     Peripheral Intravenous Line            Peripheral IV 05/10/21 Right Wrist 2 days    Peripheral IV 05/12/21 Left Antecubital less than 1 day          Arterial Line            Arterial Line 05/10/21 Right Radial 2 days          Drain            Urethral Catheter Latex 16 Fr  2 days              ---------------------------------------------------------------------------------------  Assessment and Plan:    Neuro:   · Diagnosis: 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  · 4/12 CT scan:2 3 cm partially calcified mass in the left cerebellopontine angle with regional mass effect   Recommend MRI for further evaluation  · 4/12 MRI:  2 3 x 2 2 x 2 5 cm enhancing extra-axial mass in the left petroclival region 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  There is mass effect on the brainstem with compression of the left lauren  No extension of the mass into the cavernous sinus or in the Meckel's cave  Mass lies posterior to the cavernous left carotid artery, lateral to the basilar artery, and inferior to the left posterior cerebral artery  No additional mass seen  · POD 2 left 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  · Postop CT head 5/11: There is hemorrhage within the left lateral prepontine cistern corresponding to the site of the previous mass  Small amount of air and fluid is present  Valeen Parkinson appears to be a small   parenchymal hemorrhage within the adjacent left lateral lauren, measuring approximately 1 1 cm in maximum oblique dimension  Small amount of subarachnoid hemorrhage is seen in the right parafalcine region posteriorly and extending along the superior tentorium  · 5/12 MRI: 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  There is residual tumor identified as seen on series 10 image 7 and series 1100, image 25  There is edema and hemorrhage within the left lateral aspect of the lauren as well as a smaller focus of hemorrhage within the posterior central aspect of the brain  Mild associated edema  There is a small amount of acute subdural blood seen posteriorly within both hemispheres measuring approximately 2 mm in thickness   There is mild reactive dural thickening and enhancement seen diffusely  · NSG is primary team   · Analgesia: Continue current pain regimen  · Seizure ppx: Keppra 750mg bid  · Continue decadron 72hr taper  · Speech eval follow up today - patient on a full liquid diet  · Repeat CT head tomorrow - ordered  · Delirium ppx: CAM-ICU, sleep hygiene  · Cont home med zoloft 50mg  · Headache: scheduled tylenol 975mg q8 ordered  · If neuro exam and CT remains stable, possible transfer out of ICU tomorrow         CV:   · Diagnosis: Asymptomatic Bradycardia  ? Continue to closely monitor on telemetry   ? EKG 5/11 - sinus kayley  ? Continue to closely monitor hemodynamics   ? Patient is meeting SBP goal of 120-140  ? Hydralazine 10mg q4h prn added to regimen  ? Can follow up with cardio after ICU course  · MAP goal > 65  · Hyperlipidemia: lipid panel done 5/3/21 showed , HDL 52, chol 277 and   · Consider starting statin following ICU course        Pulm:  · Diagnosis: Acute respiratory insuffiency   · CXR 5/11: Patchy bilateral airspace opacities suspicious for pulmonary edema versus multifocal infection  ? Extubated post-op   ? Desaturation episodes but LCTA on exam  ? Currently on 6L NC   § Wean as tolerated   § Maintain SaO2 > 92%   ? Encourage good pulm hygiene  ? Frequent Incentive spirometry  ? Will give trial of diuresis today with IV Lasix 20mg x 1     GI:   · Diagnosis: Transaminitis possibly due to GOMEZ   · CT abd 2020: mild hepatomegaly  · Patient will need outpatient followup following discharge (not acutely concerning at this time)  Lab Units 05/11/21  0451 05/11/21  0002 05/10/21  1958   AST U/L 166* 192* 206*   ALT U/L 202* 236* 257*      · Nausea: prn Zofran IV ordered in addition to Zyprexa 5mg qHS  · Bowel regimen: Senakot and MiraLax   ? Advance as needed     Renal/:   · Diagnosis: Lactic acidosis   ? Noted to be acidotic perioperatively  ? Lactic acid has now cleared, will stop trending  ? Lactic 6 3 > 6 7 > 3 1 > 2 6 > 2 5 > 1 7  ? IVF Isolyte discontinued  · Continue Turcios catheter in-situ  · Patient is net +160ml over 24 hrs  · Cont strict I&O monitoring     F/E/N:   · Fluids - monitor off IVF   · Electrolytes - No electrolyte abnormalities identified today, trend and replete as needed   · Nutrition - now on a full liquid diet with thins and tolerating well    · Follow up speech eval today      Heme/Onc:   · Diagnosis: no acute issues · Mild Leukocytosis 2/2 postop stress response vs steroid therapy (decadron)  · Will recheck CBC in am   · Hb  - 12 6  · DVT ppx: SCDs only  · Hold chem ppx for now     Endo:   · Diagnosis: no acute issues  · Maintain -180      ID:   · Diagnosis: no acute issues   ? Completed surgical ppx with Ancef  ? No fever spikes ovn   ? Reactive leukocytosis for now, monitor  ? monitor closely for signs of infection         MSK/Skin:   · Diagnosis: tongue lacerations, right shoulder pain  ? No active bleeding   ? Pain control on board: laci tylenol 975mg q8h, Robaxin 750mg q6h  ? Follow up speech eval      Disposition: Continue Critical Care   Code Status: Level 1 - Full Code  Diet: Diet Surgical; Full Liquid; Full Liquid  ---------------------------------------------------------------------------------------  OBJECTIVE    Vitals   Vitals:    21 0300 21 0400 21 0500 21 0800   BP:       Pulse: (!) 44 (!) 40 (!) 44 (!) 44   Resp: (!) 11 14 13 12   Temp:  97 8 °F (36 6 °C)  97 7 °F (36 5 °C)   TempSrc:  Oral  Oral   SpO2: 96% 95% 96% 97%   Weight:       Height:         Temp (24hrs), Av 7 °F (36 5 °C), Min:97 5 °F (36 4 °C), Max:98 °F (36 7 °C)  Current: Temperature: 97 7 °F (36 5 °C)    Respiratory:  SpO2: SpO2: 97 %, SpO2 Device: O2 Device: Simple mask  Nasal Cannula O2 Flow Rate (L/min): 6 L/min    Intake and Output  I/O       05/10 0701 -  0700  07 -  0700    P  O   340    I V  (mL/kg) 8103 8 (59 2)     IV Piggyback 1350 400    Total Intake(mL/kg) 9453 8 (69) 740 (5 4)    Urine (mL/kg/hr) 8810 (2 7) 580 (0 2)    Blood 250     Total Output 9060 580    Net +393 8 +160                Physical Exam  Vitals signs reviewed  Constitutional:       General: She is not in acute distress  Appearance: She is morbidly obese  She is not ill-appearing  Interventions: Face mask in place  HENT:      Head: Normocephalic and atraumatic        Right Ear: External ear normal  Left Ear: External ear normal       Mouth/Throat:      Mouth: Mucous membranes are moist       Tongue: Lesions present  Eyes:      Extraocular Movements: Extraocular movements intact  Conjunctiva/sclera: Conjunctivae normal    Neck:      Musculoskeletal: Normal range of motion  Cardiovascular:      Rate and Rhythm: Regular rhythm  Bradycardia present  Heart sounds: Normal heart sounds  No murmur  Pulmonary:      Effort: Pulmonary effort is normal  No respiratory distress  Breath sounds: Normal breath sounds  No rhonchi or rales  Abdominal:      General: Bowel sounds are normal       Palpations: Abdomen is soft  Tenderness: There is no abdominal tenderness  Musculoskeletal:         General: No swelling, tenderness, deformity or signs of injury  Right lower leg: No edema  Left lower leg: No edema  Skin:     General: Skin is warm  Coloration: Skin is not pale  Findings: No erythema or rash  Neurological:      General: No focal deficit present  Mental Status: She is alert and oriented to person, place, and time  Cranial Nerves: Dysarthria present  No facial asymmetry  Sensory: No sensory deficit  Motor: No weakness           Laboratory and Diagnostics:  Results from last 7 days   Lab Units 05/11/21  0450 05/10/21  1957 05/10/21  1802 05/10/21  1746 05/10/21  1722 05/10/21  1604 05/10/21  1503 05/10/21  1408   WBC Thousand/uL 14 44* 11 45* 7 19  --   --   --   --   --    HEMOGLOBIN g/dL 12 6 13 1 12 8  --   --   --   --   --    I STAT HEMOGLOBIN g/dl  --   --   --   --  11 2* 12 2 12 2 11 9   HEMATOCRIT % 36 7 39 6 37 4  --   --   --   --   --    HEMATOCRIT, ISTAT %  --   --   --   --  33* 36 36 35   PLATELETS Thousands/uL 181 188 179  --   --   --   --   --    NEUTROS PCT %  --  90*  --   --   --   --   --   --    MONOS PCT %  --  4  --   --   --   --   --   --    MONO PCT %  --   --   --  1*  --   --   --   --      Results from last 7 days Lab Units 05/12/21  0512 05/11/21  0451 05/11/21  0002 05/10/21  1958 05/10/21  1722 05/10/21  1640 05/10/21  1604   SODIUM mmol/L 142 142 141 142  --  143  --    POTASSIUM mmol/L 4 2 3 8 4 0 4 5  --  4 3  --    CHLORIDE mmol/L 109* 111* 110* 113*  --  113*  --    CO2 mmol/L 26 24 23 17*  --  15*  --    CO2, I-STAT mmol/L  --   --   --   --  20*  --  17*   ANION GAP mmol/L 7 7 8 12  --  15*  --    BUN mg/dL 14 5 5 4*  --  5  --    CREATININE mg/dL 0 66 0 70 0 74 0 98  --  0 94  --    CALCIUM mg/dL 8 9 8 9 9 2 9 0  --  8 7  --    GLUCOSE RANDOM mg/dL 112 138 154* 125  --  175*  --    ALT U/L  --  202* 236* 257*  --   --   --    AST U/L  --  166* 192* 206*  --   --   --    ALK PHOS U/L  --  57 58 60  --   --   --    ALBUMIN g/dL  --  3 5 3 7 3 7  --   --   --    TOTAL BILIRUBIN mg/dL  --  0 43 0 40 0 29  --   --   --      Results from last 7 days   Lab Units 05/12/21  0512 05/11/21  0451 05/10/21  1958 05/10/21  1640   MAGNESIUM mg/dL 2 5 2 0 1 9 1 7   PHOSPHORUS mg/dL 3 8 3 4 2 7  --       Results from last 7 days   Lab Units 05/11/21  0452 05/10/21  1958   INR  1 08 1 05   PTT seconds 26  --               Results from last 7 days   Lab Units 05/11/21  1433 05/11/21  0451 05/11/21  0002 05/10/21  2106 05/10/21  1853 05/10/21  1653   LACTIC ACID mmol/L 1 7 2 5* 2 6* 3 1* 6 7* 6 3*     ABG:  Results from last 7 days   Lab Units 05/11/21  0536 05/11/21  0001   PH ART  7 419 7 413   PCO2 ART mm Hg 40 6 33 5*   PO2 ART mm Hg 58 2* 58 7*   HCO3 ART mmol/L 25 7 20 9*   BASE EXC ART mmol/L 1 1 -2 9   ABG SOURCE   --  Line, Arterial     VBG:  Results from last 7 days   Lab Units 05/11/21  0001   ABG SOURCE  Line, Arterial       EKG: EKG: normal EKG, normal sinus rhythm, unchanged from previous tracings, sinus bradycardia  Imaging:   MRI brain w wo contrast   Final Result by Jeremias Vega DO (05/12 0219)      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    There is residual tumor identified as seen on series 10 image 7 and series 1100, image 25  There is edema and hemorrhage within the left lateral aspect of the lauren as well as a smaller focus of hemorrhage within the posterior central aspect of the brain  Mild associated edema  There is a small amount of acute subdural blood seen posteriorly within both hemispheres measuring approximately 2 mm in thickness  There is mild reactive dural thickening and enhancement seen diffusely  This examination was marked "immediate notification" in Epic in order to begin the standard process by which the radiology reading room liaison alerts the referring practitioner  Workstation performed: [id number]         XR chest portable ICU   Final Result by Johnny Woodward MD (05/11 0930)      Patchy bilateral airspace opacities suspicious for pulmonary edema versus multifocal infection  Workstation performed: [id number]         CT head wo contrast   Final Result by Christen Howe DO (05/11 7370)      Status post left retromastoid craniotomy for mass resection  There is hemorrhage within the left lateral prepontine cistern corresponding to the site of the previous mass  Small amount of air and fluid is present  There appears to be a small    parenchymal hemorrhage within the adjacent left lateral lauren, measuring approximately 1 1 cm in maximum oblique dimension  Small amount of subarachnoid hemorrhage is seen in the right parafalcine region posteriorly and extending along the superior tentorium  This examination was marked "immediate notification" in Epic in order to begin the standard process by which the radiology reading room liaison alerts the referring practitioner  Workstation performed: [id number]         CT head wo contrast    (Results Pending)    I have personally reviewed pertinent reports        Height and Weights   Height: 5' 4" (162 6 cm)  IBW (Ideal Body Weight): 54 7 kg  Body mass index is 51 84 kg/m²    Weight (last 2 days)     Date/Time   Weight    05/11/21 1442   (!) 137 (302 03)    05/10/21 1934   (!) 137 (301 37)    05/10/21 0601   132 (290)              Active Medications  Scheduled Meds:  Current Facility-Administered Medications   Medication Dose Route Frequency Provider Last Rate    acetaminophen  975 mg Oral Q8H [address] Ana Roger MD      calcium carbonate  1,000 mg Oral Daily PRN Sujeygene Kevin PA-C      dexamethasone  4 mg Intravenous Q6H [address] SujeyLAMONTE Caba-C      Followed by   Brittani Shackle ON 5/13/2021] dexamethasone  4 mg Intravenous Q8H Sujeykhai Kevin PA-C      Followed by   Brittani Shackle ON 5/16/2021] dexamethasone  2 mg Intravenous Q6H [address] Sujeysatish Kevin PA-C      Followed by   Brittani Shackle ON 5/19/2021] dexamethasone  2 mg Intravenous Q8H Sujey Dorita PA-C      Followed by   Brittani Shackle ON 5/22/2021] dexamethasone  2 mg Intravenous Q12H [address] Sujeysatish Kevin PA-C      Followed by   Brittani Shackle ON 5/25/2021] dexamethasone  2 mg Intravenous Q24H Sujeykhai Kevin PA-C      famotidine  40 mg Oral Daily Sujeykhai Kevin PA-C      hydrALAZINE  10 mg Intravenous Q4H PRN Ana Roger MD      HYDROmorphone  0 5 mg Intravenous Q3H PRN Luis Carlos Ellis PA-C      levETIRAcetam  750 mg Intravenous Q12H [address] Sujeysatish Kevin PA-C 750 mg (05/12/21 0826)    meclizine  25 mg Oral TID PRN Sujey Kevin PA-C      methocarbamol  750 mg Oral Q6H [address] Sujeysatish Kevin PA-C      multivitamin-minerals  1 tablet Oral Daily [place] Jerzy,       ondansetron  4 mg Intravenous Q6H PRN Sujey Kevin PA-C      oxyCODONE  10 mg Oral Q4H PRN Sujey Kevin PA-C      oxyCODONE  5 mg Oral Q4H PRN Sujey Kevin PA-C      polyethylene glycol  17 g Oral Daily Sujey Kevin PA-C      senna-docusate sodium  2 tablet Oral Daily Sujey Kevin PA-C      sertraline  100 mg Oral Daily Sujey Kevin PA-C       Continuous Infusions:     PRN Meds:   calcium carbonate, 1,000 mg, Daily PRN  hydrALAZINE, 10 mg, Q4H PRN  HYDROmorphone, 0 5 mg, Q3H PRN  meclizine, 25 mg, TID PRN  ondansetron, 4 mg, Q6H PRN  oxyCODONE, 10 mg, Q4H PRN  oxyCODONE, 5 mg, Q4H PRN        Allergies   No Known Allergies  ---------------------------------------------------------------------------------------  ICU CORE MEASURES    Prophylaxis   VTE Pharmacologic Prophylaxis: Not indicated at this time  VTE Mechanical Prophylaxis: sequential compression device  Stress Ulcer Prophylaxis: Prophylaxis Not Indicated     ABCDE Protocol (if indicated)  Plan to perform spontaneous awakening trial today? Not applicable  Plan to perform spontaneous breathing trial today? Not applicable  Obvious barriers to extubation? Not applicable  CAM-ICU: Negative  Can any invasive devices be discontinued today?  No  ---------------------------------------------------------------------------------------  Advance Directive and Living Will:      Power of :    POLST:    ---------------------------------------------------------------------------------------      Leanna Carmichael MD  PGY-1 Family Medicine  05/12/21

## 2021-05-13 ENCOUNTER — APPOINTMENT (INPATIENT)
Dept: RADIOLOGY | Facility: HOSPITAL | Age: 41
DRG: 025 | End: 2021-05-13
Payer: MEDICARE

## 2021-05-13 PROBLEM — E66.9 OBESITY: Status: RESOLVED | Noted: 2021-05-10 | Resolved: 2021-05-13

## 2021-05-13 PROBLEM — E66.01 MORBID OBESITY (HCC): Status: ACTIVE | Noted: 2021-05-10

## 2021-05-13 LAB
ABO GROUP BLD BPU: NORMAL
ABO GROUP BLD BPU: NORMAL
ANION GAP SERPL CALCULATED.3IONS-SCNC: 6 MMOL/L (ref 4–13)
BPU ID: NORMAL
BPU ID: NORMAL
BUN SERPL-MCNC: 15 MG/DL (ref 5–25)
CALCIUM SERPL-MCNC: 8.8 MG/DL (ref 8.3–10.1)
CHLORIDE SERPL-SCNC: 107 MMOL/L (ref 100–108)
CO2 SERPL-SCNC: 26 MMOL/L (ref 21–32)
CREAT SERPL-MCNC: 0.62 MG/DL (ref 0.6–1.3)
CROSSMATCH: NORMAL
CROSSMATCH: NORMAL
FOLATE SERPL-MCNC: 12 NG/ML (ref 3.1–17.5)
GFR SERPL CREATININE-BSD FRML MDRD: 112 ML/MIN/1.73SQ M
GLUCOSE SERPL-MCNC: 110 MG/DL (ref 65–140)
POTASSIUM SERPL-SCNC: 4 MMOL/L (ref 3.5–5.3)
SODIUM SERPL-SCNC: 139 MMOL/L (ref 136–145)
T4 FREE SERPL-MCNC: 0.97 NG/DL (ref 0.76–1.46)
TSH SERPL DL<=0.05 MIU/L-ACNC: 0.29 UIU/ML (ref 0.36–3.74)
UNIT DISPENSE STATUS: NORMAL
UNIT DISPENSE STATUS: NORMAL
UNIT PRODUCT CODE: NORMAL
UNIT PRODUCT CODE: NORMAL
UNIT RH: NORMAL
UNIT RH: NORMAL
VIT B12 SERPL-MCNC: 422 PG/ML (ref 100–900)

## 2021-05-13 PROCEDURE — 97116 GAIT TRAINING THERAPY: CPT

## 2021-05-13 PROCEDURE — 99024 POSTOP FOLLOW-UP VISIT: CPT | Performed by: PHYSICIAN ASSISTANT

## 2021-05-13 PROCEDURE — 84439 ASSAY OF FREE THYROXINE: CPT | Performed by: FAMILY MEDICINE

## 2021-05-13 PROCEDURE — 84443 ASSAY THYROID STIM HORMONE: CPT | Performed by: FAMILY MEDICINE

## 2021-05-13 PROCEDURE — 99291 CRITICAL CARE FIRST HOUR: CPT | Performed by: EMERGENCY MEDICINE

## 2021-05-13 PROCEDURE — 92526 ORAL FUNCTION THERAPY: CPT

## 2021-05-13 PROCEDURE — 70450 CT HEAD/BRAIN W/O DYE: CPT

## 2021-05-13 PROCEDURE — 97110 THERAPEUTIC EXERCISES: CPT

## 2021-05-13 PROCEDURE — 97167 OT EVAL HIGH COMPLEX 60 MIN: CPT

## 2021-05-13 PROCEDURE — G1004 CDSM NDSC: HCPCS

## 2021-05-13 PROCEDURE — 82607 VITAMIN B-12: CPT | Performed by: FAMILY MEDICINE

## 2021-05-13 PROCEDURE — 80048 BASIC METABOLIC PNL TOTAL CA: CPT | Performed by: FAMILY MEDICINE

## 2021-05-13 PROCEDURE — 82746 ASSAY OF FOLIC ACID SERUM: CPT | Performed by: FAMILY MEDICINE

## 2021-05-13 RX ORDER — HEPARIN SODIUM 5000 [USP'U]/ML
5000 INJECTION, SOLUTION INTRAVENOUS; SUBCUTANEOUS EVERY 8 HOURS SCHEDULED
Status: DISCONTINUED | OUTPATIENT
Start: 2021-05-13 | End: 2021-05-16 | Stop reason: HOSPADM

## 2021-05-13 RX ORDER — BISACODYL 10 MG
10 SUPPOSITORY, RECTAL RECTAL DAILY PRN
Status: DISCONTINUED | OUTPATIENT
Start: 2021-05-13 | End: 2021-05-16 | Stop reason: HOSPADM

## 2021-05-13 RX ADMIN — ACETAMINOPHEN 975 MG: 325 TABLET, FILM COATED ORAL at 06:10

## 2021-05-13 RX ADMIN — HEPARIN SODIUM 5000 UNITS: 5000 INJECTION INTRAVENOUS; SUBCUTANEOUS at 21:10

## 2021-05-13 RX ADMIN — OXYCODONE HYDROCHLORIDE 5 MG: 5 TABLET ORAL at 08:41

## 2021-05-13 RX ADMIN — ACETAMINOPHEN 975 MG: 325 TABLET, FILM COATED ORAL at 21:10

## 2021-05-13 RX ADMIN — DEXAMETHASONE SODIUM PHOSPHATE 4 MG: 4 INJECTION INTRA-ARTICULAR; INTRALESIONAL; INTRAMUSCULAR; INTRAVENOUS; SOFT TISSUE at 17:09

## 2021-05-13 RX ADMIN — DEXAMETHASONE SODIUM PHOSPHATE 4 MG: 4 INJECTION INTRA-ARTICULAR; INTRALESIONAL; INTRAMUSCULAR; INTRAVENOUS; SOFT TISSUE at 06:10

## 2021-05-13 RX ADMIN — SERTRALINE HYDROCHLORIDE 100 MG: 100 TABLET ORAL at 08:41

## 2021-05-13 RX ADMIN — POLYETHYLENE GLYCOL 3350 17 G: 17 POWDER, FOR SOLUTION ORAL at 08:41

## 2021-05-13 RX ADMIN — DEXAMETHASONE SODIUM PHOSPHATE 4 MG: 4 INJECTION INTRA-ARTICULAR; INTRALESIONAL; INTRAMUSCULAR; INTRAVENOUS; SOFT TISSUE at 11:02

## 2021-05-13 RX ADMIN — ACETAMINOPHEN 975 MG: 325 TABLET, FILM COATED ORAL at 13:47

## 2021-05-13 RX ADMIN — OXYCODONE HYDROCHLORIDE 5 MG: 5 TABLET ORAL at 21:10

## 2021-05-13 RX ADMIN — METHOCARBAMOL TABLETS 750 MG: 750 TABLET, COATED ORAL at 23:39

## 2021-05-13 RX ADMIN — DOCUSATE SODIUM AND SENNOSIDES 2 TABLET: 8.6; 5 TABLET ORAL at 08:40

## 2021-05-13 RX ADMIN — METHOCARBAMOL TABLETS 750 MG: 750 TABLET, COATED ORAL at 06:10

## 2021-05-13 RX ADMIN — LEVETIRACETAM 750 MG: 100 INJECTION, SOLUTION INTRAVENOUS at 21:09

## 2021-05-13 RX ADMIN — Medication 1 TABLET: at 08:41

## 2021-05-13 RX ADMIN — BISACODYL 10 MG: 10 SUPPOSITORY RECTAL at 13:47

## 2021-05-13 RX ADMIN — METHOCARBAMOL TABLETS 750 MG: 750 TABLET, COATED ORAL at 17:08

## 2021-05-13 RX ADMIN — FAMOTIDINE 40 MG: 20 TABLET ORAL at 08:41

## 2021-05-13 RX ADMIN — METHOCARBAMOL TABLETS 750 MG: 750 TABLET, COATED ORAL at 11:02

## 2021-05-13 RX ADMIN — LEVETIRACETAM 750 MG: 100 INJECTION, SOLUTION INTRAVENOUS at 08:44

## 2021-05-13 RX ADMIN — HEPARIN SODIUM 5000 UNITS: 5000 INJECTION INTRAVENOUS; SUBCUTANEOUS at 13:47

## 2021-05-13 NOTE — ASSESSMENT & PLAN NOTE
BMI Counseling: Body mass index is 51 84 kg/m²  The BMI is above normal  Patient referred to nutritionist due to patient being morbidly obese     Nutritionist following  Will follow up recommendations

## 2021-05-13 NOTE — PHYSICAL THERAPY NOTE
PHYSICAL THERAPY NOTE          Patient Name: Janay Lucia  GYVTP'F Date: 5/13/2021 05/13/21 1015   PT Last Visit   PT Visit Date 05/13/21   Note Type   Note Type Treatment   Pain Assessment   Pain Assessment Tool 0-10   Pain Score 4   Pain Location/Orientation Location: Head   Patient's Stated Pain Goal No pain   Hospital Pain Intervention(s) Repositioned   Restrictions/Precautions   Weight Bearing Precautions Per Order No   Other Precautions Cognitive; Chair Alarm; Bed Alarm; Fall Risk;Pain;Multiple lines;Telemetry   General   Chart Reviewed Yes   Family/Caregiver Present No   Cognition   Orientation Level Oriented X4   Subjective   Subjective Pt willing and agreeable to PT session   Bed Mobility   Supine to Sit Unable to assess   Sit to Supine Unable to assess   Additional Comments Patient found sitting in bedside chair  Left resting in bedside chair is requested, call bell and reach, chair alarm active   Transfers   Sit to Stand 4  Minimal assistance   Additional items Assist x 1; Increased time required;Verbal cues   Stand to Sit 4  Minimal assistance   Additional items Assist x 1; Increased time required;Verbal cues   Ambulation/Elevation   Gait pattern Excessively slow   Gait Assistance 3  Moderate assist   Additional items Assist x 1  (additional person to manage lines and chair follow)   Assistive Device Rolling walker   Distance 25   Balance   Static Sitting Fair   Dynamic Sitting Fair -   Static Standing Poor +   Dynamic Standing Poor   Ambulatory Poor   Endurance Deficit   Endurance Deficit Yes   Endurance Deficit Description limited by fatigue and dizziness   Activity Tolerance   Activity Tolerance Patient limited by fatigue;Treatment limited secondary to medical complications (Comment)   Medical Staff Made Aware OT   Nurse Made Aware yes, nsg gave clearance to work with pt   Exercises   THR 20 reps; Sitting;AROM; Bilateral Assessment   Prognosis Fair   Problem List Decreased strength;Decreased endurance; Impaired balance;Decreased mobility; Decreased coordination;Decreased safety awareness;Pain   Assessment Patient demonstrated good motivation to complete therapy the session  Required reeducation for hand placement during all transfers  Ambulated with slow moderately unsteady gait, decreased foot clearance, mild right-sided ataxia  Required occasional instruction to improve cervical positioning and visual fixation during ambulation  Continues to be limited by increased anxiety, increased dizziness with double vision despite use eye patch, and fatigue  High risk of falls with decreased safety awareness  Required increased time for all mobility, with frequent long standing rest breaks during ambulation  Will benefit from continued inpatient skilled physical therapy to maximize functional mobility and safety  Barriers to Discharge Inaccessible home environment   Goals   Patient Goals To get better   [facility] Expiration Date 05/25/21   Short Term Goal #1 1  Complete bed mobility and transfers I to decrease need for caregiver in home  2  Ambulate 300' I to complete household and community mobility without A  3  Improve dynamic balance to good to decrease need for UE support during ambulation  4  Be educated & demonstate 12 steps to be able to enter home without A  Plan   Treatment/Interventions OT; Spoke to nursing;Gait training;Bed mobility; Patient/family training; Therapeutic exercise;LE strengthening/ROM; Functional transfer training   PT Frequency Other (Comment)  (3-5x/wk)   Recommendation   PT Discharge Recommendation Post acute rehabilitation services   PT - OK to Discharge No     Pieter Jennings, PT

## 2021-05-13 NOTE — PLAN OF CARE
Problem: PHYSICAL THERAPY ADULT  Goal: Performs mobility at highest level of function for planned discharge setting  See evaluation for individualized goals  Description: Treatment/Interventions: OT, Spoke to case management, Spoke to nursing, Gait training, Bed mobility, Patient/family training, Endurance training, LE strengthening/ROM, Functional transfer training          See flowsheet documentation for full assessment, interventions and recommendations  Outcome: Progressing  Note: Prognosis: Fair  Problem List: Decreased strength, Decreased endurance, Impaired balance, Decreased mobility, Decreased coordination, Decreased safety awareness, Pain  Assessment: Patient demonstrated good motivation to complete therapy the session  Required reeducation for hand placement during all transfers  Ambulated with slow moderately unsteady gait, decreased foot clearance, mild right-sided ataxia  Required occasional instruction to improve cervical positioning and visual fixation during ambulation  Continues to be limited by increased anxiety, increased dizziness with double vision despite use eye patch, and fatigue  High risk of falls with decreased safety awareness  Required increased time for all mobility, with frequent long standing rest breaks during ambulation  Will benefit from continued inpatient skilled physical therapy to maximize functional mobility and safety  Barriers to Discharge: Inaccessible home environment        PT Discharge Recommendation: Post acute rehabilitation services     PT - OK to Discharge: No    See flowsheet documentation for full assessment

## 2021-05-13 NOTE — SPEECH THERAPY NOTE
Speech/Language Pathology Progress Note    Patient Name: Wm Marquez  AAVQF'V Date: 2021     Problem List  Principal Problem:    Cerebellopontine angle tumor ([facility] [address] )  Active Problems:    Depression    Gastroesophageal reflux disease    Morbid obesity ([facility] [address] )    Lactic acidosis    Subjective:  Records reviewed to begin  · MRI brain w/wo 21: 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  There is residual tumor identified as seen on series 10 image 7 and series 1100, image 25  There is edema and hemorrhage within the left lateral aspect of the lauren as well as a smaller focus of hemorrhage within the posterior central aspect of the brain   Mild associated edema  There is a small amount of acute subdural blood seen posteriorly within both hemispheres measuring approximately 2 mm in thickness   There is mild reactive dural thickening and enhancement seen diffusely  · CT head wo contrast 2021:  Postoperative changes with resection of left cerebellopontine angle mass  Vidhi  is resolving air in the postoperative bed   Slight increase in the overall size of the fluid and blood products within the postoperative bed  Slight decrease in the size of the intraparenchymal hemorrhage in the anterior, left lateral aspect of the lauren   Stable small bilateral supratentorial subdural hemorrhages  Per resident, "No obvious focal neurological deficits  Tolerating p o  intake without difficulty "     Objective:  Pt was seen for diagnostic dysphagia therapy to ensure tolerance of current diet (full liquid diet) and to assess potential for safe diet upgrade  Pt was alert and positioned upright  Pt was assessed with applesauce, toast and thin liquid to begin) then with chicken, mashed potatoes/gravy and green beans (as requested)  Mastication was mildly slowed appearing and mildly prolonged but  effective    Bolus formation and transfer were effective (piecemeal transfer with meat)  Swallow initiation appeared prompt  Laryngeal rise was good by palpation  No coughing, throat clearing, c/o stasis, multiple swallows, change in vocal quality noted c po intake today  Pt did c/o "slow" speech  She evidenced slowed rate in general   At times when more "normal' rate achieved, articulatory imprecision was present  Assessment:  Pt presented with no s/s oral/pharygneal dysphagia with solid food or thin liquid  She evidenced s/s mild dysarthria  Plan/Recommendations:  Consider upgrade to regular textured food, continue thin liquid  Motor Speech Evaluation/tx to follow prior to d/c or at next level of care

## 2021-05-13 NOTE — PROGRESS NOTES
[address] [facility]  Progress Note - Lang Lees 1980, 41 y o  female MRN: [id number]  Unit/Bed#: ICU 09 Encounter: [id number]  Primary Care Provider: Huber Branch MD   Date and time admitted to hospital: 5/10/2021  5:37 AM    * Cerebellopontine angle tumor [facility])  Assessment & Plan  POD3 left 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 (Left)  · Pre op complains of pain behind her left eye, headaches, and occasionally diplopia  She also had numbness and tingling bilaterally in her arms, RUE weakness  and developed some lightheadedness with balance difficulties  · Pathology pending    Imaging:  · CT head wo contrast 5/12/2021:  Postoperative changes with resection of left cerebellopontine angle mass  There is resolving air in the postoperative bed  Slight increase in the overall size of the fluid and blood products within the postoperative bed  Slight decrease in the size of the intraparenchymal hemorrhage in the anterior, left lateral aspect of the lauren  Stable small bilateral supratentorial subdural hemorrhages  · MRI brain w/wo 5/11/21: 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  There is residual tumor identified as seen on series 10 image 7 and series 1100, image 25  There is edema and hemorrhage within the left lateral aspect of the lauren as well as a smaller focus of hemorrhage within the posterior central aspect of the brain  Mild associated edema  There is a small amount of acute subdural blood seen posteriorly within both hemispheres measuring approximately 2 mm in thickness  There is mild reactive dural thickening and enhancement seen diffusely  Plan:  · Continue frequent neurological checks   Strength intact, sensation to LT improving in left hand, continues with slowed speech but feels tongue swelling is improving, continues with left eye issues as she is unable to move eye medially  · STAT CT head with any neurological decline including drop GCS of 2pts within 1 hr  · Pain control - well tolerated today, improving  · Continue Tylenol 975 mg q 8 hours scheduled  · Oxycodone 5 mg and 10 mg every 4 hours as needed for moderate and severe pain, respectively  · Will discontinue IV Dilaudid  · Bowel regimen - no BM and is not passing gas, no abdominal pain or distension noted  · Senokot S, miralax and suppository ordered, will attempt suppository today  · If continues without flatus may need KUB  · Recommend SBP < 160mmHg  · Keppra 750mg Q 12H x 7 days for seziure ppx   · Continue 72 hour Decadron taper  · Mobilize with PT/OT  · Speech eval, cleared patient for full liquids and thins  · DVT PPX: SCDs only at this time  Reviewing imaging with attending to decide if DVT ppx is okay to start given CT head findings this morning  Neurosurgery will continue to follow closely, patient needs to remain in ICU and is not medically stable for discharge, call with any questions or concerns  Lactic acidosis  Assessment & Plan  Patient developed this postop  · Lactic acid trending down from 6 7 to 1 7  · IVFs DC'd  · Continue to monitor and trend  · Management per [facility]      Subjective/Objective   Chief Complaint: "I feel better"    Subjective:  Patient states she is doing better today pain wise  Continues with slowed speech  Feels sensation in left hand is improving  Turcios removed, will attempt to urinate on her own  No bowel movement and is not passing gas but denies any abdominal pain  Eating okay  No new or worsening symptoms  Nursing rounds completed with Sarahy - no overnight events, will attempt to give suppository today to facilitate bowel movement  Objective: sitting up in bed, NAD    I/O       05/11 0701 - 05/12 0700 05/12 0701 - 05/13 0700 05/13 0701 - 05/14 0700    P  O  340 820 380    I V  (mL/kg)  120 (0 9) 20 (0 1)    IV Piggyback 400 200     Total Intake(mL/kg) 740 (5 4) 1140 (8 3) 400 (2 9)    Urine (mL/kg/hr) 580 (0 2) 1555 (0 5)     Blood       Total Output 580 1555     Net +160 -415 +400           Unmeasured Urine Occurrence  1 x           Invasive Devices     Peripheral Intravenous Line            Peripheral IV 05/10/21 Right Wrist 3 days    Peripheral IV 05/12/21 Left Antecubital 1 day                Physical Exam:  Vitals: Blood pressure 106/64, pulse 60, temperature 98 4 °F (36 9 °C), temperature source Oral, resp  rate 21, height 5' 4" (1 626 m), weight (!) 137 kg (302 lb 0 5 oz), SpO2 97 %  ,Body mass index is 51 84 kg/m²        General appearance: alert, appears stated age, cooperative and no distress  Head: left retromastoid craniotomy site clean dry intact with mild bruising noted  Eyes: unable to cross midline with left eye medially, left 4th nerve palsy  Neck: supple, symmetrical, trachea midline  Lungs: non labored breathing  Heart: regular heart rate  Abdomen:  Soft and nontender  Neurologic:   Mental status: Alert, oriented x3, follows commands, able to do simple calculations, speech is slowed but patient states this is stable, thought content appropriate  Cranial nerves: grossly intact (Cranial nerves II-XII) - left 4th palsy  Sensory:  Improved sensation to light touch in left hand  Motor: moving all extremities without focal weakness, strength 5/5 throughout  Reflexes:  No Omar or clonus noted  Coordination: finger to nose normal bilaterally, no drift bilaterally      Lab Results:  Results from last 7 days   Lab Units 05/11/21  0450 05/10/21  1957 05/10/21  1802 05/10/21  1746   WBC Thousand/uL 14 44* 11 45* 7 19  --    HEMOGLOBIN g/dL 12 6 13 1 12 8  --    HEMATOCRIT % 36 7 39 6 37 4  --    PLATELETS Thousands/uL 181 188 179  --    NEUTROS PCT %  --  90*  --   --    MONOS PCT %  --  4  --   --    MONO PCT %  --   --   --  1*     Results from last 7 days   Lab Units 05/13/21  0435 05/12/21  0512 05/11/21  0451 05/11/21  0002 05/10/21  1958 05/10/21  1722  05/10/21  1604 05/10/21  1503   POTASSIUM mmol/L 4 0 4 2 3 8 4 0 4 5  --    < >  --   --    CHLORIDE mmol/L 107 109* 111* 110* 113*  --    < >  --   --    CO2 mmol/L 26 26 24 23 17*  --    < >  --   --    CO2, I-STAT mmol/L  --   --   --   --   --  20*  --  17* 17*   BUN mg/dL 15 14 5 5 4*  --    < >  --   --    CREATININE mg/dL 0 62 0 66 0 70 0 74 0 98  --    < >  --   --    CALCIUM mg/dL 8 8 8 9 8 9 9 2 9 0  --    < >  --   --    ALK PHOS U/L  --   --  57 58 60  --   --   --   --    ALT U/L  --   --  202* 236* 257*  --   --   --   --    AST U/L  --   --  166* 192* 206*  --   --   --   --    GLUCOSE, ISTAT mg/dl  --   --   --   --   --  139  --  156* 153*    < > = values in this interval not displayed  Results from last 7 days   Lab Units 05/12/21  0512 05/11/21  0451 05/10/21  1958   MAGNESIUM mg/dL 2 5 2 0 1 9     Results from last 7 days   Lab Units 05/12/21  0512 05/11/21  0451 05/10/21  1958   PHOSPHORUS mg/dL 3 8 3 4 2 7     Results from last 7 days   Lab Units 05/11/21  0452 05/10/21  1958   INR  1 08 1 05   PTT seconds 26  --      No results found for: TROPONINT  ABG:  Lab Results   Component Value Date    PHART 7 419 05/11/2021    AIN4KGS 40 6 05/11/2021    PO2ART 58 2 (LL) 05/11/2021    KNY3IFK 25 7 05/11/2021    BEART 1 1 05/11/2021    SOURCE Line, Arterial 05/11/2021       Imaging Studies: I have personally reviewed pertinent reports  and I have personally reviewed pertinent films in PACS    Ct Head Wo Contrast    Result Date: 5/13/2021  Impression: 1  Postoperative changes with resection of left cerebellopontine angle mass  There is resolving air in the postoperative bed  Slight increase in the overall size of the fluid and blood products within the postoperative bed  2   Slight decrease in the size of the intraparenchymal hemorrhage in the anterior, left lateral aspect of the lauren  3   Stable small bilateral supratentorial subdural hemorrhages   Workstation performed: [id number]     Ct Head Wo Contrast    Result Date: 5/11/2021  Impression: Status post left retromastoid craniotomy for mass resection  There is hemorrhage within the left lateral prepontine cistern corresponding to the site of the previous mass  Small amount of air and fluid is present  There appears to be a small parenchymal hemorrhage within the adjacent left lateral lauren, measuring approximately 1 1 cm in maximum oblique dimension  Small amount of subarachnoid hemorrhage is seen in the right parafalcine region posteriorly and extending along the superior tentorium  This examination was marked "immediate notification" in Epic in order to begin the standard process by which the radiology reading room liaison alerts the referring practitioner  Workstation performed: [id number]     Mri Brain W Wo Contrast    Result Date: 5/12/2021  Impression: 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  There is residual tumor identified as seen on series 10 image 7 and series 1100, image 25  There is edema and hemorrhage within the left lateral aspect of the lauren as well as a smaller focus of hemorrhage within the posterior central aspect of the brain  Mild associated edema  There is a small amount of acute subdural blood seen posteriorly within both hemispheres measuring approximately 2 mm in thickness  There is mild reactive dural thickening and enhancement seen diffusely  This examination was marked "immediate notification" in Epic in order to begin the standard process by which the radiology reading room liaison alerts the referring practitioner  Workstation performed: [id number]     Xr Chest Portable Icu    Result Date: 5/11/2021  Impression: Patchy bilateral airspace opacities suspicious for pulmonary edema versus multifocal infection  Workstation performed: [id number]       EKG, Pathology, and Other Studies: I have personally reviewed pertinent reports  VTE Pharmacologic Prophylaxis: Reason for no pharmacologic prophylaxis - pending decision with attending on if okay to resume    VTE Mechanical Prophylaxis: sequential compression device

## 2021-05-13 NOTE — PLAN OF CARE
Problem: OCCUPATIONAL THERAPY ADULT  Goal: Performs self-care activities at highest level of function for planned discharge setting  See evaluation for individualized goals  Description: Treatment Interventions: ADL retraining, Visual perceptual retraining, Functional transfer training, UE strengthening/ROM, Endurance training, Cognitive reorientation, Patient/family training, Equipment evaluation/education, Compensatory technique education, Continued evaluation, Energy conservation, Activityengagement, Fine motor coordination activities          See flowsheet documentation for full assessment, interventions and recommendations  Note: Limitation: Decreased ADL status, Decreased UE strength, Decreased endurance, Visual deficit, Decreased self-care trans, Decreased high-level ADLs  Prognosis: Fair  Assessment: Pt is a 42 y/o female seen for OT eval s/p adm to [facility] w/ follow up for complaints of vertigo, headaches, RUE weakness, balance difficulties and lightheadedness  Pt initially presented to [facility] 4/12 w/ said complaints, was D/C'd home w/ follow up w/ NSx after being found to have cerebellopontine angle tumor  Pt now presents to [facility] s/p the following procedure "left 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 (Left)" performed on 5/10  Pt  has a past medical history of Always tired, Anesthesia, Balance problems, Bilateral numbness and tingling of arms and legs, Chest pain, Chronic pain disorder, Depressive disorder, Dizziness, Family history of bleeding disorder, GERD (gastroesophageal reflux disease), H1N1 influenza with pneumonia (2011), Headache, Joint pain, Migraine, Muscle weakness, Nausea & vomiting, Risk for falls, Shortness of breath, Swollen feet, Tinnitus, Wears glasses, and Word finding difficulty  Pt with active OT orders and up with assistance  orders  Pt lives with her spouse in 2 SH, 4 AMRIT, 1st and 2nd floor full bath, 11 steps to 2nd floor   Pt was I w/  ADLS and IADLS, drove, & required no use of DME PTA  Pt is currently demonstrating the following occupational deficits: Min A UB ADLS, Mod A LB ADLS, Min A transfers and Mod A functional mobility w/ RW  These deficits that are impacting pt's baseline areas of occupation are a result of the following impairments: pain, endurance, activity tolerance, functional mobility, forward functional reach, balance, functional standing tolerance, decreased I w/ ADLS/IADLS, strength, visual deficits, decreased insight into deficits, slurred speech and coordination deficits  The following Occupational Performance Areas to address include: bathing/shower, toilet hygiene, dressing, socialization, health maintenance, functional mobility, community mobility, clothing management, household maintenance and job performance/volunteering  Based on the aforementioned OT evaluation, functional performance deficits, and assessments, pt has been identified as a high complexity evaluation  Recommend STR upon D/C, when medically stable  The patient's raw score on the AM-PAC Daily Activity inpatient short form is 16, standardized score is 35 96, less than 39 4  Patients at this level are likely to benefit from discharge to post-acute rehabilitation services  Please refer to the recommendation of the Occupational Therapist for safe discharge planning   Pt to continue to benefit from acute immediat  Recommendation: (S) Physiatry Consult  OT Discharge Recommendation: Post acute rehabilitation services  OT - OK to Discharge: Yes(when medically stable)     Marisol Garcia MS, OTR/L

## 2021-05-13 NOTE — PROGRESS NOTES
Pastoral Care Progress Note    2021  Patient: Wm Marquez : 1980  Admission Date & Time: 5/10/2021 0537  MRN: [id number] CSN: [id number]                     Chaplaincy Interventions Utilized:   Empowerment: Encouraged focus on present and Provided chaplaincy education    Exploration: Explored emotional needs & resources and Explored spiritual needs & resources    Relationship Building: Cultivated a relationship of care and support    Chaplaincy Outcomes Achieved:  Expressed intermediate hope and Identified meaningful connections       21 1800   Clinical Encounter Type   Visited With Patient and family together   Routine Visit Introduction   Crisis Visit Critical Care

## 2021-05-13 NOTE — ASSESSMENT & PLAN NOTE
POD3 left 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 (Left)  · Pre op complains of pain behind her left eye, headaches, and occasionally diplopia  She also had numbness and tingling bilaterally in her arms, RUE weakness  and developed some lightheadedness with balance difficulties  · Pathology pending    Imaging:  · CT head wo contrast 5/12/2021:  Postoperative changes with resection of left cerebellopontine angle mass  There is resolving air in the postoperative bed  Slight increase in the overall size of the fluid and blood products within the postoperative bed  Slight decrease in the size of the intraparenchymal hemorrhage in the anterior, left lateral aspect of the lauren  Stable small bilateral supratentorial subdural hemorrhages  · MRI brain w/wo 5/11/21: 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  There is residual tumor identified as seen on series 10 image 7 and series 1100, image 25  There is edema and hemorrhage within the left lateral aspect of the lauren as well as a smaller focus of hemorrhage within the posterior central aspect of the brain  Mild associated edema  There is a small amount of acute subdural blood seen posteriorly within both hemispheres measuring approximately 2 mm in thickness  There is mild reactive dural thickening and enhancement seen diffusely  Plan:  · Continue frequent neurological checks   Strength intact, sensation to LT improving in left hand, continues with slowed speech but feels tongue swelling is improving, continues with left eye issues as she is unable to move eye medially  · STAT CT head with any neurological decline including drop GCS of 2pts within 1 hr  · Pain control - well tolerated today, improving  · Continue Tylenol 975 mg q 8 hours scheduled  · Oxycodone 5 mg and 10 mg every 4 hours as needed for moderate and severe pain, respectively  · Will discontinue IV Dilaudid  · Bowel regimen - no BM and is not passing gas, no abdominal pain or distension noted  · Senokot S, miralax and suppository ordered, will attempt suppository today  · If continues without flatus may need KUB  · Recommend SBP < 160mmHg  · Keppra 750mg Q 12H x 7 days for seziure ppx   · Continue 72 hour Decadron taper  · Mobilize with PT/OT  · Speech eval, cleared patient for full liquids and thins  · DVT PPX: SCDs only at this time  Reviewing imaging with attending to decide if DVT ppx is okay to start given CT head findings this morning  Neurosurgery will continue to follow closely, patient needs to remain in ICU and is not medically stable for discharge, call with any questions or concerns

## 2021-05-13 NOTE — ASSESSMENT & PLAN NOTE
Patient developed this postop  · Lactic acid trending down from 6 7 to 1 7  · IVFs DC'd  · Continue to monitor and trend  · Management per [facility]

## 2021-05-13 NOTE — OCCUPATIONAL THERAPY NOTE
Occupational Therapy Evaluation     Patient Name: Jin Cervantes  UOXEE'T Date: 5/13/2021  Problem List  Principal Problem:    Cerebellopontine angle tumor ([facility] [address] )  Active Problems:    Depression    Gastroesophageal reflux disease    Morbid obesity ([facility] [address] )    Lactic acidosis    Past Medical History  Past Medical History:   Diagnosis Date    Always tired     Anesthesia     "woke up during one ECT treatment also during wisdom teeth"    Balance problems     Bilateral numbness and tingling of arms and legs     "most of the time"    Chest pain      a few months ago went to ER [facility]," had testing and told everything ok"    Chronic pain disorder     Depressive disorder     Dizziness     "on way to work and felt like going to pass out, to ER and had CT SCAN/MRI brain; found brain mass"    Family history of bleeding disorder     "pt reports Mom has clotting problem"    GERD (gastroesophageal reflux disease)     H1N1 influenza with pneumonia 2011    Headache     Joint pain     neck and shoulders    Migraine     occas    Muscle weakness     Nausea & vomiting     occas    Risk for falls     Shortness of breath     "on exertion feels related to weight gain"    Swollen feet     Tinnitus     Wears glasses     and contacts; will wear glasses DOS    Word finding difficulty     "feels words dont come quickly and also forgetful"     Past Surgical History  Past Surgical History:   Procedure Laterality Date    COLONOSCOPY      ELECTROCONVULSIVE THERAPY      19 treatments    HYSTERECTOMY      NC EXCIS TRANSTEMP CP ANGLE TUMOR Left 5/10/2021    Procedure: left 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;  Surgeon: Mahad Morley MD;  [facility]: [facility];  Service: Neurosurgery    WISDOM TOOTH EXTRACTION               05/13/21 1002   OT Last Visit   OT Visit Date 05/13/21   Note Type   Note type Evaluation   Restrictions/Precautions   Weight Bearing Precautions Per Order No Other Precautions Cognitive; Chair Alarm;Multiple lines;Telemetry;O2;Fall Risk;Pain;Visual impairment; Seizure  (2 5L O2; dipoplia)   Pain Assessment   Pain Assessment Tool 0-10   Pain Score 4   Pain Location/Orientation Location: Head   Pain Onset/Description Onset: Ongoing; Descriptor: Aching;Descriptor: Discomfort; Other (Comment)  ("musculoskeletal pain")   Patient's Stated Pain Goal No pain   Hospital Pain Intervention(s) Repositioned; Ambulation/increased activity; Emotional support   Home Living   Type of [address] Two level;Performs ADLs on one level;Bed/bath upstairs; Able to live on main level with bedroom/bathroom  (4 AMRIT, 11 steps to bed/bath)   Bathroom Shower/Tub Tub/shower unit  (2nd floor has walk in shower)   [address] Other (Comment)  (denies any)   Home Equipment Other (Comment)  (denies any)   Additional Comments Pt reports living in 2 SH, 4 AMRIT, 1st floor setup available w/ full bath, 2nd floor has full bath/bed as well     Prior Function   Level of [place] Independent with ADLs and functional mobility   Lives With Spouse   Receives Help From Family   ADL Assistance Independent   IADLs Independent   Falls in the last 6 months 0   Vocational Full time employment   Comments Pt reports being I w/ ADLS/IADLS, transfers and functional mobility PTA   Lifestyle   Autonomy I ADLS/IADLS, transfers and functional mobility PTA   Reciprocal Relationships Pt lives w/ her spouse who works during the day, has 3 adult kids who are out of the house   Service to [place] Works full time as a  at [employer] [place], [place]"   [address] Enjoys puzzles   Psychosocial   Psychosocial (WDL) [address]  5  [address] [facility] 5  [address] [facility] 4  Minimal Assistance   LB [address] 3  Moderate Assistance   [address] 3 Moderate Assistance   Toileting Assistance  3  Moderate Assistance   Functional Assistance 3  Moderate Assistance   Functional Deficit Steadying;Verbal cueing;Supervision/safety; Increased time to complete   Bed Mobility   Supine to Sit Unable to assess   Sit to Supine Unable to assess   Additional Comments Pt seated up OOB in chair prior to and end of session   Transfers   Sit to Stand 4  Minimal assistance   Additional items Assist x 1; Increased time required;Verbal cues   Stand to Sit 4  Minimal assistance   Additional items Assist x 1; Increased time required;Verbal cues   Additional Comments Pt performed STS from recliner w/ Min A x1, RW for support in standing, VC for hand placement   Functional Mobility   Functional Mobility 3  Moderate assistance   Additional Comments Pt ambulated short household distance w/ Mod A x1, RW for support  SBA 2nd for safety  Additional items Rolling walker   Balance   Static Sitting Fair   Dynamic Sitting Fair -   Static Standing Poor +   Dynamic Standing Poor   Ambulatory Poor   Activity Tolerance   Activity Tolerance Patient limited by fatigue;Patient limited by pain   Medical Staff Made Aware PTPita   Nurse Made Aware yes,Sarahy ESCOTO Assessment   RUE Assessment WFL   LUE Assessment   LUE Assessment WFL   Hand Function   Gross Motor Coordination Functional   Fine Motor Coordination Functional  (dysmetria is slowed but accurate, L>R)   Sensation   Light Touch No apparent deficits   Proprioception   Proprioception No apparent deficits   Vision-Basic Assessment   Current Vision Wears glasses all the time   Patient Visual Report Diplopia; Blurring of print when reading   Vision - Complex Assessment   Ocular Range of Motion WFL   Head Position WDL   Diplopia Assessment Disappears wtih one eye closed  (patching intervention provided)   Cognition   Overall Cognitive Status WFL   Arousal/Participation Responsive; Cooperative   Attention Within functional limits   Orientation Level Oriented X4   Memory Within functional limits   Following Commands Follows one step commands without difficulty   Comments Pt is pleasant and cooperative; presents w/ dysarthria and visual deficits but is aware of ongoing functional limitations  Needs occasional cue for safety; is motivated to participate in therapy   Assessment   Limitation Decreased ADL status; Decreased UE strength;Decreased endurance;Visual deficit; Decreased self-care trans;Decreased high-level ADLs   Prognosis Fair   Assessment Pt is a 42 y/o female seen for OT eval s/p adm to [facility] w/ follow up for complaints of vertigo, headaches, RUE weakness, balance difficulties and lightheadedness  Pt initially presented to [facility] 4[facility] w/ said complaints, was D/C'd home w/ follow up w/ NSx after being found to have cerebellopontine angle tumor  Pt now presents to [facility] s/p the following procedure "left 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 (Left)" performed on 5/10  Pt  has a past medical history of Always tired, Anesthesia, Balance problems, Bilateral numbness and tingling of arms and legs, Chest pain, Chronic pain disorder, Depressive disorder, Dizziness, Family history of bleeding disorder, GERD (gastroesophageal reflux disease), H1N1 influenza with pneumonia (2011), Headache, Joint pain, Migraine, Muscle weakness, Nausea & vomiting, Risk for falls, Shortness of breath, Swollen feet, Tinnitus, Wears glasses, and Word finding difficulty  Pt with active OT orders and up with assistance  orders  Pt lives with her spouse in 2 SH, 4 AMRIT, 1st and 2nd floor full bath, 11 steps to 2nd floor  Pt was I w/  ADLS and IADLS, drove, & required no use of DME PTA  Pt is currently demonstrating the following occupational deficits: Min A UB ADLS, Mod A LB ADLS, Min A transfers and Mod A functional mobility w/ RW   These deficits that are impacting pt's baseline areas of occupation are a result of the following impairments: pain, endurance, activity tolerance, functional mobility, forward functional reach, balance, functional standing tolerance, decreased I w/ ADLS/IADLS, strength, visual deficits, decreased insight into deficits, slurred speech and coordination deficits  The following Occupational Performance Areas to address include: bathing/shower, toilet hygiene, dressing, socialization, health maintenance, functional mobility, community mobility, clothing management, household maintenance and job performance/volunteering  Based on the aforementioned OT evaluation, functional performance deficits, and assessments, pt has been identified as a high complexity evaluation  Recommend STR upon D/C, when medically stable  The patient's raw score on the AM-PAC Daily Activity inpatient short form is 16, standardized score is 35 96, less than 39 4  Patients at this level are likely to benefit from discharge to post-acute rehabilitation services  Please refer to the recommendation of the Occupational Therapist for safe discharge planning  Pt to continue to benefit from acute immediate OT services to address the following goals 3-5x/week to  w/in 10-14 days:    Goals   Patient Goals to see and talk better   LTG Time Frame 10-14   Long Term Goal #1 see below listed goals   Plan   Treatment Interventions ADL retraining;Visual perceptual retraining;Functional transfer training;UE strengthening/ROM; Endurance training;Cognitive reorientation;Patient/family training;Equipment evaluation/education; Compensatory technique education;Continued evaluation; Energy conservation; Activityengagement; Fine motor coordination activities   Goal Expiration Date 21   OT Frequency 3-5x/wk   Recommendation   Recommendation Physiatry Consult   OT Discharge Recommendation Post acute rehabilitation services   OT - OK to Discharge Yes  (when medically stable)   -PAC Daily Activity Inpatient   Lower Body Dressing 2   Bathing 2   Toileting 2   Upper Body Dressing 3 Grooming 3   Eating 4   Daily Activity Raw Score 16   Daily Activity Standardized Score (Calc for Raw Score >=11) 35 96   AM-PAC Applied Cognition Inpatient   Following a Speech/Presentation 3   Understanding Ordinary Conversation 4   Taking Medications 4   Remembering Where Things Are Placed or Put Away 4   Remembering List of 4-5 Errands 4   Taking Care of Complicated Tasks 3   Applied Cognition Raw Score 22   Applied Cognition Standardized Score 47 83        GOALS    1) Pt will improve activity tolerance to G for 30 min txment sessions for increase engagement in functional tasks  2) Pt will complete UB/LB dressing/self care w/ S using adaptive device and DME as needed  3) Pt will complete bathing w/ S w/ use of AE and DME as needed  4) Pt will complete toileting w/ S w/ G hygiene/thoroughness using DME as needed  5) Pt will improve functional transfers to S on/off all surfaces using DME as needed w/ G balance/safety   6) Pt will improve functional mobility during ADL/IADL/leisure tasks to S using DME as needed w/ G balance/safety   7) Pt will improve dynamic standing balance to G for 20 mins w/ S, DME as needed, to increase engagement in standing ADL/IADL tasks  8) Pt will be attentive 100% of the time during ongoing functional/formal cognitive assessment w/ G participation to assist w/ safe d/c planning/recommendations  9) Pt will demonstrate G carryover of pt/caregiver education and training as appropriate w/o cues w/ good tolerance to increase safety during functional tasks  10) Pt will demonstrate 100% carryover of energy conservation techniques t/o functional I/ADL/leisure tasks w/o cues s/p skilled education to increase endurance during functional tasks  11) Pt will engage in ongoing  assessments, screens, and activities t/o fx'l I/ADL/leisure tasks w/ G participation to A w/ adaptation and accomodations or rule out visual perceptual impairments    Rasheed Laura MS, OTR/L

## 2021-05-13 NOTE — PROGRESS NOTES
Daily Progress Note - Critical Care   Severo Saucier 41 y o  female MRN: [id number]  Unit/Bed#: ICU 09 Encounter: [id number]        ----------------------------------------------------------------------------------------  HPI/24hr events: POD #3 S/P left retromastoid craniotomy for resection of large cerebellopontine angle mass  No OVN events  Patient's BP was within goal of 120-140 OVN   ---------------------------------------------------------------------------------------  SUBJECTIVE  Patient seen at bedside  She continues with left-sided headache and neck pain at site of surgical incision but reports that she feels much better today  She continues to feel drowsy, with slow movements and speech  Review of Systems   Constitutional: Negative for chills, fatigue and fever  HENT: Negative for sore throat  Eyes: Negative for photophobia, redness and visual disturbance  Respiratory: Negative for cough, shortness of breath and wheezing  Cardiovascular: Negative for chest pain, palpitations and leg swelling  Gastrointestinal: Negative for abdominal distention, abdominal pain, blood in stool, constipation, diarrhea, nausea and vomiting  Genitourinary: Negative for dysuria  Musculoskeletal: Positive for neck pain  Neurological: Positive for headaches  Negative for dizziness, weakness and numbness  Invasive lines and devices:   Invasive Devices     Peripheral Intravenous Line            Peripheral IV 05/10/21 Right Wrist 3 days    Peripheral IV 05/12/21 Left Antecubital 1 day                 ---------------------------------------------------------------------------------------  Assessment and Plan:    Neuro:   · Diagnosis: 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  · 4/12 CT scan:2 3 cm partially calcified mass in the left cerebellopontine angle with regional mass effect   Recommend MRI for further evaluation  · 4/12 MRI:  2 3 x 2 2 x 2 5 cm enhancing extra-axial mass in the left petroclival region 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  There is mass effect on the brainstem with compression of the left lauren  No extension of the mass into the cavernous sinus or in the Meckel's cave  Mass lies posterior to the cavernous left carotid artery, lateral to the basilar artery, and inferior to the left posterior cerebral artery  No additional mass seen  · POD 2 left 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  · Postop CT head : There is hemorrhage within the left lateral prepontine cistern corresponding to the site of the previous mass  Small amount of air and fluid is present  Vidhi  appears to be a small   parenchymal hemorrhage within the adjacent left lateral lauren, measuring approximately 1 1 cm in maximum oblique dimension  Small amount of subarachnoid hemorrhage is seen in the right parafalcine region posteriorly and extending along the superior tentorium  ·  MRI: 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  There is residual tumor identified as seen on series 10 image 7 and series 1100, image 25  There is edema and hemorrhage within the left lateral aspect of the lauren as well as a smaller focus of hemorrhage within the posterior central aspect of the brain  Mild associated edema  There is a small amount of acute subdural blood seen posteriorly within both hemispheres measuring approximately 2 mm in thickness  There is mild reactive dural thickening and enhancement seen diffusely  ·  repeat CTH: Postoperative changes with resection of left cerebellopontine angle mass  Vidhiargenis Mendez is resolving air in the postoperative bed  Slight increase in the overall size of the fluid and blood products within the postoperative bed  2  Slight decrease in the size of the intraparenchymal hemorrhage in the anterior, left lateral aspect of the lauren   3  Stable small bilateral supratentorial subdural hemorrhages  · NSG is primary team   · Analgesia: Continue current pain regimen  · Seizure ppx: Keppra 750mg bid until 5/17  · Continue decadron 72hr taper  · Cont home med zoloft 50mg  · Headache: scheduled tylenol 975mg q8 ordered  · If neuro exam and CT remains stable, possible transfer out of ICU tomorrow         CV:   · Diagnosis: Asymptomatic Bradycardia most likely 2/2 hyothyroidism  ? Unclear etiology initially, patient states this is "usual" for her  ? EKG 5/11 - sinus bradycardia  ? TSH 4/12 - 4 251  ? Repeat TSH today - 0 29 with normal T4  ? Continue to closely monitor hemodynamics   ? Patient is meeting SBP goal of 120-140  ? Hydralazine 10mg q4h prn on board  ? Will need close outpatient followup following ICU course  · MAP goal > 65  · Hyperlipidemia: lipid panel done 5/3/21 showed , HDL 52, chol 277 and   · Will need outpatient followup  · Arterial line d/c'd        Pulm:  · Diagnosis: Acute respiratory insuffiency   · CXR 5/11: Patchy bilateral airspace opacities suspicious for pulmonary edema versus multifocal infection  ? Extubated post-op   ? Desaturation episodes but LCTA on exam  ? Now on 3L NC   § Continue to wean as tolerated   § Maintain SaO2 > 92%   ? Encourage good pulm hygiene  ? Frequent Incentive spirometry  ? S/p IV Lasix 20mg x 1 diuresis on 5/12     GI:   · Diagnosis: Transaminitis possibly due to GOMEZ   · CT abd 2020: mild hepatomegaly  · Patient will need outpatient followup following discharge (not acutely concerning at this time)  Lab Units 05/11/21  0451 05/11/21  0002 05/10/21  1958   AST U/L 166* 192* 206*   ALT U/L 202* 236* 257*      · Nausea: prn Zofran IV ordered in addition to Zyprexa 5mg qHS  · Bowel regimen: Senakot and MiraLax  · Constipation: added dulcolax rectal suppository for today     Renal/:   · Diagnosis: Lactic acidosis   ? Noted to be acidotic perioperatively  ? Lactic acid has now cleared  ? Lactic 6 3 > 6 7 > 3 1 > 2 6 > 2 5 > 1 7  ?  IVF no longer indicated  · Turcios catheter d/c'd  · Cont strict I&O monitoring     F/E/N:   · Fluids - monitor off IVF   · Electrolytes - No electrolyte abnormalities identified today, trend and replete as needed   · Nutrition - now on a full liquid diet with thins and tolerating well  · Continue to advance diet as tolerated     Heme/Onc:   · Diagnosis: no acute issues   · Mild Leukocytosis 2/2 postop stress response vs steroid therapy (decadron)  · Macrocytic anemia: probably related to hypothyroidism  · Hb 5/11 - 12 6  · Will check folate and Vit B12 levels - normal levels  · DVT ppx: SCDs only  · Hold chem ppx for now     Endo:   · Diagnosis: Suspected hypothyroidism in the setting of slow movement, slow speech, bradycardia and macrocytic anemia  · 4/12 TSH indicative of hypothyroidism  · Repeat TSH and T4 today not indicative of hypothyroidism  · Will hold off on replacement therapy for now given thyroid testing today  · Will need outpatient follow up  · Maintain Blood Glucose 140-180      ID:   · Diagnosis: no acute issues   ? Completed surgical ppx with Ancef  ? No fever spikes since admission  ? Reactive leukocytosis for now, not concerning        MSK/Skin:   · Diagnosis: tongue laceration, right shoulder pain  ? Pain control on board: laci tylenol 975mg q8h, Robaxin 750mg q6h with prn opioids  ? Bowel regimen to prevent OIC  ? Will d/c IV dilaudid prn  ? Ambulate today with PT/OT      Disposition: Transfer to Stepdown Level 2  Code Status: Level 1 - Full Code  Diet: Diet Surgical; Full Liquid;  Full Liquid  ---------------------------------------------------------------------------------------  OBJECTIVE    Vitals   Vitals:    05/13/21 0829 05/13/21 0900 05/13/21 1000 05/13/21 1058   BP: 111/61 106/64 124/73 124/81   BP Location: Left arm      Pulse: (!) 42 60 60    Resp: 12 21 22    Temp: 98 4 °F (36 9 °C)   98 1 °F (36 7 °C)   TempSrc: Oral   Oral   SpO2: 96% 97% 97%    Weight:       Height:         Temp (24hrs), Av 9 °F (36 6 °C), Min:97 5 °F (36 4 °C), Max:98 4 °F (36 9 °C)  Current: Temperature: 98 1 °F (36 7 °C)    Respiratory:  SpO2: SpO2: 97 %, SpO2 Device: O2 Device: Nasal cannula  Nasal Cannula O2 Flow Rate (L/min): 4 L/min    Invasive/non-invasive ventilation settings   Respiratory    Lab Data (Last 4 hours)    None         O2/Vent Data (Last 4 hours)    None                Intake and Output  I/O        07 -  0700  07 -  0700    P  O  340 820    I V  (mL/kg)  120 (0 9)    IV Piggyback 400 200    Total Intake(mL/kg) 740 (5 4) 1140 (8 3)    Urine (mL/kg/hr) 580 (0 2) 1555 (0 5)    Total Output 580 1555    Net +160 -415          Unmeasured Urine Occurrence  1 x          Physical Exam  Vitals signs reviewed  Constitutional:       General: She is not in acute distress  Appearance: She is obese  She is not ill-appearing  HENT:      Head: Normocephalic and atraumatic  Right Ear: External ear normal       Left Ear: External ear normal       Mouth/Throat:      Mouth: Mucous membranes are moist    Eyes:      Conjunctiva/sclera: Conjunctivae normal    Neck:      Musculoskeletal: Normal range of motion  Cardiovascular:      Rate and Rhythm: Regular rhythm  Bradycardia present  Heart sounds: Normal heart sounds  No murmur  Pulmonary:      Effort: Pulmonary effort is normal  No respiratory distress  Breath sounds: Normal breath sounds  Chest:      Chest wall: No tenderness  Abdominal:      General: Bowel sounds are normal       Palpations: Abdomen is soft  Tenderness: There is no abdominal tenderness  Musculoskeletal:         General: No swelling, tenderness, deformity or signs of injury  Right lower leg: No edema  Left lower leg: No edema  Skin:     General: Skin is warm  Coloration: Skin is not pale  Findings: No erythema or rash  Neurological:      Mental Status: She is alert and oriented to person, place, and time  Cranial Nerves:  No cranial nerve deficit or facial asymmetry  Sensory: Sensation is intact  Motor: Motor function is intact  No weakness, tremor or abnormal muscle tone  Coordination: Coordination is intact        Comments: Patient still appears sleepy with slow movement and slow speech  Diplopia significantly improved  Coordination is intact but slow   Psychiatric:         Behavior: Behavior normal          Laboratory and Diagnostics:  Results from last 7 days   Lab Units 05/11/21  0450 05/10/21  1957 05/10/21  1802 05/10/21  1746 05/10/21  1722 05/10/21  1604 05/10/21  1503 05/10/21  1408   WBC Thousand/uL 14 44* 11 45* 7 19  --   --   --   --   --    HEMOGLOBIN g/dL 12 6 13 1 12 8  --   --   --   --   --    I STAT HEMOGLOBIN g/dl  --   --   --   --  11 2* 12 2 12 2 11 9   HEMATOCRIT % 36 7 39 6 37 4  --   --   --   --   --    HEMATOCRIT, ISTAT %  --   --   --   --  33* 36 36 35   PLATELETS Thousands/uL 181 188 179  --   --   --   --   --    NEUTROS PCT %  --  90*  --   --   --   --   --   --    MONOS PCT %  --  4  --   --   --   --   --   --    MONO PCT %  --   --   --  1*  --   --   --   --      Results from last 7 days   Lab Units 05/13/21 0435 05/12/21 0512 05/11/21 0451 05/11/21  0002 05/10/21  1958 05/10/21  1722 05/10/21  1640   SODIUM mmol/L 139 142 142 141 142  --  143   POTASSIUM mmol/L 4 0 4 2 3 8 4 0 4 5  --  4 3   CHLORIDE mmol/L 107 109* 111* 110* 113*  --  113*   CO2 mmol/L 26 26 24 23 17*  --  15*   CO2, I-STAT mmol/L  --   --   --   --   --  20*  --    ANION GAP mmol/L 6 7 7 8 12  --  15*   BUN mg/dL 15 14 5 5 4*  --  5   CREATININE mg/dL 0 62 0 66 0 70 0 74 0 98  --  0 94   CALCIUM mg/dL 8 8 8 9 8 9 9 2 9 0  --  8 7   GLUCOSE RANDOM mg/dL 110 112 138 154* 125  --  175*   ALT U/L  --   --  202* 236* 257*  --   --    AST U/L  --   --  166* 192* 206*  --   --    ALK PHOS U/L  --   --  57 58 60  --   --    ALBUMIN g/dL  --   --  3 5 3 7 3 7  --   --    TOTAL BILIRUBIN mg/dL  --   --  0 43 0 40 0 29  -- --      Results from last 7 days   Lab Units 05/12/21  0512 05/11/21  0451 05/10/21  1958 05/10/21  1640   MAGNESIUM mg/dL 2 5 2 0 1 9 1 7   PHOSPHORUS mg/dL 3 8 3 4 2 7  --       Results from last 7 days   Lab Units 05/11/21  0452 05/10/21  1958   INR  1 08 1 05   PTT seconds 26  --               Results from last 7 days   Lab Units 05/11/21  1433 05/11/21  0451 05/11/21  0002 05/10/21  2106 05/10/21  1853 05/10/21  1653   LACTIC ACID mmol/L 1 7 2 5* 2 6* 3 1* 6 7* 6 3*     ABG:  Results from last 7 days   Lab Units 05/11/21  0536 05/11/21  0001   PH ART  7 419 7 413   PCO2 ART mm Hg 40 6 33 5*   PO2 ART mm Hg 58 2* 58 7*   HCO3 ART mmol/L 25 7 20 9*   BASE EXC ART mmol/L 1 1 -2 9   ABG SOURCE   --  Line, Arterial     VBG:  Results from last 7 days   Lab Units 05/11/21  0001   ABG SOURCE  Line, Arterial       Micro        EKG: EKG: normal EKG, normal sinus rhythm, unchanged from previous tracings  Imaging:   CT head wo contrast   Final Result by Fabi Asencio DO (05/13 2134)   1  Postoperative changes with resection of left cerebellopontine angle mass  There is resolving air in the postoperative bed  Slight increase in the overall size of the fluid and blood products within the postoperative bed  2   Slight decrease in the size of the intraparenchymal hemorrhage in the anterior, left lateral aspect of the lauren  3   Stable small bilateral supratentorial subdural hemorrhages  Workstation performed: [id number]         MRI brain w wo contrast   Final Result by Heather Hook DO (05/12 7224)      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  There is residual tumor identified as seen on series 10 image 7 and series 1100, image 25  There is edema and hemorrhage within the left lateral aspect of the lauren as well as a smaller focus of hemorrhage within the posterior central aspect of the brain  Mild associated edema        There is a small amount of acute subdural blood seen posteriorly within both hemispheres measuring approximately 2 mm in thickness  There is mild reactive dural thickening and enhancement seen diffusely  This examination was marked "immediate notification" in Epic in order to begin the standard process by which the radiology reading room liaison alerts the referring practitioner  Workstation performed: [id number]         XR chest portable ICU   Final Result by Pushpa Sales MD (05/11 0930)      Patchy bilateral airspace opacities suspicious for pulmonary edema versus multifocal infection  Workstation performed: [id number]         CT head wo contrast   Final Result by Marlon Orellana DO (05/11 1126)      Status post left retromastoid craniotomy for mass resection  There is hemorrhage within the left lateral prepontine cistern corresponding to the site of the previous mass  Small amount of air and fluid is present  There appears to be a small    parenchymal hemorrhage within the adjacent left lateral lauren, measuring approximately 1 1 cm in maximum oblique dimension  Small amount of subarachnoid hemorrhage is seen in the right parafalcine region posteriorly and extending along the superior tentorium  This examination was marked "immediate notification" in Epic in order to begin the standard process by which the radiology reading room liaison alerts the referring practitioner  Workstation performed: [id number]          I have personally reviewed pertinent reports  Height and Weights   Height: 5' 4" (162 6 cm)  IBW (Ideal Body Weight): 54 7 kg  Body mass index is 51 84 kg/m²    Weight (last 2 days)     Date/Time   Weight    05/11/21 1442   (!) 137 (302 03)              Active Medications  Scheduled Meds:  Current Facility-Administered Medications   Medication Dose Route Frequency Provider Last Rate    acetaminophen  975 mg Oral Q8H Iwona Malik MD  bisacodyl  10 mg Rectal Daily PRN Pearla Warners, DO      calcium carbonate  1,000 mg Oral Daily PRN Sujey Biundo, PA-C      dexamethasone  4 mg Intravenous Q8H Sujey Biundo, PA-C      Followed by   Verdie Slice ON 5/16/2021] dexamethasone  2 mg Intravenous Q6H [address] Sujey Biundo, PA-C      Followed by   Verdie Slice ON 5/19/2021] dexamethasone  2 mg Intravenous Q8H Sujey Biundo, PA-C      Followed by   Verdie Slice ON 5/22/2021] dexamethasone  2 mg Intravenous Q12H [address] Sujey Biyoungo, PA-C      Followed by   Verdie Slice ON 5/25/2021] dexamethasone  2 mg Intravenous Q24H Sujey Biundo, PA-C      famotidine  40 mg Oral Daily Sujey Biundo, PA-C      hydrALAZINE  10 mg Intravenous Q4H PRN Sonam Mayfield MD      HYDROmorphone  0 5 mg Intravenous Q3H PRN Roseann Carvajal PA-C      levETIRAcetam  750 mg Intravenous Q12H [address] Sonam Mayfield  mg (05/13/21 0844)    meclizine  25 mg Oral TID PRN Sujey Biundo, BERYL      methocarbamol  750 mg Oral Q6H [address] Sujey Biundo, PA-C      multivitamin-minerals  1 tablet Oral Daily [place] Jerzy, DO      ondansetron  4 mg Intravenous Q6H PRN Sujey Biundo, PA-C      oxyCODONE  10 mg Oral Q4H PRN Sujey Biundo, PA-C      oxyCODONE  5 mg Oral Q4H PRN Sujey Biundo, PA-C      polyethylene glycol  17 g Oral Daily Sujey Biundo, PA-C      senna-docusate sodium  2 tablet Oral Daily Sujey Biundo, PA-C      sertraline  100 mg Oral Daily Sujey Biundo, PA-C       Continuous Infusions:     PRN Meds:   bisacodyl, 10 mg, Daily PRN  calcium carbonate, 1,000 mg, Daily PRN  hydrALAZINE, 10 mg, Q4H PRN  HYDROmorphone, 0 5 mg, Q3H PRN  meclizine, 25 mg, TID PRN  ondansetron, 4 mg, Q6H PRN  oxyCODONE, 10 mg, Q4H PRN  oxyCODONE, 5 mg, Q4H PRN        Allergies   No Known Allergies  ---------------------------------------------------------------------------------------  ICU CORE MEASURES    Prophylaxis   VTE Pharmacologic Prophylaxis: Not indicated at this time due to [address]  VTE Mechanical Prophylaxis: sequential compression device  Stress Ulcer Prophylaxis: Prophylaxis Not Indicated     ABCDE Protocol (if indicated)  Plan to perform spontaneous awakening trial today? Not applicable  Plan to perform spontaneous breathing trial today? Not applicable  Obvious barriers to extubation? Not applicable  CAM-ICU: Negative  Can any invasive devices be discontinued today?  Not applicable  ---------------------------------------------------------------------------------------  Advance Directive and Living Will:      Power of :    POLST:    ---------------------------------------------------------------------------------------      Tracy Kwan MD  PGY-1 Family Medicine  05/13/21

## 2021-05-14 LAB
ANION GAP SERPL CALCULATED.3IONS-SCNC: 6 MMOL/L (ref 4–13)
BUN SERPL-MCNC: 14 MG/DL (ref 5–25)
CALCIUM SERPL-MCNC: 8.6 MG/DL (ref 8.3–10.1)
CHLORIDE SERPL-SCNC: 107 MMOL/L (ref 100–108)
CO2 SERPL-SCNC: 26 MMOL/L (ref 21–32)
CREAT SERPL-MCNC: 0.57 MG/DL (ref 0.6–1.3)
ERYTHROCYTE [DISTWIDTH] IN BLOOD BY AUTOMATED COUNT: 11.9 % (ref 11.6–15.1)
GFR SERPL CREATININE-BSD FRML MDRD: 115 ML/MIN/1.73SQ M
GLUCOSE SERPL-MCNC: 105 MG/DL (ref 65–140)
HCT VFR BLD AUTO: 37.4 % (ref 34.8–46.1)
HGB BLD-MCNC: 12.6 G/DL (ref 11.5–15.4)
MCH RBC QN AUTO: 34.3 PG (ref 26.8–34.3)
MCHC RBC AUTO-ENTMCNC: 33.7 G/DL (ref 31.4–37.4)
MCV RBC AUTO: 102 FL (ref 82–98)
PLATELET # BLD AUTO: 209 THOUSANDS/UL (ref 149–390)
PMV BLD AUTO: 10.1 FL (ref 8.9–12.7)
POTASSIUM SERPL-SCNC: 3.8 MMOL/L (ref 3.5–5.3)
RBC # BLD AUTO: 3.67 MILLION/UL (ref 3.81–5.12)
SODIUM SERPL-SCNC: 139 MMOL/L (ref 136–145)
WBC # BLD AUTO: 9.68 THOUSAND/UL (ref 4.31–10.16)

## 2021-05-14 PROCEDURE — 80048 BASIC METABOLIC PNL TOTAL CA: CPT | Performed by: FAMILY MEDICINE

## 2021-05-14 PROCEDURE — 99024 POSTOP FOLLOW-UP VISIT: CPT | Performed by: PHYSICIAN ASSISTANT

## 2021-05-14 PROCEDURE — 85027 COMPLETE CBC AUTOMATED: CPT | Performed by: FAMILY MEDICINE

## 2021-05-14 RX ORDER — MAGNESIUM CARB/ALUMINUM HYDROX 105-160MG
296 TABLET,CHEWABLE ORAL ONCE
Status: COMPLETED | OUTPATIENT
Start: 2021-05-14 | End: 2021-05-14

## 2021-05-14 RX ORDER — LEVETIRACETAM 750 MG/1
750 TABLET ORAL EVERY 12 HOURS SCHEDULED
Status: DISCONTINUED | OUTPATIENT
Start: 2021-05-14 | End: 2021-05-16 | Stop reason: HOSPADM

## 2021-05-14 RX ADMIN — SERTRALINE HYDROCHLORIDE 100 MG: 100 TABLET ORAL at 08:18

## 2021-05-14 RX ADMIN — ACETAMINOPHEN 975 MG: 325 TABLET, FILM COATED ORAL at 04:56

## 2021-05-14 RX ADMIN — HEPARIN SODIUM 5000 UNITS: 5000 INJECTION INTRAVENOUS; SUBCUTANEOUS at 04:57

## 2021-05-14 RX ADMIN — METHOCARBAMOL TABLETS 750 MG: 750 TABLET, COATED ORAL at 23:45

## 2021-05-14 RX ADMIN — HYDROMORPHONE HYDROCHLORIDE 0.5 MG: 1 INJECTION, SOLUTION INTRAMUSCULAR; INTRAVENOUS; SUBCUTANEOUS at 04:54

## 2021-05-14 RX ADMIN — LEVETIRACETAM 750 MG: 100 INJECTION, SOLUTION INTRAVENOUS at 08:20

## 2021-05-14 RX ADMIN — POLYETHYLENE GLYCOL 3350 17 G: 17 POWDER, FOR SOLUTION ORAL at 08:17

## 2021-05-14 RX ADMIN — FAMOTIDINE 40 MG: 20 TABLET ORAL at 08:17

## 2021-05-14 RX ADMIN — DOCUSATE SODIUM AND SENNOSIDES 2 TABLET: 8.6; 5 TABLET ORAL at 08:18

## 2021-05-14 RX ADMIN — MAGNESIUM CITRATE 296 ML: 1.75 LIQUID ORAL at 14:47

## 2021-05-14 RX ADMIN — LEVETIRACETAM 750 MG: 750 TABLET ORAL at 20:25

## 2021-05-14 RX ADMIN — ACETAMINOPHEN 975 MG: 325 TABLET, FILM COATED ORAL at 21:47

## 2021-05-14 RX ADMIN — BISACODYL 20 MG: 5 TABLET, COATED ORAL at 17:20

## 2021-05-14 RX ADMIN — METHOCARBAMOL TABLETS 750 MG: 750 TABLET, COATED ORAL at 17:20

## 2021-05-14 RX ADMIN — METHOCARBAMOL TABLETS 750 MG: 750 TABLET, COATED ORAL at 04:55

## 2021-05-14 RX ADMIN — OXYCODONE HYDROCHLORIDE 10 MG: 10 TABLET ORAL at 14:53

## 2021-05-14 RX ADMIN — HEPARIN SODIUM 5000 UNITS: 5000 INJECTION INTRAVENOUS; SUBCUTANEOUS at 14:48

## 2021-05-14 RX ADMIN — Medication 1 TABLET: at 08:17

## 2021-05-14 RX ADMIN — OXYCODONE HYDROCHLORIDE 10 MG: 10 TABLET ORAL at 03:07

## 2021-05-14 RX ADMIN — DEXAMETHASONE SODIUM PHOSPHATE 4 MG: 4 INJECTION INTRA-ARTICULAR; INTRALESIONAL; INTRAMUSCULAR; INTRAVENOUS; SOFT TISSUE at 12:07

## 2021-05-14 RX ADMIN — ACETAMINOPHEN 975 MG: 325 TABLET, FILM COATED ORAL at 14:48

## 2021-05-14 RX ADMIN — HEPARIN SODIUM 5000 UNITS: 5000 INJECTION INTRAVENOUS; SUBCUTANEOUS at 21:47

## 2021-05-14 RX ADMIN — DEXAMETHASONE SODIUM PHOSPHATE 4 MG: 4 INJECTION INTRA-ARTICULAR; INTRALESIONAL; INTRAMUSCULAR; INTRAVENOUS; SOFT TISSUE at 17:21

## 2021-05-14 RX ADMIN — DEXAMETHASONE SODIUM PHOSPHATE 4 MG: 4 INJECTION INTRA-ARTICULAR; INTRALESIONAL; INTRAMUSCULAR; INTRAVENOUS; SOFT TISSUE at 03:02

## 2021-05-14 RX ADMIN — METHOCARBAMOL TABLETS 750 MG: 750 TABLET, COATED ORAL at 12:07

## 2021-05-14 NOTE — ASSESSMENT & PLAN NOTE
Patient developed this postop  · Lactic acid trending down from 6 7 to 1 7  · IVFs DC'd  · Continue to monitor and trend

## 2021-05-14 NOTE — PROGRESS NOTES
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[facility])  Assessment & Plan  POD4 left 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 (Left)  · Pre op complains of pain behind her left eye, headaches, and occasionally diplopia  She also had numbness and tingling bilaterally in her arms, RUE weakness  and developed some lightheadedness with balance difficulties  · Pathology pending    Imaging:  · CT head wo contrast 5/12/2021:  Postoperative changes with resection of left cerebellopontine angle mass  There is resolving air in the postoperative bed  Slight increase in the overall size of the fluid and blood products within the postoperative bed  Slight decrease in the size of the intraparenchymal hemorrhage in the anterior, left lateral aspect of the lauren  Stable small bilateral supratentorial subdural hemorrhages  · MRI brain w/wo 5/11/21: 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  There is residual tumor identified as seen on series 10 image 7 and series 1100, image 25  There is edema and hemorrhage within the left lateral aspect of the lauren as well as a smaller focus of hemorrhage within the posterior central aspect of the brain  Mild associated edema  There is a small amount of acute subdural blood seen posteriorly within both hemispheres measuring approximately 2 mm in thickness  There is mild reactive dural thickening and enhancement seen diffusely  Plan:  · Continue frequent neurological checks   Strength intact, sensation to LT improving in left hand, continues with slowed speech but feels tongue swelling is improving, continues with left eye issues as she is unable to move eye medially however double vision is subjectively improving  · STAT CT head with any neurological decline including drop GCS of 2pts within 1 hr  · Pain control - well tolerated today, improving  · Continue Tylenol 975 mg q 8 hours scheduled  · Oxycodone 5 mg and 10 mg every 4 hours as needed for moderate and severe pain, respectively  · Bowel regimen - no BM since surgery, patient feels constipated  · Senokot S, miralax and suppository ordered  · Will order mag citrate today - would ideally like patient to have BM prior to DC  · Recommend SBP < 160mmHg  · Keppra 750mg Q 12H x 7 days for seziure ppx, will change to PO  · Continue 72 hour Decadron taper  · Mobilize with PT/OT, recommending post acute rehab  Case Management to discuss placement options - patient would like to go home if possible, will discuss further with wife when she comes in, otherwise will set up home health services instead  · Speech eval, cleared patient for full liquids and thins  · DVT PPX: SCDs and heparin SQ    Neurosurgery will continue management as primary team   Patient is medically stable for discharge on 04/14/2021, pending placement to rehab  Please call with questions or concerns  Lactic acidosis  Assessment & Plan  Patient developed this postop  · Lactic acid trending down from 6 7 to 1 7  · IVFs DC'd  · Continue to monitor and trend      Subjective/Objective   Chief Complaint: "I am tired"    Subjective:  Patient states she is tired and not getting enough sleep  States vision is improving as is the left hand numbness  She states her tongue is also getting better although she still has some slowed speech  Has some tenderness at the incision site the pain medication is working  Urinating per baseline  Movement yet, would like to have something to help with constipation  Eating well  No new or worsening symptoms      Nursing rounds completed with Lazaro Haynes - neuro exam has been stable, would like neuro checks to be downgraded to q 4 hours patient states she is not sleeping well, has not had a bowel movement yet would like to have back citrate, vision is improving, double vision is narrowing  Objective:  Sitting up in bed, attempting to sleep, NAD    I/O       05/12 0701 - 05/13 0700 05/13 0701 - 05/14 0700 05/14 0701 - 05/15 0700    P  O  820 1350 360    I V  (mL/kg) 120 (0 9) 120 (0 9)     IV Piggyback 200 500     Total Intake(mL/kg) 1140 (8 3) 1970 (14 4) 360 (2 6)    Urine (mL/kg/hr) 1555 (0 5) 650 (0 2) 400 (0 6)    Total Output 1555 650 400    Net -415 +1320 -40           Unmeasured Urine Occurrence 1 x 1 x 1 x          Invasive Devices     Peripheral Intravenous Line            Peripheral IV 05/10/21 Right Wrist 4 days    Peripheral IV 05/12/21 Left Antecubital 2 days                Physical Exam:  Vitals: Blood pressure 115/72, pulse (!) 54, temperature 98 1 °F (36 7 °C), resp  rate 17, height 5' 4" (1 626 m), weight (!) 137 kg (302 lb 0 5 oz), SpO2 93 %  ,Body mass index is 51 84 kg/m²        General appearance: alert, appears stated age, cooperative and no distress  Head: Normocephalic, without obvious abnormality, atraumatic  Eyes: left eye drooping with inability to move eye medially on the left, 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) - stable  Neck: supple, symmetrical, trachea midline  Lungs: non labored breathing  Heart: regular heart rate  Neurologic:   Mental status: Alert, oriented x3, follows commands, speech slowed but stable since surgery, thought content appropriate  Cranial nerves: grossly intact (Cranial nerves II-XII) - mild left facial droop, stable  Sensory: decreased sensation left hand but reports improving  Motor: moving all extremities without focal weakness strength 5/5 throughout  Coordination: finger to nose normal bilaterally, no drift bilaterally      Lab Results:  Results from last 7 days   Lab Units 05/14/21  0532 05/11/21  0450 05/10/21  1957  05/10/21  1746   WBC Thousand/uL 9 68 14 44* 11 45*   < >  --    HEMOGLOBIN g/dL 12 6 12 6 13 1   < >  --    HEMATOCRIT % 37 4 36 7 39 6   < >  --    PLATELETS Thousands/uL 209 181 188   < >  --    NEUTROS PCT %  --   --  90*  --   --    MONOS PCT %  --   --  4  --   --    MONO PCT %  --   --   --   --  1*    < > = values in this interval not displayed  Results from last 7 days   Lab Units 05/14/21  0532 05/13/21  0435 05/12/21 0512 05/11/21 0451 05/11/21  0002 05/10/21  1958 05/10/21  1722  05/10/21  1604 05/10/21  1503   POTASSIUM mmol/L 3 8 4 0 4 2 3 8 4 0 4 5  --    < >  --   --    CHLORIDE mmol/L 107 107 109* 111* 110* 113*  --    < >  --   --    CO2 mmol/L 26 26 26 24 23 17*  --    < >  --   --    CO2, I-STAT mmol/L  --   --   --   --   --   --  20*  --  17* 17*   BUN mg/dL 14 15 14 5 5 4*  --    < >  --   --    CREATININE mg/dL 0 57* 0 62 0 66 0 70 0 74 0 98  --    < >  --   --    CALCIUM mg/dL 8 6 8 8 8 9 8 9 9 2 9 0  --    < >  --   --    ALK PHOS U/L  --   --   --  57 58 60  --   --   --   --    ALT U/L  --   --   --  202* 236* 257*  --   --   --   --    AST U/L  --   --   --  166* 192* 206*  --   --   --   --    GLUCOSE, ISTAT mg/dl  --   --   --   --   --   --  139  --  156* 153*    < > = values in this interval not displayed  Results from last 7 days   Lab Units 05/12/21  0512 05/11/21  0451 05/10/21  1958   MAGNESIUM mg/dL 2 5 2 0 1 9     Results from last 7 days   Lab Units 05/12/21  0512 05/11/21  0451 05/10/21  1958   PHOSPHORUS mg/dL 3 8 3 4 2 7     Results from last 7 days   Lab Units 05/11/21  0452 05/10/21  1958   INR  1 08 1 05   PTT seconds 26  --      No results found for: TROPONINT  ABG:  Lab Results   Component Value Date    PHART 7 419 05/11/2021    OWF3KIT 40 6 05/11/2021    PO2ART 58 2 (LL) 05/11/2021    CMJ0DAB 25 7 05/11/2021    BEART 1 1 05/11/2021    SOURCE Line, Arterial 05/11/2021       Imaging Studies: I have personally reviewed pertinent reports     and I have personally reviewed pertinent films in PACS    Ct Head Wo Contrast    Result Date: 5/13/2021  Impression: 1  Postoperative changes with resection of left cerebellopontine angle mass  There is resolving air in the postoperative bed  Slight increase in the overall size of the fluid and blood products within the postoperative bed  2   Slight decrease in the size of the intraparenchymal hemorrhage in the anterior, left lateral aspect of the lauren  3   Stable small bilateral supratentorial subdural hemorrhages  Workstation performed: [id number]     Ct Head Wo Contrast    Result Date: 5/11/2021  Impression: Status post left retromastoid craniotomy for mass resection  There is hemorrhage within the left lateral prepontine cistern corresponding to the site of the previous mass  Small amount of air and fluid is present  There appears to be a small parenchymal hemorrhage within the adjacent left lateral lauren, measuring approximately 1 1 cm in maximum oblique dimension  Small amount of subarachnoid hemorrhage is seen in the right parafalcine region posteriorly and extending along the superior tentorium  This examination was marked "immediate notification" in Epic in order to begin the standard process by which the radiology reading room liaison alerts the referring practitioner  Workstation performed: [id number]     Mri Brain W Wo Contrast    Result Date: 5/12/2021  Impression: 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  There is residual tumor identified as seen on series 10 image 7 and series 1100, image 25  There is edema and hemorrhage within the left lateral aspect of the lauren as well as a smaller focus of hemorrhage within the posterior central aspect of the brain  Mild associated edema  There is a small amount of acute subdural blood seen posteriorly within both hemispheres measuring approximately 2 mm in thickness  There is mild reactive dural thickening and enhancement seen diffusely  This examination was marked "immediate notification" in Epic in order to begin the standard process by which the radiology reading room liaison alerts the referring practitioner  Workstation performed: [id number]     Xr Chest Portable Icu    Result Date: 5/11/2021  Impression: Patchy bilateral airspace opacities suspicious for pulmonary edema versus multifocal infection  Workstation performed: [id number]       EKG, Pathology, and Other Studies: I have personally reviewed pertinent reports        VTE Pharmacologic Prophylaxis: Heparin    VTE Mechanical Prophylaxis: sequential compression device

## 2021-05-14 NOTE — CASE MANAGEMENT
A post acute care recommendation was made by your care team for Acute Rehab  Discussed [place] [facility] [place] with patient  List of facilities given to patient via in person  patient aware the list is custom filtered for them by zip code location and that [facility] post acute providers are designated   At this time, patient and wife only want [facility]

## 2021-05-14 NOTE — PLAN OF CARE
Problem: Prexisting or High Potential for Compromised Skin Integrity  Goal: Skin integrity is maintained or improved  Description: INTERVENTIONS:  - Identify patients at risk for skin breakdown  - Assess and monitor skin integrity  - Assess and monitor nutrition and hydration status  - Monitor labs   - Assess for incontinence   - Turn and reposition patient  - Assist with mobility/ambulation  - Relieve pressure over bony prominences  - Avoid friction and shearing  - Provide appropriate hygiene as needed including keeping skin clean and dry  - Evaluate need for skin moisturizer/barrier cream  - Collaborate with interdisciplinary team   - Patient/family teaching  - Consider wound care consult   Outcome: Progressing     Problem: PAIN - ADULT  Goal: Verbalizes/displays adequate comfort level or baseline comfort level  Description: Interventions:  - Encourage patient to monitor pain and request assistance  - Assess pain using appropriate pain scale  - Administer analgesics based on type and severity of pain and evaluate response  - Implement non-pharmacological measures as appropriate and evaluate response  - Consider cultural and social influences on pain and pain management  - Notify physician/advanced practitioner if interventions unsuccessful or patient reports new pain  Outcome: Progressing     Problem: INFECTION - ADULT  Goal: Absence or prevention of progression during hospitalization  Description: INTERVENTIONS:  - Assess and monitor for signs and symptoms of infection  - Monitor lab/diagnostic results  - Monitor all insertion sites, i e  indwelling lines, tubes, and drains  - Monitor endotracheal if appropriate and nasal secretions for changes in amount and color  - [place] appropriate cooling/warming therapies per order  - Administer medications as ordered  - Instruct and encourage patient and family to use good hand hygiene technique  - Identify and instruct in appropriate isolation precautions for identified infection/condition  Outcome: Progressing  Goal: Absence of fever/infection during neutropenic period  Description: INTERVENTIONS:  - Monitor WBC    Outcome: Progressing     Problem: SAFETY ADULT  Goal: Patient will remain free of falls  Description: INTERVENTIONS:  - Assess patient frequently for physical needs  -  Identify cognitive and physical deficits and behaviors that affect risk of falls    -  [place] fall precautions as indicated by assessment   - Educate patient/family on patient safety including physical limitations  - Instruct patient to call for assistance with activity based on assessment  - Modify environment to reduce risk of injury  - Consider OT/PT consult to assist with strengthening/mobility  Outcome: Progressing  Goal: Maintain or return to baseline ADL function  Description: INTERVENTIONS:  -  Assess patient's ability to carry out ADLs; assess patient's baseline for ADL function and identify physical deficits which impact ability to perform ADLs (bathing, care of mouth/teeth, toileting, grooming, dressing, etc )  - Assess/evaluate cause of self-care deficits   - Assess range of motion  - Assess patient's mobility; develop plan if impaired  - Assess patient's need for assistive devices and provide as appropriate  - Encourage maximum independence but intervene and supervise when necessary  - Involve family in performance of ADLs  - Assess for home care needs following discharge   - Consider OT consult to assist with ADL evaluation and planning for discharge  - Provide patient education as appropriate  Outcome: Progressing  Goal: Maintain or return mobility status to optimal level  Description: INTERVENTIONS:  - Assess patient's baseline mobility status (ambulation, transfers, stairs, etc )    - Identify cognitive and physical deficits and behaviors that affect mobility  - Identify mobility aids required to assist with transfers and/or ambulation (gait belt, sit-to-stand, lift, walker, cane, etc )  - [place] fall precautions as indicated by assessment  - Record patient progress and toleration of activity level on Mobility SBAR; progress patient to next Phase/Stage  - Instruct patient to call for assistance with activity based on assessment  - Consider rehabilitation consult to assist with strengthening/weightbearing, etc   Outcome: Progressing     Problem: DISCHARGE PLANNING  Goal: Discharge to home or other facility with appropriate resources  Description: INTERVENTIONS:  - Identify barriers to discharge w/patient and caregiver  - Arrange for needed discharge resources and transportation as appropriate  - Identify discharge learning needs (meds, wound care, etc )  - Arrange for interpretive services to assist at discharge as needed  - Refer to Case Management Department for coordinating discharge planning if the patient needs post-hospital services based on physician/advanced practitioner order or complex needs related to functional status, cognitive ability, or social support system  Outcome: Progressing     Problem: Knowledge Deficit  Goal: Patient/family/caregiver demonstrates understanding of disease process, treatment plan, medications, and discharge instructions  Description: Complete learning assessment and assess knowledge base  Interventions:  - Provide teaching at level of understanding  - Provide teaching via preferred learning methods  Outcome: Progressing     Problem: Potential for Falls  Goal: Patient will remain free of falls  Description: INTERVENTIONS:  - Assess patient frequently for physical needs  -  Identify cognitive and physical deficits and behaviors that affect risk of falls    -  [place] fall precautions as indicated by assessment   - Educate patient/family on patient safety including physical limitations  - Instruct patient to call for assistance with activity based on assessment  - Modify environment to reduce risk of injury  - Consider OT/PT consult to assist with strengthening/mobility  Outcome: Progressing     Problem: Nutrition/Hydration-ADULT  Goal: Nutrient/Hydration intake appropriate for improving, restoring or maintaining nutritional needs  Description: Monitor and assess patient's nutrition/hydration status for malnutrition  Collaborate with interdisciplinary team and initiate plan and interventions as ordered  Monitor patient's weight and dietary intake as ordered or per policy  Utilize nutrition screening tool and intervene as necessary  Determine patient's food preferences and provide high-protein, high-caloric foods as appropriate       INTERVENTIONS:  - Monitor oral intake, urinary output, labs, and treatment plans  - Assess nutrition and hydration status and recommend course of action  - Evaluate amount of meals eaten  - Assist patient with eating if necessary   - Allow adequate time for meals  - Recommend/ encourage appropriate diets, oral nutritional supplements, and vitamin/mineral supplements  - Order, calculate, and assess calorie counts as needed  - Recommend, monitor, and adjust tube feedings and TPN/PPN based on assessed needs  - Assess need for intravenous fluids  - Provide specific nutrition/hydration education as appropriate  - Include patient/family/caregiver in decisions related to nutrition  Outcome: Progressing

## 2021-05-14 NOTE — CASE MANAGEMENT
This CM discussed rehab facilities with patient  Patient wanting to go home at this time    Wife is visiting this afternoon and pt would like this CM to discuss options regarding DCP

## 2021-05-14 NOTE — ASSESSMENT & PLAN NOTE
POD4 left 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 (Left)  · Pre op complains of pain behind her left eye, headaches, and occasionally diplopia  She also had numbness and tingling bilaterally in her arms, RUE weakness  and developed some lightheadedness with balance difficulties  · Pathology pending    Imaging:  · CT head wo contrast 5/12/2021:  Postoperative changes with resection of left cerebellopontine angle mass  There is resolving air in the postoperative bed  Slight increase in the overall size of the fluid and blood products within the postoperative bed  Slight decrease in the size of the intraparenchymal hemorrhage in the anterior, left lateral aspect of the lauren  Stable small bilateral supratentorial subdural hemorrhages  · MRI brain w/wo 5/11/21: 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  There is residual tumor identified as seen on series 10 image 7 and series 1100, image 25  There is edema and hemorrhage within the left lateral aspect of the lauren as well as a smaller focus of hemorrhage within the posterior central aspect of the brain  Mild associated edema  There is a small amount of acute subdural blood seen posteriorly within both hemispheres measuring approximately 2 mm in thickness  There is mild reactive dural thickening and enhancement seen diffusely  Plan:  · Continue frequent neurological checks   Strength intact, sensation to LT improving in left hand, continues with slowed speech but feels tongue swelling is improving, continues with left eye issues as she is unable to move eye medially however double vision is subjectively improving  · STAT CT head with any neurological decline including drop GCS of 2pts within 1 hr  · Pain control - well tolerated today, improving  · Continue Tylenol 975 mg q 8 hours scheduled  · Oxycodone 5 mg and 10 mg every 4 hours as needed for moderate and severe pain, respectively  · Bowel regimen - no BM since surgery, patient feels constipated  · Senokot S, miralax and suppository ordered  · Will order mag citrate today - would ideally like patient to have BM prior to DC  · Recommend SBP < 160mmHg  · Keppra 750mg Q 12H x 7 days for seziure ppx, will change to PO  · Continue 72 hour Decadron taper  · Mobilize with PT/OT, recommending post acute rehab  Case Management to discuss placement options - patient would like to go home if possible, will discuss further with wife when she comes in, otherwise will set up home health services instead  · Speech eval, cleared patient for full liquids and thins  · DVT PPX: SCDs and heparin SQ    Neurosurgery will continue management as primary team   Patient is medically stable for discharge on 04/14/2021, pending placement to rehab  Please call with questions or concerns

## 2021-05-15 PROCEDURE — 97535 SELF CARE MNGMENT TRAINING: CPT

## 2021-05-15 PROCEDURE — 97530 THERAPEUTIC ACTIVITIES: CPT

## 2021-05-15 PROCEDURE — 99024 POSTOP FOLLOW-UP VISIT: CPT | Performed by: PHYSICIAN ASSISTANT

## 2021-05-15 PROCEDURE — 97116 GAIT TRAINING THERAPY: CPT

## 2021-05-15 RX ORDER — MAGNESIUM CARB/ALUMINUM HYDROX 105-160MG
296 TABLET,CHEWABLE ORAL ONCE
Status: DISCONTINUED | OUTPATIENT
Start: 2021-05-15 | End: 2021-05-16

## 2021-05-15 RX ADMIN — METHOCARBAMOL TABLETS 750 MG: 750 TABLET, COATED ORAL at 23:01

## 2021-05-15 RX ADMIN — DEXAMETHASONE SODIUM PHOSPHATE 4 MG: 4 INJECTION INTRA-ARTICULAR; INTRALESIONAL; INTRAMUSCULAR; INTRAVENOUS; SOFT TISSUE at 03:00

## 2021-05-15 RX ADMIN — DOCUSATE SODIUM AND SENNOSIDES 2 TABLET: 8.6; 5 TABLET ORAL at 08:38

## 2021-05-15 RX ADMIN — HEPARIN SODIUM 5000 UNITS: 5000 INJECTION INTRAVENOUS; SUBCUTANEOUS at 05:01

## 2021-05-15 RX ADMIN — METHOCARBAMOL TABLETS 750 MG: 750 TABLET, COATED ORAL at 11:03

## 2021-05-15 RX ADMIN — HEPARIN SODIUM 5000 UNITS: 5000 INJECTION INTRAVENOUS; SUBCUTANEOUS at 21:25

## 2021-05-15 RX ADMIN — LEVETIRACETAM 750 MG: 750 TABLET ORAL at 08:38

## 2021-05-15 RX ADMIN — ONDANSETRON 4 MG: 2 INJECTION INTRAMUSCULAR; INTRAVENOUS at 13:19

## 2021-05-15 RX ADMIN — SERTRALINE HYDROCHLORIDE 100 MG: 100 TABLET ORAL at 08:38

## 2021-05-15 RX ADMIN — Medication 1 TABLET: at 08:38

## 2021-05-15 RX ADMIN — ACETAMINOPHEN 975 MG: 325 TABLET, FILM COATED ORAL at 05:00

## 2021-05-15 RX ADMIN — POLYETHYLENE GLYCOL 3350 17 G: 17 POWDER, FOR SOLUTION ORAL at 08:38

## 2021-05-15 RX ADMIN — METHOCARBAMOL TABLETS 750 MG: 750 TABLET, COATED ORAL at 17:51

## 2021-05-15 RX ADMIN — FAMOTIDINE 40 MG: 20 TABLET ORAL at 08:38

## 2021-05-15 RX ADMIN — DEXAMETHASONE SODIUM PHOSPHATE 4 MG: 4 INJECTION INTRA-ARTICULAR; INTRALESIONAL; INTRAMUSCULAR; INTRAVENOUS; SOFT TISSUE at 17:52

## 2021-05-15 RX ADMIN — HEPARIN SODIUM 5000 UNITS: 5000 INJECTION INTRAVENOUS; SUBCUTANEOUS at 13:19

## 2021-05-15 RX ADMIN — LEVETIRACETAM 750 MG: 750 TABLET ORAL at 20:23

## 2021-05-15 RX ADMIN — METHOCARBAMOL TABLETS 750 MG: 750 TABLET, COATED ORAL at 05:01

## 2021-05-15 RX ADMIN — ACETAMINOPHEN 975 MG: 325 TABLET, FILM COATED ORAL at 13:19

## 2021-05-15 RX ADMIN — ACETAMINOPHEN 975 MG: 325 TABLET, FILM COATED ORAL at 21:25

## 2021-05-15 RX ADMIN — OXYCODONE HYDROCHLORIDE 10 MG: 10 TABLET ORAL at 03:30

## 2021-05-15 RX ADMIN — DEXAMETHASONE SODIUM PHOSPHATE 4 MG: 4 INJECTION INTRA-ARTICULAR; INTRALESIONAL; INTRAMUSCULAR; INTRAVENOUS; SOFT TISSUE at 11:03

## 2021-05-15 NOTE — [facility] ADMISSION
Referral received for consideration of patient for inpatient acute rehab  Noted that PM&R consult is pending, and we await their recommendations at this time  Will continue to follow and update as able

## 2021-05-15 NOTE — PROGRESS NOTES
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[facility])  Assessment & Plan  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)  · Pre op complains of pain behind her left eye, headaches, and occasionally diplopia  She also had numbness and tingling bilaterally in her arms, RUE weakness  and developed some lightheadedness with balance difficulties  · Pathology pending    Imaging:  · CT head wo contrast 5/12/2021:  Postoperative changes with resection of left cerebellopontine angle mass  There is resolving air in the postoperative bed  Slight increase in the overall size of the fluid and blood products within the postoperative bed  Slight decrease in the size of the intraparenchymal hemorrhage in the anterior, left lateral aspect of the lauren  Stable small bilateral supratentorial subdural hemorrhages  · MRI brain w/wo 5/11/21: 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  There is residual tumor identified as seen on series 10 image 7 and series 1100, image 25  There is edema and hemorrhage within the left lateral aspect of the lauren as well as a smaller focus of hemorrhage within the posterior central aspect of the brain  Mild associated edema  There is a small amount of acute subdural blood seen posteriorly within both hemispheres measuring approximately 2 mm in thickness  There is mild reactive dural thickening and enhancement seen diffusely  Plan:  · Continue frequent neurological checks   Strength intact, sensation to LT improving in left hand, continues with slowed speech but feels tongue swelling is improving, continues with left eye issues as she is unable to move eye medially however double vision is subjectively improving  · STAT CT head with any neurological decline including drop GCS of 2pts within 1 hr  · Pain control -   · Continue Tylenol 975 mg q 8 hours scheduled  · Oxycodone 5 mg and 10 mg every 4 hours as needed for moderate and severe pain, respectively  · Bowel regimen - no BM since surgery, patient feels constipated  · Senokot S, miralax and suppository ordered  · Had small BM over last 24 hrs, reordered Mag Citrate  · Recommend SBP < 160mmHg  · Keppra 750mg Q 12H x 7 days for seziure ppx, will change to PO  · Continue 72 hour Decadron taper  · Mobilize with PT/OT, recommending post acute rehab  Case Management to discuss placement options  Consult to PMR was placed  · Speech eval, cleared patient for full liquids and thins  · DVT PPX: SCDs and heparin SQ  · Completed nursing rounds with Lonnie Escobedo RN  Neurosurgery will continue management as primary team   Patient was medically stable for discharge on 05/14/2021, pending placement to rehab  Please call with questions or concerns  Lactic acidosis  Assessment & Plan  Patient developed this postop, now resolved  · Lactic acid trended down from 6 7 to normal at 1 7 on 5/11/21  · IVFs DC'd      Subjective/Objective     Chief Complaint: "I feel better today"    Subjective:  Reports she feels better today  Patient reports she continues to have headache and dizziness  Patient reports some improvement in her vision and movement of the left arm  Patient reports that when both eyes are open images on the right side appeared doubled however when she up in each eye at a time she only sees 1 object  Patient denies any new numbness, tingling, or weakness in bilateral arms or legs  Per nursing report, patient had a small bowel movement  Objective:  Alert and awake, no acute distress    I/O       05/13 0701 - 05/14 0700 05/14 0701 - 05/15 0700 05/15 0701 - 05/16 0700    P  O  1350 1440 I V  (mL/kg) 120 (0 9)      IV Piggyback 500      Total Intake(mL/kg) 1970 (14 4) 1440 (10 5)     Urine (mL/kg/hr) 650 (0 2) 400 (0 1)     Stool  0     Total Output 650 400     Net +1320 +1040            Unmeasured Urine Occurrence 1 x 7 x     Unmeasured Stool Occurrence  1 x           Invasive Devices     Peripheral Intravenous Line            Peripheral IV 05/12/21 Left Antecubital 3 days                Physical Exam:  Vitals: Blood pressure 124/76, pulse (!) 51, temperature 98 1 °F (36 7 °C), resp  rate 18, height 5' 4" (1 626 m), weight 136 kg (300 lb), SpO2 96 %  ,Body mass index is 51 49 kg/m²  General appearance:  Appears stated age  Head: Normocephalic, without obvious abnormality  Eyes: EOMI except left eye has limited movement medially  Neck: supple, symmetrical, trachea midline   Lungs: non labored breathing  Heart: regular heart rate  Neurologic:   Mental status: Alert, oriented, thought content appropriate  Cranial nerves: grossly intact (Cranial nerves II-XII) except mild left facial droop- stable  Sensory: Decreased sensation left hand otherwise intact in all other extremities  Motor: moving all extremities, strength 5/5 throughout  Coordination: finger to nose test normal bilaterally, no drift in bilaterally  Lab Results:  Results from last 7 days   Lab Units 05/14/21  0532 05/11/21  0450 05/10/21  1957  05/10/21  1746   WBC Thousand/uL 9 68 14 44* 11 45*   < >  --    HEMOGLOBIN g/dL 12 6 12 6 13 1   < >  --    HEMATOCRIT % 37 4 36 7 39 6   < >  --    PLATELETS Thousands/uL 209 181 188   < >  --    NEUTROS PCT %  --   --  90*  --   --    MONOS PCT %  --   --  4  --   --    MONO PCT %  --   --   --   --  1*    < > = values in this interval not displayed       Results from last 7 days   Lab Units 05/14/21  0532 05/13/21  0435 05/12/21  0512 05/11/21  0451 05/11/21  0002 05/10/21  1958 05/10/21  1722  05/10/21  1604 05/10/21  1503   POTASSIUM mmol/L 3 8 4 0 4 2 3 8 4 0 4 5  --    < >  -- --    CHLORIDE mmol/L 107 107 109* 111* 110* 113*  --    < >  --   --    CO2 mmol/L 26 26 26 24 23 17*  --    < >  --   --    CO2, I-STAT mmol/L  --   --   --   --   --   --  20*  --  17* 17*   BUN mg/dL 14 15 14 5 5 4*  --    < >  --   --    CREATININE mg/dL 0 57* 0 62 0 66 0 70 0 74 0 98  --    < >  --   --    CALCIUM mg/dL 8 6 8 8 8 9 8 9 9 2 9 0  --    < >  --   --    ALK PHOS U/L  --   --   --  57 58 60  --   --   --   --    ALT U/L  --   --   --  202* 236* 257*  --   --   --   --    AST U/L  --   --   --  166* 192* 206*  --   --   --   --    GLUCOSE, ISTAT mg/dl  --   --   --   --   --   --  139  --  156* 153*    < > = values in this interval not displayed  Results from last 7 days   Lab Units 05/12/21  0512 05/11/21  0451 05/10/21  1958   MAGNESIUM mg/dL 2 5 2 0 1 9     Results from last 7 days   Lab Units 05/12/21  0512 05/11/21  0451 05/10/21  1958   PHOSPHORUS mg/dL 3 8 3 4 2 7     Results from last 7 days   Lab Units 05/11/21  0452 05/10/21  1958   INR  1 08 1 05   PTT seconds 26  --      No results found for: TROPONINT  ABG:  Lab Results   Component Value Date    PHART 7 419 05/11/2021    AIC0ROZ 40 6 05/11/2021    PO2ART 58 2 (LL) 05/11/2021    JIE3HJI 25 7 05/11/2021    BEART 1 1 05/11/2021    SOURCE Line, Arterial 05/11/2021       Imaging Studies: I have personally reviewed pertinent reports and I have personally reviewed pertinent films in PACS    EKG, Pathology, and Other Studies: I have personally reviewed pertinent reports  VTE Pharmacologic Prophylaxis: Heparin    VTE Mechanical Prophylaxis: sequential compression device    PLEASE NOTE:  This encounter may have been completed utilizing the [employer] Direct Speech Voice Recognition Software  Grammatical errors, random word insertions, pronoun errors and incomplete sentences are occasional consequences of the system due to software limitations, ambient noise and hardware issues  These may be missed by proof reading prior to affixing electronic signature  Any questions or concerns about the content, text or information contained within the body of this dictation should be directly addressed to the advanced practitioner or physician for clarification

## 2021-05-15 NOTE — PHYSICAL THERAPY NOTE
Physical Therapy Progress Note     05/15/21 1425   Note Type   Note Type Treatment for insurance authorization   Pain Assessment   Pain Assessment Tool FLACC   Pain Rating: FLACC (Rest) - Face 1   Pain Rating: FLACC (Rest) - Legs 0   Pain Rating: FLACC (Rest) - Activity 0   Pain Rating: FLACC (Rest) - Cry 1   Pain Rating: FLACC (Rest) - Consolability 0   Score: FLACC (Rest) 2   Pain Rating: FLACC (Activity) - Face 1   Pain Rating: FLACC (Activity) - Legs 0   Pain Rating: FLACC (Activity) - Activity 0   Pain Rating: FLACC (Activity) - Cry 1   Pain Rating: FLACC (Activity) - Consolability 0   Score: FLACC (Activity) 2   Restrictions/Precautions   Other Precautions Pain; Fall Risk;Cognitive;Visual impairment  (diplopia, used eye patch over L eye during session)   Subjective   Subjective Pt encoutered supine in bed with wife present  Reports having headache & nausea prior to session, but feels better after medication  Reports feeling better overall in recent days  Wife says pt is returning closer to baseline  Pt & wife both had questions & concerns regarding discharge plan to rehab given pt's condition & history of depression, but felt more comfortable with situation after session  Spouse reports she will be taking off 1 week when pt discharges home & they have many friends & significant support system to assist at discharge  Bed Mobility   Supine to Sit 5  Supervision   Additional items Assist x 1;HOB elevated   Transfers   Sit to Stand 5  Supervision   Additional items Assist x 1; Armrests; Increased time required   Stand to Sit 5  Supervision   Additional items Assist x 1; Armrests; Increased time required   Ambulation/Elevation   Gait pattern Excessively slow; Step to;Short stride;Decreased L stance;Decreased foot clearance; Improper Weight shift; Shuffling   Gait Assistance 5  Supervision   Additional items Assist x 1   Assistive Device Rolling walker   Distance 40', 120', 160'   Stair Management Assistance 4 Minimal assist   Additional items Assist x 1   Stair Management Technique One rail L;Step to pattern; Foreward   Number of Stairs 3   Balance   Static Sitting Fair +   Static Standing Fair   Ambulatory Poor +   Endurance Deficit   Endurance Deficit Yes   Endurance Deficit Description fatigue   Activity Tolerance   Activity Tolerance Patient tolerated treatment well;Patient limited by fatigue   Assessment   Prognosis Fair   Problem List Decreased strength;Decreased endurance; Impaired balance;Decreased mobility; Decreased coordination;Decreased safety awareness;Pain   Assessment Pt demonstrated improvd mobility this sesison, performing all transfers without need for physical assist, and ambulated on levels with use of RW with assist only provided when pt demonstrated 1 minor LOB when attempting to perform dyanmic head & RUE movement to point  Pt able to recover with RW at that time  Pt navigated obstacles without significant difficulty depsite use of eye patch and performed dynamic turns & approached chair at conclusion of session without LOB  Did require instructions for RW proximity & management to maintain safety just prior to sitting, with pt & wife reporting understanding  Pt negotiated steps as noted above without incident & no complaints offered despite perofrming dynamic head movements to perform task successfully  Pt declined seated rests during session & only offers general fatigue at this time  At conclusion of ambulatory tasks, extensive discussion held with pt & wife regarding POC and discharge recommendations  Discussed role of rehab, pt's current function, progress & need for further recovery to return to baseline  Pt & spouse verbalized understanding of all discussions after & offer no further questions or concerns  Further discussion held with OTR post session regarding pt's cognitive status & ADL managment    At this time, recommendation is for inpatient rehab vs home with outpatient neuro PT pending further cognitive assessment & family's ability to provide increase in requisite social support upon discharge  Barriers to Discharge Decreased caregiver support   Goals   Patient Goals to get better & go home   STG Expiration Date 05/25/21   Plan   Treatment/Interventions Functional transfer training;LE strengthening/ROM; Elevations; Therapeutic exercise; Endurance training;Patient/family training;Equipment eval/education; Bed mobility;Gait training;OT   Progress Progressing toward goals   PT Frequency Other (Comment)  (3-5x/week)   Recommendation   PT Discharge Recommendation   (rehab vs home with OP neuro rhb pending support, cog assess)   Equipment Recommended [facility] Recommended HD Bariatric wheeled walker   PT - OK to Discharge Yes  (see assessment)   AM-PAC Basic Mobility Inpatient   Turning in Bed Without Bedrails 4   Lying on Back to Sitting on Edge of Flat Bed 4   Moving Bed to Chair 3   Standing Up From Chair 4   Walk in Room 4   Climb 3-5 Stairs 3   Basic Mobility Inpatient Raw Score 22   Basic Mobility Standardized Score 47 4   The patient's AM-PAC Basic Mobility Inpatient Short Form Raw Score is 22, Standardized Score is 47 4  A standardized score less than 42 9 suggests the patient may benefit from discharge to post-acute rehabilitation services  Please also refer to the recommendation of the Physical Therapist for safe discharge planning            Sadaf Rawls, PTA

## 2021-05-15 NOTE — PLAN OF CARE
Problem: PHYSICAL THERAPY ADULT  Goal: Performs mobility at highest level of function for planned discharge setting  See evaluation for individualized goals  Description: Treatment/Interventions: OT, Spoke to case management, Spoke to nursing, Gait training, Bed mobility, Patient/family training, Endurance training, LE strengthening/ROM, Functional transfer training          See flowsheet documentation for full assessment, interventions and recommendations  Outcome: Progressing  Note: Prognosis: Fair  Problem List: Decreased strength, Decreased endurance, Impaired balance, Decreased mobility, Decreased coordination, Decreased safety awareness, Pain  Assessment: Pt demonstrated improvd mobility this sesison, performing all transfers without need for physical assist, and ambulated on levels with use of RW with assist only provided when pt demonstrated 1 minor LOB when attempting to perform dyanmic head & RUE movement to point  Pt able to recover with RW at that time  Pt navigated obstacles without significant difficulty depsite use of eye patch and performed dynamic turns & approached chair at conclusion of session without LOB  Did require instructions for RW proximity & management to maintain safety just prior to sitting, with pt & wife reporting understanding  Pt negotiated steps as noted above without incident & no complaints offered despite perofrming dynamic head movements to perform task successfully  Pt declined seated rests during session & only offers general fatigue at this time  At conclusion of ambulatory tasks, extensive discussion held with pt & wife regarding POC and discharge recommendations  Discussed role of rehab, pt's current function, progress & need for further recovery to return to baseline  Pt & spouse verbalized understanding of all discussions after & offer no further questions or concerns    Further discussion held with OTR post session regarding pt's cognitive status & ADL managment  At this time, recommendation is for inpatient rehab vs home with outpatient neuro PT pending further cognitive assessment & family's ability to provide increase in requisite social support upon discharge  Barriers to Discharge: Decreased caregiver support        PT Discharge Recommendation: (rehab vs home with OP neuro rhb pending support, cog assess)     PT - OK to Discharge: Yes(see assessment)    See flowsheet documentation for full assessment

## 2021-05-15 NOTE — PLAN OF CARE
Problem: Prexisting or High Potential for Compromised Skin Integrity  Goal: Skin integrity is maintained or improved  Description: INTERVENTIONS:  - Identify patients at risk for skin breakdown  - Assess and monitor skin integrity  - Assess and monitor nutrition and hydration status  - Monitor labs   - Assess for incontinence   - Turn and reposition patient  - Assist with mobility/ambulation  - Relieve pressure over bony prominences  - Avoid friction and shearing  - Provide appropriate hygiene as needed including keeping skin clean and dry  - Evaluate need for skin moisturizer/barrier cream  - Collaborate with interdisciplinary team   - Patient/family teaching  - Consider wound care consult   Outcome: Progressing     Problem: PAIN - ADULT  Goal: Verbalizes/displays adequate comfort level or baseline comfort level  Description: Interventions:  - Encourage patient to monitor pain and request assistance  - Assess pain using appropriate pain scale  - Administer analgesics based on type and severity of pain and evaluate response  - Implement non-pharmacological measures as appropriate and evaluate response  - Consider cultural and social influences on pain and pain management  - Notify physician/advanced practitioner if interventions unsuccessful or patient reports new pain  Outcome: Progressing     Problem: INFECTION - ADULT  Goal: Absence or prevention of progression during hospitalization  Description: INTERVENTIONS:  - Assess and monitor for signs and symptoms of infection  - Monitor lab/diagnostic results  - Monitor all insertion sites, i e  indwelling lines, tubes, and drains  - Monitor endotracheal if appropriate and nasal secretions for changes in amount and color  - [place] appropriate cooling/warming therapies per order  - Administer medications as ordered  - Instruct and encourage patient and family to use good hand hygiene technique  - Identify and instruct in appropriate isolation precautions for identified infection/condition  Outcome: Progressing  Goal: Absence of fever/infection during neutropenic period  Description: INTERVENTIONS:  - Monitor WBC    Outcome: Progressing     Problem: SAFETY ADULT  Goal: Patient will remain free of falls  Description: INTERVENTIONS:  - Assess patient frequently for physical needs  -  Identify cognitive and physical deficits and behaviors that affect risk of falls    -  [place] fall precautions as indicated by assessment   - Educate patient/family on patient safety including physical limitations  - Instruct patient to call for assistance with activity based on assessment  - Modify environment to reduce risk of injury  - Consider OT/PT consult to assist with strengthening/mobility  Outcome: Progressing  Goal: Maintain or return to baseline ADL function  Description: INTERVENTIONS:  -  Assess patient's ability to carry out ADLs; assess patient's baseline for ADL function and identify physical deficits which impact ability to perform ADLs (bathing, care of mouth/teeth, toileting, grooming, dressing, etc )  - Assess/evaluate cause of self-care deficits   - Assess range of motion  - Assess patient's mobility; develop plan if impaired  - Assess patient's need for assistive devices and provide as appropriate  - Encourage maximum independence but intervene and supervise when necessary  - Involve family in performance of ADLs  - Assess for home care needs following discharge   - Consider OT consult to assist with ADL evaluation and planning for discharge  - Provide patient education as appropriate  Outcome: Progressing  Goal: Maintain or return mobility status to optimal level  Description: INTERVENTIONS:  - Assess patient's baseline mobility status (ambulation, transfers, stairs, etc )    - Identify cognitive and physical deficits and behaviors that affect mobility  - Identify mobility aids required to assist with transfers and/or ambulation (gait belt, sit-to-stand, lift, walker, cane, etc )  - [place] fall precautions as indicated by assessment  - Record patient progress and toleration of activity level on Mobility SBAR; progress patient to next Phase/Stage  - Instruct patient to call for assistance with activity based on assessment  - Consider rehabilitation consult to assist with strengthening/weightbearing, etc   Outcome: Progressing     Problem: DISCHARGE PLANNING  Goal: Discharge to home or other facility with appropriate resources  Description: INTERVENTIONS:  - Identify barriers to discharge w/patient and caregiver  - Arrange for needed discharge resources and transportation as appropriate  - Identify discharge learning needs (meds, wound care, etc )  - Arrange for interpretive services to assist at discharge as needed  - Refer to Case Management Department for coordinating discharge planning if the patient needs post-hospital services based on physician/advanced practitioner order or complex needs related to functional status, cognitive ability, or social support system  Outcome: Progressing     Problem: Knowledge Deficit  Goal: Patient/family/caregiver demonstrates understanding of disease process, treatment plan, medications, and discharge instructions  Description: Complete learning assessment and assess knowledge base  Interventions:  - Provide teaching at level of understanding  - Provide teaching via preferred learning methods  Outcome: Progressing     Problem: Potential for Falls  Goal: Patient will remain free of falls  Description: INTERVENTIONS:  - Assess patient frequently for physical needs  -  Identify cognitive and physical deficits and behaviors that affect risk of falls    -  [place] fall precautions as indicated by assessment   - Educate patient/family on patient safety including physical limitations  - Instruct patient to call for assistance with activity based on assessment  - Modify environment to reduce risk of injury  - Consider OT/PT consult to assist with strengthening/mobility  Outcome: Progressing     Problem: Nutrition/Hydration-ADULT  Goal: Nutrient/Hydration intake appropriate for improving, restoring or maintaining nutritional needs  Description: Monitor and assess patient's nutrition/hydration status for malnutrition  Collaborate with interdisciplinary team and initiate plan and interventions as ordered  Monitor patient's weight and dietary intake as ordered or per policy  Utilize nutrition screening tool and intervene as necessary  Determine patient's food preferences and provide high-protein, high-caloric foods as appropriate       INTERVENTIONS:  - Monitor oral intake, urinary output, labs, and treatment plans  - Assess nutrition and hydration status and recommend course of action  - Evaluate amount of meals eaten  - Assist patient with eating if necessary   - Allow adequate time for meals  - Recommend/ encourage appropriate diets, oral nutritional supplements, and vitamin/mineral supplements  - Order, calculate, and assess calorie counts as needed  - Recommend, monitor, and adjust tube feedings and TPN/PPN based on assessed needs  - Assess need for intravenous fluids  - Provide specific nutrition/hydration education as appropriate  - Include patient/family/caregiver in decisions related to nutrition  Outcome: Progressing

## 2021-05-15 NOTE — ASSESSMENT & PLAN NOTE
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)  · Pre op complains of pain behind her left eye, headaches, and occasionally diplopia  She also had numbness and tingling bilaterally in her arms, RUE weakness  and developed some lightheadedness with balance difficulties  · Pathology pending    Imaging:  · CT head wo contrast 5/12/2021:  Postoperative changes with resection of left cerebellopontine angle mass  There is resolving air in the postoperative bed  Slight increase in the overall size of the fluid and blood products within the postoperative bed  Slight decrease in the size of the intraparenchymal hemorrhage in the anterior, left lateral aspect of the lauren  Stable small bilateral supratentorial subdural hemorrhages  · MRI brain w/wo 5/11/21: 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  There is residual tumor identified as seen on series 10 image 7 and series 1100, image 25  There is edema and hemorrhage within the left lateral aspect of the lauren as well as a smaller focus of hemorrhage within the posterior central aspect of the brain  Mild associated edema  There is a small amount of acute subdural blood seen posteriorly within both hemispheres measuring approximately 2 mm in thickness  There is mild reactive dural thickening and enhancement seen diffusely  Plan:  · Continue frequent neurological checks   Strength intact, sensation to LT improving in left hand, continues with slowed speech but feels tongue swelling is improving, continues with left eye issues as she is unable to move eye medially however double vision is subjectively improving  · STAT CT head with any neurological decline including drop GCS of 2pts within 1 hr  · Pain control -   · Continue Tylenol 975 mg q 8 hours scheduled  · Oxycodone 5 mg and 10 mg every 4 hours as needed for moderate and severe pain, respectively  · Bowel regimen - no BM since surgery, patient feels constipated  · Senokot S, miralax and suppository ordered  · Had small BM over last 24 hrs, reordered Mag Citrate  · Recommend SBP < 160mmHg  · Keppra 750mg Q 12H x 7 days for seziure ppx, will change to PO  · Continue 72 hour Decadron taper  · Mobilize with PT/OT, recommending post acute rehab  Case Management to discuss placement options  Consult to PMR was placed  · Speech eval, cleared patient for full liquids and thins  · DVT PPX: SCDs and heparin SQ  · Completed nursing rounds with Rachid Flood RN  Neurosurgery will continue management as primary team   Patient was medically stable for discharge on 05/14/2021, pending placement to rehab  Please call with questions or concerns

## 2021-05-15 NOTE — PLAN OF CARE
Problem: OCCUPATIONAL THERAPY ADULT  Goal: Performs self-care activities at highest level of function for planned discharge setting  See evaluation for individualized goals  Description: Treatment Interventions: ADL retraining, Visual perceptual retraining, Functional transfer training, UE strengthening/ROM, Endurance training, Patient/family training, Equipment evaluation/education, Compensatory technique education, Continued evaluation, Energy conservation, Activityengagement          See flowsheet documentation for full assessment, interventions and recommendations  Outcome: Progressing  Note: Limitation: Decreased ADL status, Decreased UE strength, Decreased endurance, Visual deficit, Decreased self-care trans, Decreased high-level ADLs  Prognosis: Fair  Assessment: Patient participated in Skilled OT session this date with interventions consisting of ADL re training with the use of correct body mechnaics, safety awareness and fall prevention techniques,  therapeutic activities to: increase activity tolerance and increase OOB/ sitting tolerance   Upon arrival patient was found supine in bed  Pt demonstrated the following tasks: S sup to sit, S STS, CGA fnx ambulation with RW  Pt requires S for UBD,  CGA for LBD to maintain stance when pulling over hips  Utilizes cross-legged technique to don socks and underwear - does require increased time to complete tasks  Pt does c/o of HA, sensitivity to light, as well as increased fatigue  Patient continues to be functioning below baseline level, occupational performance remains limited secondary to factors listed above and increased risk for falls and injury  From OT standpoint, recommendation at time of d/c would be Short Term Rehab vs home with OPOT and increased support - pending progress and formal cognitive evaluation   Patient to benefit from continued Occupational Therapy treatment while in the hospital to address deficits as defined above and maximize level of functional independence with ADLs and functional mobility  Pt was left in chair with alarm on after session with all current needs met  The patient's raw score on the AM-PAC Daily Activity inpatient short form is 19, standardized score is 40 22, greater than 39 4  Patients at this level are likely to benefit from discharge to home  Please refer to the recommendation of the Occupational Therapist for safe discharge planning  Recommendation: (S) Physiatry Consult  OT Discharge Recommendation: Post acute rehabilitation services vs home with OPOT and increased support (pending progress and formal cog eval)  OT - OK to Discharge: Yes(to rehab, no to home)

## 2021-05-15 NOTE — OCCUPATIONAL THERAPY NOTE
Occupational Therapy Progress Note     Patient Name: Janay LATHAM Date: 5/15/2021  Problem List  Principal Problem:    Cerebellopontine angle tumor ([facility] [address] )  Active Problems:    Depression    Gastroesophageal reflux disease    Morbid obesity ([facility] [address] )    Lactic acidosis            05/15/21 1058   OT Last Visit   OT Visit Date 05/15/21   Note Type   Note Type Treatment   Restrictions/Precautions   Weight Bearing Precautions Per Order No   Other Precautions Chair Alarm; Bed Alarm; Fall Risk;Pain;Visual impairment   General   Response to Previous Treatment Patient with no complaints from previous session   Lifestyle   Autonomy I ADLS/IADLS, transfers and functional mobility PTA   Reciprocal Relationships Pt lives w/ her spouse who works during the day, has 3 adult kids who are out of the house   Service to Others Works full time as a  at [place] [id number] [employer]   Pain Assessment   Pain Assessment Tool 0-10   Pain Score 7   Pain Location/Orientation Location: Head   Hospital Pain Intervention(s) Repositioned; Ambulation/increased activity   ADL   Where Assessed Edge of bed   UB Dressing Assistance 5  Supervision/Setup   UB Dressing Deficit Thread LUE; Thread RUE; Increased time to complete   LB Dressing Assistance   (CGA)   LB Dressing Deficit Increased time to complete; Don/doff R sock; Don/doff L sock; Thread RLE into underwear; Thread LLE into underwear   LB Dressing Comments CGA in stance for pulling over hips, S for socks    Bed Mobility   Supine to Sit 5  Supervision   Additional items Increased time required   Sit to Supine Unable to assess   Transfers   Sit to Stand 5  Supervision   Additional items Assist x 1; Increased time required   Stand to Sit 5  Supervision   Additional items Assist x 1; Increased time required   Additional Comments Transfers with RW    Functional Mobility   Functional Mobility   (CGA)   Additional Comments bed to chair    Additional items Rolling walker   Cognition   Overall Cognitive Status WFL   Arousal/Participation Alert; Responsive; Cooperative   Attention Within functional limits   Orientation Level Oriented X4   Memory Within functional limits   Following Commands Follows one step commands without difficulty   Comments Pt pleasant and cooperative t/o session  Pt does express some frustration with slight dysarthria    Activity Tolerance   Activity Tolerance Patient limited by fatigue;Patient limited by pain   Medical Staff Made Aware RN clearance for session    Assessment   Assessment Patient participated in Skilled OT session this date with interventions consisting of ADL re training with the use of correct body mechnaics, safety awareness and fall prevention techniques,  therapeutic activities to: increase activity tolerance and increase OOB/ sitting tolerance   Upon arrival patient was found supine in bed  Pt demonstrated the following tasks: S sup to sit, S STS, CGA fnx ambulation with RW  Pt requires S for UBD,  CGA for LBD to maintain stance when pulling over hips  Utilizes cross-legged technique to don socks and underwear - does require increased time to complete tasks  Pt does c/o of HA, sensitivity to light, as well as increased fatigue  Patient continues to be functioning below baseline level, occupational performance remains limited secondary to factors listed above and increased risk for falls and injury  From OT standpoint, recommendation at time of d/c would be Short Term Rehab vs home with OPOT and increased support - pending progress and formal cognitive evaluation  Patient to benefit from continued Occupational Therapy treatment while in the hospital to address deficits as defined above and maximize level of functional independence with ADLs and functional mobility  Pt was left in chair with alarm on after session with all current needs met   The patient's raw score on the AM-PAC Daily Activity inpatient short form is 19, standardized score is 40 22, greater than 39 4  Patients at this level are likely to benefit from discharge to home  Please refer to the recommendation of the Occupational Therapist for safe discharge planning  Plan   Treatment Interventions ADL retraining;Visual perceptual retraining;Functional transfer training;UE strengthening/ROM; Endurance training;Patient/family training;Equipment evaluation/education; Compensatory technique education;Continued evaluation; Energy conservation; Activityengagement   Goal Expiration Date 05/27/21   OT Treatment Day 1   OT Frequency 3-5x/wk   Recommendation   OT Discharge Recommendation Post acute rehabilitation services vs home with OPOT and increased social support - pending progress and formal cognitive evaluation   OT - OK to Discharge Yes  (yes to rehab, no to home)   AM-PAC Daily Activity Inpatient   Lower Body Dressing 3   Bathing 3   Toileting 3   Upper Body Dressing 3   Grooming 3   Eating 4   Daily Activity Raw Score 19   Daily Activity Standardized Score (Calc for Raw Score >=11) 40 22   AM-PAC Applied Cognition Inpatient   Following a Speech/Presentation 3   Understanding Ordinary Conversation 4   Taking Medications 4   Remembering Where Things Are Placed or Put Away 4   Remembering List of 4-5 Errands 4   Taking Care of Complicated Tasks 3   Applied Cognition Raw Score 22   Applied Cognition Standardized Score 47 83     Phyllis Gallagher MS, OTR/L

## 2021-05-15 NOTE — ASSESSMENT & PLAN NOTE
Patient developed this postop, now resolved  · Lactic acid trended down from 6 7 to normal at 1 7 on 5/11/21    · IVFs DC'd

## 2021-05-15 NOTE — CASE MANAGEMENT
CM met with pt and spouse to discuss dcp  They felt pt did better with therapy session today  Pt is not refusing inpt therapy but would prefer to have therapy at home or outpt if able to  Spouse states she will take time off work to be home with pt so she has 24 hour support  Cm to follow-up with OT on cog assessment

## 2021-05-15 NOTE — PHYSICAL THERAPY NOTE
Physical Therapy Progress Note     05/15/21 1425   Note Type   Note Type Treatment for insurance authorization   Pain Assessment   Pain Assessment Tool FLACC   Pain Rating: FLACC (Rest) - Face 1   Pain Rating: FLACC (Rest) - Legs 0   Pain Rating: FLACC (Rest) - Activity 0   Pain Rating: FLACC (Rest) - Cry 1   Pain Rating: FLACC (Rest) - Consolability 0   Score: FLACC (Rest) 2   Pain Rating: FLACC (Activity) - Face 1   Pain Rating: FLACC (Activity) - Legs 0   Pain Rating: FLACC (Activity) - Activity 0   Pain Rating: FLACC (Activity) - Cry 1   Pain Rating: FLACC (Activity) - Consolability 0   Score: FLACC (Activity) 2   Restrictions/Precautions   Other Precautions Pain; Fall Risk;Cognitive;Visual impairment  (diplopia, used eye patch over L eye during session)   Subjective   Subjective Pt encoutered supine in bed with wife present  Reports having headache & nausea prior to session, but feels better after medication  Reports feeling better overall in recent days  Wife says pt is returning closer to baseline  Pt & wife both had questions & concerns regarding discharge plan to rehab given pt's condition & history of depression, but felt more comfortable with situation after session  Bed Mobility   Supine to Sit 5  Supervision   Additional items Assist x 1;HOB elevated   Transfers   Sit to Stand 5  Supervision   Additional items Assist x 1; Armrests; Increased time required   Stand to Sit 5  Supervision   Additional items Assist x 1; Armrests; Increased time required   Ambulation/Elevation   Gait pattern Excessively slow; Step to;Short stride;Decreased L stance;Decreased foot clearance; Improper Weight shift; Shuffling   Gait Assistance 5  Supervision   Additional items Assist x 1   Assistive Device Rolling walker   Distance 40', 120', 160'   Stair Management Assistance 4  Minimal assist   Additional items Assist x 1   Stair Management Technique One rail L;Step to pattern; Foreward   Number of Stairs 3   Balance   Static Sitting Fair +   Static Standing Fair   Ambulatory Poor +   Endurance Deficit   Endurance Deficit Yes   Endurance Deficit Description fatigue   Activity Tolerance   Activity Tolerance Patient tolerated treatment well;Patient limited by fatigue   Assessment   Prognosis Fair   Problem List Decreased strength;Decreased endurance; Impaired balance;Decreased mobility; Decreased coordination;Decreased safety awareness;Pain   Assessment Pt demonstrated improvd mobility this sesison, performing all transfers without need for physical assist, and ambulated on levels with use of RW with assist only provided when pt demonstrated 1 minor LOB when attempting to perform dyanmic head & RUE movement to point  Pt able to recover with RW at that time  Pt navigated obstacles without significant difficulty depsite use of eye patch and performed dynamic turns & approached chair at conclusion of session without LOB  Did require instructions for RW proximity & management to maintain safety just prior to sitting, with pt & wife reporting understanding  Pt negotiated steps as noted above without incident & no complaints offered despite perofrming dynamic head movements to perform task successfully  Pt declined seated rests during session & only offers general fatigue at this time  At conclusion of ambulatory tasks, extensive discussion held with pt & wife regarding POC and discharge recommendations  Discussed role of rehab, pt's current function, progress & need for further recovery to return to baseline  Pt & spouse verbalized understanding of all discussions after & offer no further questions or concerns  Further discussion held with OTR post session regarding pt's cognitive status & ADL managment  At this time, recommendation is for inpatient rehab vs home with outpatient neuro PT pending further cognitive assessment & family's ability to provide increase in requisite social support upon discharge     Barriers to Discharge Decreased caregiver support   Goals   Patient Goals to get better & go home   STG Expiration Date 05/25/21   Plan   Treatment/Interventions Functional transfer training;LE strengthening/ROM; Elevations; Therapeutic exercise; Endurance training;Patient/family training;Equipment eval/education; Bed mobility;Gait training;OT   Progress Progressing toward goals   PT Frequency Other (Comment)  (3-5x/week)   Recommendation   PT Discharge Recommendation   (rehab vs home with OP neuro rhb pending support, cog assess)   Equipment Recommended [facility] Recommended HD Bariatric wheeled walker   PT - OK to Discharge Yes  (see assessment)   AM-PAC Basic Mobility Inpatient   Turning in Bed Without Bedrails 4   Lying on Back to Sitting on Edge of Flat Bed 4   Moving Bed to Chair 3   Standing Up From Chair 4   Walk in Room 4   Climb 3-5 Stairs 3   Basic Mobility Inpatient Raw Score 22   Basic Mobility Standardized Score 47 4   The patient's AM-PAC Basic Mobility Inpatient Short Form Raw Score is 22, Standardized Score is 47 4  A standardized score less than 42 9 suggests the patient may benefit from discharge to post-acute rehabilitation services  Please also refer to the recommendation of the Physical Therapist for safe discharge planning            Paris Lamas PTA

## 2021-05-16 VITALS
OXYGEN SATURATION: 90 % | HEIGHT: 64 IN | TEMPERATURE: 98.1 F | BODY MASS INDEX: 50.02 KG/M2 | WEIGHT: 293 LBS | DIASTOLIC BLOOD PRESSURE: 78 MMHG | RESPIRATION RATE: 18 BRPM | HEART RATE: 48 BPM | SYSTOLIC BLOOD PRESSURE: 144 MMHG

## 2021-05-16 PROBLEM — E87.2 LACTIC ACIDOSIS: Status: RESOLVED | Noted: 2021-05-11 | Resolved: 2021-05-16

## 2021-05-16 PROBLEM — E87.20 LACTIC ACIDOSIS: Status: RESOLVED | Noted: 2021-05-11 | Resolved: 2021-05-16

## 2021-05-16 PROCEDURE — 97129 THER IVNTJ 1ST 15 MIN: CPT

## 2021-05-16 PROCEDURE — 99024 POSTOP FOLLOW-UP VISIT: CPT | Performed by: PHYSICIAN ASSISTANT

## 2021-05-16 PROCEDURE — 97130 THER IVNTJ EA ADDL 15 MIN: CPT

## 2021-05-16 PROCEDURE — NC001 PR NO CHARGE: Performed by: PHYSICIAN ASSISTANT

## 2021-05-16 RX ORDER — OXYCODONE HYDROCHLORIDE 5 MG/1
5-10 TABLET ORAL EVERY 6 HOURS PRN
Qty: 30 TABLET | Refills: 0 | Status: SHIPPED | OUTPATIENT
Start: 2021-05-16 | End: 2021-05-23 | Stop reason: SDUPTHER

## 2021-05-16 RX ORDER — LEVETIRACETAM 750 MG/1
750 TABLET ORAL EVERY 12 HOURS SCHEDULED
Qty: 2 TABLET | Refills: 0 | Status: SHIPPED | OUTPATIENT
Start: 2021-05-16 | End: 2021-05-25

## 2021-05-16 RX ORDER — DEXAMETHASONE 2 MG/1
TABLET ORAL
Qty: 30 TABLET | Refills: 0 | Status: SHIPPED | OUTPATIENT
Start: 2021-05-16 | End: 2021-05-28

## 2021-05-16 RX ORDER — METHOCARBAMOL 750 MG/1
750 TABLET, FILM COATED ORAL EVERY 6 HOURS PRN
Qty: 30 TABLET | Refills: 0 | Status: SHIPPED | OUTPATIENT
Start: 2021-05-16 | End: 2021-05-24 | Stop reason: SDUPTHER

## 2021-05-16 RX ORDER — AMOXICILLIN 250 MG
2 CAPSULE ORAL DAILY PRN
Qty: 30 TABLET | Refills: 0 | Status: SHIPPED | OUTPATIENT
Start: 2021-05-16 | End: 2021-05-23 | Stop reason: SDUPTHER

## 2021-05-16 RX ADMIN — ACETAMINOPHEN 975 MG: 325 TABLET, FILM COATED ORAL at 13:12

## 2021-05-16 RX ADMIN — SERTRALINE HYDROCHLORIDE 100 MG: 100 TABLET ORAL at 10:01

## 2021-05-16 RX ADMIN — ACETAMINOPHEN 975 MG: 325 TABLET, FILM COATED ORAL at 05:01

## 2021-05-16 RX ADMIN — DEXAMETHASONE SODIUM PHOSPHATE 4 MG: 4 INJECTION INTRA-ARTICULAR; INTRALESIONAL; INTRAMUSCULAR; INTRAVENOUS; SOFT TISSUE at 03:00

## 2021-05-16 RX ADMIN — METHOCARBAMOL TABLETS 750 MG: 750 TABLET, COATED ORAL at 13:12

## 2021-05-16 RX ADMIN — METHOCARBAMOL TABLETS 750 MG: 750 TABLET, COATED ORAL at 05:01

## 2021-05-16 RX ADMIN — OXYCODONE HYDROCHLORIDE 10 MG: 10 TABLET ORAL at 00:38

## 2021-05-16 RX ADMIN — OXYCODONE HYDROCHLORIDE 10 MG: 10 TABLET ORAL at 07:18

## 2021-05-16 RX ADMIN — HEPARIN SODIUM 5000 UNITS: 5000 INJECTION INTRAVENOUS; SUBCUTANEOUS at 05:01

## 2021-05-16 RX ADMIN — FAMOTIDINE 40 MG: 20 TABLET ORAL at 10:01

## 2021-05-16 RX ADMIN — DEXAMETHASONE SODIUM PHOSPHATE 4 MG: 4 INJECTION INTRA-ARTICULAR; INTRALESIONAL; INTRAMUSCULAR; INTRAVENOUS; SOFT TISSUE at 10:01

## 2021-05-16 RX ADMIN — BISMUTH SUBSALICYLATE 524 MG: 525 LIQUID ORAL at 13:33

## 2021-05-16 RX ADMIN — DOCUSATE SODIUM AND SENNOSIDES 2 TABLET: 8.6; 5 TABLET ORAL at 10:01

## 2021-05-16 RX ADMIN — LEVETIRACETAM 750 MG: 750 TABLET ORAL at 10:01

## 2021-05-16 RX ADMIN — POLYETHYLENE GLYCOL 3350 17 G: 17 POWDER, FOR SOLUTION ORAL at 10:01

## 2021-05-16 RX ADMIN — Medication 1 TABLET: at 10:01

## 2021-05-16 NOTE — PROGRESS NOTES
[facility]  Progress Note - Onofre Click 1980, 41 y o  female MRN: [id number]  Unit/Bed#: [facility] 584-73 Encounter: [id number]  Primary Care Provider: Binta Sepulveda MD   Date and time admitted to hospital: 5/10/2021  5:37 AM    * Cerebellopontine angle tumor [facility])  Assessment & Plan  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)  · Pre op complains of pain behind her left eye, headaches, and occasionally diplopia  She also had numbness and tingling bilaterally in her arms, RUE weakness  and developed some lightheadedness with balance difficulties  · Pathology pending    Imaging:  · CT head wo contrast 5/12/2021:  Postoperative changes with resection of left cerebellopontine angle mass  There is resolving air in the postoperative bed  Slight increase in the overall size of the fluid and blood products within the postoperative bed  Slight decrease in the size of the intraparenchymal hemorrhage in the anterior, left lateral aspect of the lauren  Stable small bilateral supratentorial subdural hemorrhages  · MRI brain w/wo 5/11/21: 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  There is residual tumor identified as seen on series 10 image 7 and series 1100, image 25  There is edema and hemorrhage within the left lateral aspect of the lauren as well as a smaller focus of hemorrhage within the posterior central aspect of the brain  Mild associated edema  There is a small amount of acute subdural blood seen posteriorly within both hemispheres measuring approximately 2 mm in thickness  There is mild reactive dural thickening and enhancement seen diffusely  Plan:  · Continue frequent neurological checks   Strength intact, sensation to LT improving in left hand, continues with slowed speech but feels tongue swelling is improving, continues with left eye issues as she is unable to move eye medially however double vision is subjectively improving  · STAT CT head with any neurological decline including drop GCS of 2pts within 1 hr  · Pain control -   · Continue Tylenol 975 mg q 8 hours scheduled  · Oxycodone 5 mg and 10 mg every 4 hours as needed for moderate and severe pain, respectively  · Bowel regimen - no BM since surgery, patient feels constipated  · Senokot S, miralax and suppository ordered  · Had BM x 2 over the last 2 days, continue to monitor  · Recommend SBP < 160mmHg  · Keppra 750mg Q 12H x 7 days for seziure ppx  · Continue q 72 hour Decadron taper  · PT/OT: initially recommended rehab but pt was motivated to go home  Pt re-examined today by OT, was deemed appropriate to d/c to home with family support  · Speech eval, cleared patient for full liquids and thins  · DVT PPX: SCDs and heparin SQ  · Completed nursing rounds with Ye Bobby RN  · Tentative plan is for pt to d/c to home today  Pt will have 2 week f/u w/nsx for pathology review  Please call with questions or concerns  Subjective/Objective     Chief Complaint: 'I did not sleep well last night"    Subjective:  Patient reported that she did not sleep well last night  Patient reported that she was getting headaches  This morning patient rates the headache as 5/10 on the pain scale  Patient reports that had double vision on the right seems like it is improving  Patient denies any worsening in her vision  Patient reports intermittent dizziness  Patient denies any new numbness, tingling, weakness in bilateral arms or legs  Patient denies any chest pain, shortness of breath, nausea, vomiting, or abdominal pain  Per nursing report, patient had a bowel movement yesterday and the day prior  Objective:  Drowsy, lying in bed, no acute distress    I/O       05/14 0701 - 05/15 0700 05/15 0701 - 05/16 0700 05/16 0701 - 05/17 0700    P  O  1440 1181     I V  (mL/kg)       IV Piggyback       Total Intake(mL/kg) 1440 (10 6) 1181 (8 6)     Urine (mL/kg/hr) 400 (0 1)      Stool 0      Total Output 400      Net +1040 +1181            Unmeasured Urine Occurrence 7 x 6 x     Unmeasured Stool Occurrence 2 x 2 x           Invasive Devices     Peripheral Intravenous Line            Peripheral IV 05/16/21 Right Hand less than 1 day                Physical Exam:  Vitals: Blood pressure 144/78, pulse (!) 48, temperature 98 1 °F (36 7 °C), resp  rate 18, height 5' 4" (1 626 m), weight (!) 137 kg (302 lb), SpO2 90 %  ,Body mass index is 51 84 kg/m²  General appearance:  Appears stated age  Head: Normocephalic, without obvious abnormality, atraumatic  Eyes: EOMI except left eye has limited movement medially - stable  Neck: supple, symmetrical, trachea midline   Lungs: non labored breathing  Heart: regular heart rate  Neurologic:   Mental status:  Drowsy, oriented, thought content appropriate  Cranial nerves: grossly intact (Cranial nerves II-XII)  Sensory:  Decreased sensation in left hand otherwise normal in all other extremities  Motor: moving all extremities, strength 5/5 throughout  Coordination: no drift in bilateral upper extremities      Lab Results:  Results from last 7 days   Lab Units 05/14/21  0532 05/11/21  0450 05/10/21  1957  05/10/21  1746   WBC Thousand/uL 9 68 14 44* 11 45*   < >  --    HEMOGLOBIN g/dL 12 6 12 6 13 1   < >  --    HEMATOCRIT % 37 4 36 7 39 6   < >  --    PLATELETS Thousands/uL 209 181 188   < >  --    NEUTROS PCT %  --   --  90*  --   --    MONOS PCT %  --   --  4  --   --    MONO PCT %  --   --   --   --  1*    < > = values in this interval not displayed       Results from last 7 days   Lab Units 05/14/21  0532 05/13/21  0435 05/12/21  0512 05/11/21 0451 05/11/21  0002 05/10/21  1958 05/10/21  1722  05/10/21  1604 05/10/21  1503   POTASSIUM mmol/L 3 8 4 0 4 2 3 8 4 0 4 5  --    < >  --   --    CHLORIDE mmol/L 107 107 109* 111* 110* 113*  --    < >  --   --    CO2 mmol/L 26 26 26 24 23 17*  --    < > --   --    CO2, I-STAT mmol/L  --   --   --   --   --   --  20*  --  17* 17*   BUN mg/dL 14 15 14 5 5 4*  --    < >  --   --    CREATININE mg/dL 0 57* 0 62 0 66 0 70 0 74 0 98  --    < >  --   --    CALCIUM mg/dL 8 6 8 8 8 9 8 9 9 2 9 0  --    < >  --   --    ALK PHOS U/L  --   --   --  57 58 60  --   --   --   --    ALT U/L  --   --   --  202* 236* 257*  --   --   --   --    AST U/L  --   --   --  166* 192* 206*  --   --   --   --    GLUCOSE, ISTAT mg/dl  --   --   --   --   --   --  139  --  156* 153*    < > = values in this interval not displayed  Results from last 7 days   Lab Units 05/12/21  0512 05/11/21  0451 05/10/21  1958   MAGNESIUM mg/dL 2 5 2 0 1 9     Results from last 7 days   Lab Units 05/12/21  0512 05/11/21  0451 05/10/21  1958   PHOSPHORUS mg/dL 3 8 3 4 2 7     Results from last 7 days   Lab Units 05/11/21  0452 05/10/21  1958   INR  1 08 1 05   PTT seconds 26  --      No results found for: TROPONINT  ABG:  Lab Results   Component Value Date    PHART 7 419 05/11/2021    TYO2MKR 40 6 05/11/2021    PO2ART 58 2 (LL) 05/11/2021    JXM7KHY 25 7 05/11/2021    BEART 1 1 05/11/2021    SOURCE Line, Arterial 05/11/2021       Imaging Studies: I have personally reviewed pertinent reports and I have personally reviewed pertinent films in PACS    EKG, Pathology, and Other Studies: I have personally reviewed pertinent reports  VTE Pharmacologic Prophylaxis: Heparin    VTE Mechanical Prophylaxis: sequential compression device    PLEASE NOTE:  This encounter may have been completed utilizing the appssavvy/[employer] Direct Speech Voice Recognition Software  Grammatical errors, random word insertions, pronoun errors and incomplete sentences are occasional consequences of the system due to software limitations, ambient noise and hardware issues  These may be missed by proof reading prior to affixing electronic signature   Any questions or concerns about the content, text or information contained within the body of this dictation should be directly addressed to the advanced practitioner or physician for clarification

## 2021-05-16 NOTE — OCCUPATIONAL THERAPY NOTE
[address] Progress Note     Patient Name: Thai Carranza  Today's Date: 5/16/2021  Problem List  Principal Problem:    Cerebellopontine angle tumor ([facility] [address] )  Active Problems:    Depression    Gastroesophageal reflux disease    Morbid obesity ([facility] [address] )    Lactic acidosis          05/16/21 1104   OT Last Visit   OT Visit Date 05/16/21   Note Type   Note Type Treatment   Restrictions/Precautions   Weight Bearing Precautions Per Order No   Other Precautions Fall Risk;Pain;Cognitive;Visual impairment  (diplopia - has eye patch )   General   Response to Previous Treatment Patient with no complaints from previous session   Lifestyle   Autonomy I ADLS/IADLS, transfers and functional mobility PTA   Reciprocal Relationships Pt lives w/ her spouse who works during the day, has 3 adult kids who are out of the house   Service to Others Works full time as a  at [employer]"   [place] [address] [employer] [employer]   Pain Assessment   Pain Assessment Tool 0-10   Pain Score 8   Pain Location/Orientation Location: Head   Pain Onset/Description Onset: Ongoing   Hospital Pain Intervention(s) Repositioned; Ambulation/increased activity   ADL   Toileting Assistance  5  Supervision/Setup   Toileting Deficit Increased time to complete   Bed Mobility   Supine to Sit 5  Supervision   Additional items HOB elevated; Increased time required   Sit to Supine 5  Supervision   Additional items HOB elevated; Increased time required   Transfers   Sit to Stand 5  Supervision   Additional items Assist x 1; Increased time required   Stand to Sit 5  Supervision   Additional items Assist x 1; Increased time required   Toilet transfer 5  Supervision   Additional items Assist x 1; Increased time required;Standard toilet   Additional Comments Transfers with RW   Functional Mobility   Functional Mobility 5  Supervision   Additional Comments [facility]   Additional items Rolling walker   Tub Transfers   Tub Transfers Comments Attempted simulated tub transfer - pt requires MOD A  Educated on use of GB and obtaining tub transfer bench, as well as S from wife    Cognition   Overall Cognitive Status WFL   Arousal/Participation Alert; Responsive; Cooperative   Attention Within functional limits   Orientation Level Oriented X4   Memory Within functional limits   Following Commands Follows one step commands without difficulty   Comments Pleasant and cooperative t/o session   Cognition Assessment Tools ACLS   Score 5   Activity Tolerance   Activity Tolerance Patient limited by pain  (+ nausea )   Medical Staff Made Aware RN clearance for session, spoke with CM    Assessment   Assessment Pt seen for formal cognitive evaluation  Upon arrival pt in bathroom  S for toileting, ambulates to bed with S and RW  S STS and sit to supine  Pt scored 5 0 on ACLS indicating that pt is ok to return home with checks to monitor safety and problem solving  Pt also appropriately answers safety questions  Discussed safety plan for home - reports her wife will be home with her for at least 1 week upon d/c  Also reports friends who live nearby that can assist as needed  Also practiced simulated tub transfer  Requires MOD A, decreased balance  Discussed obtaining tub transfer bench with pt for safety - verbalizes good understanding  Educated on how to complete bench transfer, as well as having wife present to S safety during transfer  Patient continues to be functioning below baseline level, occupational performance remains limited secondary to factors listed above and increased risk for falls and injury  From OT standpoint, recommendation at time of d/c would be home with OPOT and increased social support  Patient to benefit from continued Occupational Therapy treatment while in the hospital to address deficits as defined above and maximize level of functional independence with ADLs and functional mobility  Pt was left in bed after session with all current needs met   The patient's raw score on the [facility] Daily Activity inpatient short form is 19, standardized score is 40 22, greater than 39 4  Patients at this level are likely to benefit from discharge to home  Please refer to the recommendation of the Occupational Therapist for safe discharge planning  Plan   Treatment Interventions ADL retraining;Visual perceptual retraining;Functional transfer training;UE strengthening/ROM; Endurance training;Cognitive reorientation;Equipment evaluation/education;Patient/family training; Compensatory technique education;Continued evaluation; Energy conservation; Activityengagement   Goal Expiration Date 05/27/21   OT Treatment Day 2   OT Frequency 3-5x/wk   Recommendation   OT Discharge Recommendation Home with outpatient rehabilitation  (OPOT and increased social support)   Equipment Recommended Shower/Tub chair with back ($)   OT - OK to Discharge Yes  (when medically stable )   [facility] Daily Activity Inpatient   Lower Body Dressing 3   Bathing 3   Toileting 3   Upper Body Dressing 3   Grooming 3   Eating 4   Daily Activity Raw Score 19   Daily Activity Standardized Score (Calc for Raw Score >=11) 40 22   [facility] Applied Cognition Inpatient   Following a Speech/Presentation 3   Understanding Ordinary Conversation 4   Taking Medications 4   Remembering Where Things Are Placed or Put Away 4   Remembering List of 4-5 Errands 4   Taking Care of Complicated Tasks 3   Applied Cognition Raw Score 22   Applied Cognition Standardized Score 47 83     5 0    Administered Mao Cognitive Level Screen (ACLS)  The patient scored 5 0/6 0 indicating that they may live alone with weekly checks to monitor safety and check problem solving effectiveness  Behavior:  Stops working to talk  Questions need to do activity  Knows need to keep scheduled appointments however may be apt for forget or miss appointments  Tangential   Manipulative  May blame others when mistakes are made  May argue with suggestions    May be inconsistent with carryover  May resist attempts to provide assistance  Inflexible  May insist upon using an inefficient way of doing things  Conversations may be concrete and self centered  Abstract reasoning may not be understood  Grooming: Completes grooming activity independently  May forget newly learned techniques  May fail to anticipate need to purchase grooming supplies  May not read directions before using new products  Dressing:  May not consider social standards  May argue with suggestions to change selections  Bathing:  Completes bathing routine independently  May solve problems  May fail to anticipate secondary effects of new products like allergies  Walking/Exercising:  May forget newly discovered routes and may have to rediscover them  May refuse to comply with exercise program   Eating:  Eats and talks with others  May not see crumbs or small spills  May need help to comply with dietary restrictions  Toileting:  Independently performs all toileting  Can explore a new environment to locate a bathroom without assistance  May not anticipate needs for new toileting solutions  Medications:  Opens new containers  May understand medication schedules but has trouble taking them on time  May choose to discontinue medications  May recognize side effects like tremors or sedation  Utilize daily pill box  Use of adaptive equipment:  May choose not to use equipment  May utilize in unsafe manner  Housekeeping:  Able to learn new sequence of actions  Does not change or alter pace  May set and follow a weekly schedule  Identifies potential hazards posed by electric, gas, toxins, poisons or improper storage/disposal of items  Food preparation:  May make reservations to eat out but may fail to remember and arrive late  Passes heavy serving dishes safely  May forget discovered solutions to problems    Spending money:  May be able to identify monthly budget of routine expenses but may have trouble adhering to it  May run up bills or overextend credit  May have trouble balancing checkbook  Does not plan for long term financial security  Shopping:  May make daily or weekly schedule of shopping needs with trouble adhering to it  May be impulsive in spending  May discriminate between stores, labels and brands  Laundry:  May set and follow weekly schedule with difficulty adhering to it  Learns new sequence of actions  Uses iron effectively  Traveling: Can make a schedule for traveling to new locations but has difficulty adhering to it  May miss bus  May need assistance to read and understand new schedules  Telephone:  Learns new telephone procedures by initiating several steps at a time  May look up new numbers  May not use yellow pages  May become confused by new options or ignore call waiting  May run up large phone bills  Driving:  Should NOT operate a motor vehicle           Jorge Stiles MS, OTR/L

## 2021-05-16 NOTE — DISCHARGE SUMMARY
Discharge Summary - Neurosurgery   Jin Cervantes 41 y o  female MRN: [id number]  Unit/Bed#: [facility] 054-36 Encounter: [id number]    Admission Date:   Admission Orders (From admission, onward)     Ordered        05/10/21 1933  Inpatient Admission  Once                      Discharge Date: 5/16/21    Admitting Diagnosis: Cerebellopontine angle tumor [facility]) [D33 3]       Resolved Problems  Date Reviewed: 4/15/2021          Resolved    Lactic acidosis 5/16/2021     Resolved by  Tony Forrester PA-C        Discharge Diagnosis:   Cerebellopontine angle tumor ([address] )  Assessment & Plan on the day of discharge  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)  · Pre op complains of pain behind her left eye, headaches, and occasionally diplopia   She also had numbness and tingling bilaterally in her arms, RUE weakness  and developed some lightheadedness with balance difficulties  · Pathology pending     Imaging:  · CT head wo contrast 5/12/2021:  Postoperative changes with resection of left cerebellopontine angle mass  Ami Reap is resolving air in the postoperative bed   Slight increase in the overall size of the fluid and blood products within the postoperative bed  Slight decrease in the size of the intraparenchymal hemorrhage in the anterior, left lateral aspect of the lauren   Stable small bilateral supratentorial subdural hemorrhages  · MRI brain w/wo 5/11/21: 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  There is residual tumor identified as seen on series 10 image 7 and series 1100, image 25  There is edema and hemorrhage within the left lateral aspect of the lauren as well as a smaller focus of hemorrhage within the posterior central aspect of the brain   Mild associated edema  There is a small amount of acute subdural blood seen posteriorly within both hemispheres measuring approximately 2 mm in thickness   There is mild reactive dural thickening and enhancement seen diffusely      Plan:  · Continue frequent neurological checks  Exam: Strength intact, sensation to LT improving in left hand, continues with slowed speech but feels tongue swelling is improving, continues with left eye issues as she is unable to move left eye medially however double vision is subjectively improving  · STAT CT head with any neurological decline including drop GCS of 2pts within 1 hr  · Pain control -   ? Continue Tylenol 975 mg q 8 hours scheduled  ? Oxycodone 5 mg and 10 mg every 4 hours as needed for moderate and severe pain, respectively  ? Bowel regimen  ? Senokot S, miralax and suppository ordered  ? Had BM x 2 over the last 2 days  · Recommend SBP < 160mmHg  · Keppra 750mg Q 12H x 7 days for seziure ppx  · Continue q 72 hour Decadron taper  · PT/OT: initially recommended rehab but pt was motivated to go home  Pt re-examined by OT, was deemed appropriate to d/c to home with family support and outpatient OT  · Speech eval, cleared patient for full liquids and thins  · DVT PPX: SCDs and heparin SQ  · Pt will have 2 week f/u w/nsx for pathology review  Please call with questions or concerns  Attending: Dr Mortimer Rush  Procedures Performed: Left 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     Pathology: Tissue sent to pathology, results pending  Hospital Course: Pt is a 41year old female who had a visit with neurosurgery due to complaints of vertigo, pain behind left eye, headaches and diplopia  Pt had an MRI brain w wo 4/12/21 that showed a calcified mass in the left CP angle with some mass effect on the left lauren with extension in to the cavernous sinus  Pt was deemed an appropriate candidate for surgery for Craniotomy for mass resection  On 5/10/21 pt was admitted for surgery   Patient underwent left 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 under general anesthesia with minimal blood loss and no complications  Patient was kept in the PACU until stable and then moved to the floor  Post operatively pt reported headaches, intermittent diplopia when looking right and left eye was noted to have limited movement medially, otherwise pt was moving all extremities with full strength, sensation was decreased in left hand otherwise intact in all other extremities  Patient received an MRI brain postoperatively which revealed expected post op changes with partial resection of the left CP angle mass with residual mass noted  There was edema and small hemorrhage in the lateral aspect of the lauren and posterior central aspect of the brain  PT and OT were consulted, they evaluated patient and recommend discharge to home with OPOT  Patient was neurologically and medically stable for discharge  Prior to discharge, postoperative instructions were discussed with patient  During that time, all questions and concerns were addressed  Patient will follow up outpatient in 2 weeks for an incision check and pathology discussion with Dr Elyssa Guadarrama  Condition at Discharge: stable     Discharge instructions/Information to patient and family:   See after visit summary for information provided to patient and family  Provisions for Follow-Up Care:  See after visit summary for information related to follow-up care and any pertinent home health orders  Disposition: Home      Planned Readmission: No    Discharge Statement   I spent 25 minutes discharging the patient  This time was spent on the day of discharge  I had direct contact with the patient on the day of discharge  Additional documentation is required if more than 30 minutes were spent on discharge  Discharge Medications:  See after visit summary for reconciled discharge medications provided to patient and family

## 2021-05-16 NOTE — DISCHARGE INSTRUCTIONS
Discharge Instructions  Craniotomy for tumor resection     Activity:  1  Do not lift, push or pull more than 10 pounds for 2 weeks  2  Avoid bending, lifting and twisting for 2 weeks  No running  No athletic activities until cleared  3  No driving for at least 2 weeks or until cleared by Neurosurgery  4  When able to shower, continue to use clean towel and washcloth for 2 weeks post-op  5  Do not use a hair dryer, and avoid hair products such as mousse, oils, and gels  Do not brush your hair away from the incision since this will put strain on the suture line  6  Do not dye or perm hair for 6 weeks or until cleared by physician  7  Continue to change bed linens and pajamas more frequently  Wear clean clothes daily  8  May walk as tolerated  Recommend 4 short walks daily  Surgical incision care:  1  Keep dressings in place for 3 days  After 3 days, incisions may be left open to air, but should remain clean  2  Keep incisions dry for 3 days  3  May shower after 3 days using a baby shampoo including head incision  Rinse off shampoo and pat dry  4  Avoid rubbing the incision but gently massage hair  5  Do not immerse the incisions in water for 6 weeks  6  Staples/suture will be removed at your 2 week postoperative visit  7  Do not apply any creams or ointments to the incision, unless otherwise instructed by [facility]  8  Contact office if increasing redness, drainage, pain or swelling occurs around the incisions or if you develop a fever greater than 101F  9  Do not dye/perm hair or use any hair products until cleared by Neurosurgery  Postoperative medication:  1  [facility] will provide pain medication in the postoperative period  All prescriptions must come from a single practice  a  Take medications as prescribed  Call office with any questions/concerns  b  May use over the counter Tylenol  No NSAIDs (ie   Ibuprofen, Aleve, Advil, Naproxen)  2  Please contact office for questions regarding dosage and modifications  3  No antiplatelet or anticoagulation medication (ie  Coumadin, Aspirin, Plavix) until cleared by [employer], unless otherwise instructed  Please contact [facility] if you have any questions about the effects of any of your medications on blood clotting  4  Do not operate heavy machinery or vehicles while taking sedating medications  5  Use a bowel regimen while on opioids as they induce constipation  Ie  Senokot-S, Miralax, Colace, etc  Increase fiber and water intake  Follow-up as scheduled for a 2 week post-operative visit for an incision check and final pathology  ** Please notify the office if incision becomes red, swollen, tender, or has increased drainage, and temp>101  Return to the ER if you experience increased headache, drowsiness, weakness, nausea/vomiting, or seizures  **

## 2021-05-16 NOTE — ASSESSMENT & PLAN NOTE
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)  · Pre op complains of pain behind her left eye, headaches, and occasionally diplopia  She also had numbness and tingling bilaterally in her arms, RUE weakness  and developed some lightheadedness with balance difficulties  · Pathology pending    Imaging:  · CT head wo contrast 5/12/2021:  Postoperative changes with resection of left cerebellopontine angle mass  There is resolving air in the postoperative bed  Slight increase in the overall size of the fluid and blood products within the postoperative bed  Slight decrease in the size of the intraparenchymal hemorrhage in the anterior, left lateral aspect of the lauren  Stable small bilateral supratentorial subdural hemorrhages  · MRI brain w/wo 5/11/21: 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  There is residual tumor identified as seen on series 10 image 7 and series 1100, image 25  There is edema and hemorrhage within the left lateral aspect of the lauren as well as a smaller focus of hemorrhage within the posterior central aspect of the brain  Mild associated edema  There is a small amount of acute subdural blood seen posteriorly within both hemispheres measuring approximately 2 mm in thickness  There is mild reactive dural thickening and enhancement seen diffusely  Plan:  · Continue frequent neurological checks   Strength intact, sensation to LT improving in left hand, continues with slowed speech but feels tongue swelling is improving, continues with left eye issues as she is unable to move eye medially however double vision is subjectively improving  · STAT CT head with any neurological decline including drop GCS of 2pts within 1 hr  · Pain control -   · Continue Tylenol 975 mg q 8 hours scheduled  · Oxycodone 5 mg and 10 mg every 4 hours as needed for moderate and severe pain, respectively  · Bowel regimen - no BM since surgery, patient feels constipated  · Senokot S, miralax and suppository ordered  · Had BM x 2 over the last 2 days, continue to monitor  · Recommend SBP < 160mmHg  · Keppra 750mg Q 12H x 7 days for seziure ppx  · Continue q 72 hour Decadron taper  · Mobilize with PT/OT, recommending post acute rehab  Case Management to discuss placement options  Consult to PMR was placed  Tentative plan is to dispo to [facility] vs [facility]  · Speech eval, cleared patient for full liquids and thins  · DVT PPX: SCDs and heparin SQ  · Completed nursing rounds with Ye Bobby RN  Neurosurgery will continue management as primary team   Patient was medically stable for discharge on 05/14/2021, pending placement to rehab  Please call with questions or concerns

## 2021-05-16 NOTE — ASSESSMENT & PLAN NOTE
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)  · Pre op complains of pain behind her left eye, headaches, and occasionally diplopia  She also had numbness and tingling bilaterally in her arms, RUE weakness  and developed some lightheadedness with balance difficulties  · Pathology pending    Imaging:  · CT head wo contrast 5/12/2021:  Postoperative changes with resection of left cerebellopontine angle mass  There is resolving air in the postoperative bed  Slight increase in the overall size of the fluid and blood products within the postoperative bed  Slight decrease in the size of the intraparenchymal hemorrhage in the anterior, left lateral aspect of the lauren  Stable small bilateral supratentorial subdural hemorrhages  · MRI brain w/wo 5/11/21: 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  There is residual tumor identified as seen on series 10 image 7 and series 1100, image 25  There is edema and hemorrhage within the left lateral aspect of the lauren as well as a smaller focus of hemorrhage within the posterior central aspect of the brain  Mild associated edema  There is a small amount of acute subdural blood seen posteriorly within both hemispheres measuring approximately 2 mm in thickness  There is mild reactive dural thickening and enhancement seen diffusely  Plan:  · Continue frequent neurological checks   Strength intact, sensation to LT improving in left hand, continues with slowed speech but feels tongue swelling is improving, continues with left eye issues as she is unable to move eye medially however double vision is subjectively improving  · STAT CT head with any neurological decline including drop GCS of 2pts within 1 hr  · Pain control -   · Continue Tylenol 975 mg q 8 hours scheduled  · Oxycodone 5 mg and 10 mg every 4 hours as needed for moderate and severe pain, respectively  · Bowel regimen - no BM since surgery, patient feels constipated  · Senokot S, miralax and suppository ordered  · Had BM x 2 over the last 2 days, continue to monitor  · Recommend SBP < 160mmHg  · Keppra 750mg Q 12H x 7 days for seziure ppx  · Continue q 72 hour Decadron taper  · PT/OT: initially recommended rehab but pt was motivated to go home  Pt re-examined today by OT, was deemed appropriate to d/c to home with family support  · Speech eval, cleared patient for full liquids and thins  · DVT PPX: SCDs and heparin SQ  · Completed nursing rounds with Wisam Nielsen RN  · Tentative plan is for pt to d/c to home today  Pt will have 2 week f/u w/nsx for pathology review  Please call with questions or concerns

## 2021-05-16 NOTE — PLAN OF CARE
Problem: OCCUPATIONAL THERAPY ADULT  Goal: Performs self-care activities at highest level of function for planned discharge setting  See evaluation for individualized goals  Description: Treatment Interventions: ADL retraining, Visual perceptual retraining, Functional transfer training, UE strengthening/ROM, Endurance training, Cognitive reorientation, Equipment evaluation/education, Patient/family training, Compensatory technique education, Continued evaluation, Energy conservation, Activityengagement          See flowsheet documentation for full assessment, interventions and recommendations  Outcome: Progressing  Note: Limitation: Decreased ADL status, Decreased UE strength, Decreased endurance, Visual deficit, Decreased self-care trans, Decreased high-level ADLs  Prognosis: Fair  Assessment: Pt seen for formal cognitive evaluation  Upon arrival pt in bathroom  S for toileting, ambulates to bed with S and RW  S STS and sit to supine  Pt scored 5 0 on ACLS indicating that pt is ok to return home with checks to monitor safety and problem solving  Pt also appropriately answers safety questions  Discussed safety plan for home - reports her wife will be home with her for at least 1 week upon d/c  Also reports friends who live nearby that can assist as needed  Also practiced simulated tub transfer  Requires MOD A, decreased balance  Discussed obtaining tub transfer bench with pt for safety - verbalizes good understanding  Educated on how to complete bench transfer, as well as having wife present to S safety during transfer  Patient continues to be functioning below baseline level, occupational performance remains limited secondary to factors listed above and increased risk for falls and injury  From OT standpoint, recommendation at time of d/c would be home with OPOT and increased social support     Patient to benefit from continued Occupational Therapy treatment while in the hospital to address deficits as defined above and maximize level of functional independence with ADLs and functional mobility  Pt was left in bed after session with all current needs met  The patient's raw score on the AM-PAC Daily Activity inpatient short form is 19, standardized score is 40 22, greater than 39 4  Patients at this level are likely to benefit from discharge to home  Please refer to the recommendation of the Occupational Therapist for safe discharge planning    Recommendation: (S) Physiatry Consult  OT Discharge Recommendation: Home with outpatient rehabilitation(OPOTand increased social support)  OT - OK to Discharge: Yes(when medically stable )

## 2021-05-16 NOTE — CASE MANAGEMENT
Cm spoke with therapist, pt cleared for d/c home with outpt PT  Pt states she will need RW at discharge  Order placed via parachute

## 2021-05-16 NOTE — PLAN OF CARE
Problem: Prexisting or High Potential for Compromised Skin Integrity  Goal: Skin integrity is maintained or improved  Description: INTERVENTIONS:  - Identify patients at risk for skin breakdown  - Assess and monitor skin integrity  - Assess and monitor nutrition and hydration status  - Monitor labs   - Assess for incontinence   - Turn and reposition patient  - Assist with mobility/ambulation  - Relieve pressure over bony prominences  - Avoid friction and shearing  - Provide appropriate hygiene as needed including keeping skin clean and dry  - Evaluate need for skin moisturizer/barrier cream  - Collaborate with interdisciplinary team   - Patient/family teaching  - Consider wound care consult   Outcome: Adequate for Discharge     Problem: PAIN - ADULT  Goal: Verbalizes/displays adequate comfort level or baseline comfort level  Description: Interventions:  - Encourage patient to monitor pain and request assistance  - Assess pain using appropriate pain scale  - Administer analgesics based on type and severity of pain and evaluate response  - Implement non-pharmacological measures as appropriate and evaluate response  - Consider cultural and social influences on pain and pain management  - Notify physician/advanced practitioner if interventions unsuccessful or patient reports new pain  Outcome: Adequate for Discharge     Problem: INFECTION - ADULT  Goal: Absence or prevention of progression during hospitalization  Description: INTERVENTIONS:  - Assess and monitor for signs and symptoms of infection  - Monitor lab/diagnostic results  - Monitor all insertion sites, i e  indwelling lines, tubes, and drains  - Monitor endotracheal if appropriate and nasal secretions for changes in amount and color  - [place] appropriate cooling/warming therapies per order  - Administer medications as ordered  - Instruct and encourage patient and family to use good hand hygiene technique  - Identify and instruct in appropriate isolation precautions for identified infection/condition  Outcome: Adequate for Discharge  Goal: Absence of fever/infection during neutropenic period  Description: INTERVENTIONS:  - Monitor WBC    Outcome: Adequate for Discharge     Problem: SAFETY ADULT  Goal: Patient will remain free of falls  Description: INTERVENTIONS:  - Assess patient frequently for physical needs  -  Identify cognitive and physical deficits and behaviors that affect risk of falls    -  [place] fall precautions as indicated by assessment   - Educate patient/family on patient safety including physical limitations  - Instruct patient to call for assistance with activity based on assessment  - Modify environment to reduce risk of injury  - Consider OT/PT consult to assist with strengthening/mobility  Outcome: Adequate for Discharge  Goal: Maintain or return to baseline ADL function  Description: INTERVENTIONS:  -  Assess patient's ability to carry out ADLs; assess patient's baseline for ADL function and identify physical deficits which impact ability to perform ADLs (bathing, care of mouth/teeth, toileting, grooming, dressing, etc )  - Assess/evaluate cause of self-care deficits   - Assess range of motion  - Assess patient's mobility; develop plan if impaired  - Assess patient's need for assistive devices and provide as appropriate  - Encourage maximum independence but intervene and supervise when necessary  - Involve family in performance of ADLs  - Assess for home care needs following discharge   - Consider OT consult to assist with ADL evaluation and planning for discharge  - Provide patient education as appropriate  Outcome: Adequate for Discharge  Goal: Maintain or return mobility status to optimal level  Description: INTERVENTIONS:  - Assess patient's baseline mobility status (ambulation, transfers, stairs, etc )    - Identify cognitive and physical deficits and behaviors that affect mobility  - Identify mobility aids required to assist with transfers and/or ambulation (gait belt, sit-to-stand, lift, walker, cane, etc )  - [place] fall precautions as indicated by assessment  - Record patient progress and toleration of activity level on Mobility SBAR; progress patient to next Phase/Stage  - Instruct patient to call for assistance with activity based on assessment  - Consider rehabilitation consult to assist with strengthening/weightbearing, etc   Outcome: Adequate for Discharge     Problem: DISCHARGE PLANNING  Goal: Discharge to home or other facility with appropriate resources  Description: INTERVENTIONS:  - Identify barriers to discharge w/patient and caregiver  - Arrange for needed discharge resources and transportation as appropriate  - Identify discharge learning needs (meds, wound care, etc )  - Arrange for interpretive services to assist at discharge as needed  - Refer to Case Management Department for coordinating discharge planning if the patient needs post-hospital services based on physician/advanced practitioner order or complex needs related to functional status, cognitive ability, or social support system  Outcome: Adequate for Discharge     Problem: Knowledge Deficit  Goal: Patient/family/caregiver demonstrates understanding of disease process, treatment plan, medications, and discharge instructions  Description: Complete learning assessment and assess knowledge base  Interventions:  - Provide teaching at level of understanding  - Provide teaching via preferred learning methods  Outcome: Adequate for Discharge     Problem: Potential for Falls  Goal: Patient will remain free of falls  Description: INTERVENTIONS:  - Assess patient frequently for physical needs  -  Identify cognitive and physical deficits and behaviors that affect risk of falls    -  [place] fall precautions as indicated by assessment   - Educate patient/family on patient safety including physical limitations  - Instruct patient to call for assistance with activity based on assessment  - Modify environment to reduce risk of injury  - Consider OT/PT consult to assist with strengthening/mobility  Outcome: Adequate for Discharge     Problem: SLP ADULT - SWALLOWING, IMPAIRED  Goal: Initial SLP swallow eval performed  Outcome: Adequate for Discharge  Goal: Advance to least restrictive diet without signs or symptoms of aspiration for planned discharge setting  See evaluation for individualized goals  Description: Dysphagia LTG  -Patient will demonstrate safe and effective oral intake (without overt s/s significant oral/pharyngeal dysphagia including s/s penetration or aspiration) for the highest appropriate diet level  1 Pt will tolerate least restrictive diet w/out s/s aspiration or oral/pharyngeal difficulties  2 Pt will will effectively masticate and transfer solids w/out s/s dysphagia/aspiration  3 Pt will tolerate thin liquids w/out s/s aspiration  Outcome: Adequate for Discharge     Problem: Nutrition/Hydration-ADULT  Goal: Nutrient/Hydration intake appropriate for improving, restoring or maintaining nutritional needs  Description: Monitor and assess patient's nutrition/hydration status for malnutrition  Collaborate with interdisciplinary team and initiate plan and interventions as ordered  Monitor patient's weight and dietary intake as ordered or per policy  Utilize nutrition screening tool and intervene as necessary  Determine patient's food preferences and provide high-protein, high-caloric foods as appropriate       INTERVENTIONS:  - Monitor oral intake, urinary output, labs, and treatment plans  - Assess nutrition and hydration status and recommend course of action  - Evaluate amount of meals eaten  - Assist patient with eating if necessary   - Allow adequate time for meals  - Recommend/ encourage appropriate diets, oral nutritional supplements, and vitamin/mineral supplements  - Order, calculate, and assess calorie counts as needed  - Recommend, monitor, and adjust tube feedings and TPN/PPN based on assessed needs  - Assess need for intravenous fluids  - Provide specific nutrition/hydration education as appropriate  - Include patient/family/caregiver in decisions related to nutrition  Outcome: Adequate for Discharge

## 2021-05-17 ENCOUNTER — TELEPHONE (OUTPATIENT)
Dept: NEUROSURGERY | Facility: CLINIC | Age: 41
End: 2021-05-17

## 2021-05-17 LAB
DME PARACHUTE DELIVERY DATE ACTUAL: NORMAL
DME PARACHUTE DELIVERY DATE REQUESTED: NORMAL
DME PARACHUTE ITEM DESCRIPTION: NORMAL
DME PARACHUTE ORDER STATUS: NORMAL
DME PARACHUTE SUPPLIER NAME: NORMAL
DME PARACHUTE SUPPLIER PHONE: NORMAL

## 2021-05-17 NOTE — TELEPHONE ENCOUNTER
Called patient to see how she is doing after surgery  Patient reports she is doing well overall and denies any incisional issues or fevers  Patient is able to ambulate around the house and complete ADLs  Educated the patient about the importance of preventing blood clots and provided measures how to prevent them  Patient has moved her bowels since the surgery  Encouraged patient to take an over the counter stool softener, if she is taking narcotic pain medication  Encouraged fiber intake and fluids  Reviewed incision care with the patient  Advised that after three days she may take a shower and gently wash the surgical site with soap and water  Use clean wash cloth, towels, and clothing  Do not submerge in water until cleared by the surgeon  Do not apply any creams, ointments, or lotions to the site  Patient is aware to call the office if any redness, swelling, drainage, dehiscence of incision, or fever >100 F occurs  Patient is aware to call the office if any concerns or questions may arise  Reminded patient of her upcoming appointments with the date/time/location  Patient was appreciative for the call

## 2021-05-18 ENCOUNTER — EVALUATION (OUTPATIENT)
Dept: SPEECH THERAPY | Facility: CLINIC | Age: 41
End: 2021-05-18
Payer: MEDICARE

## 2021-05-18 ENCOUNTER — EVALUATION (OUTPATIENT)
Dept: OCCUPATIONAL THERAPY | Facility: CLINIC | Age: 41
End: 2021-05-18
Payer: MEDICARE

## 2021-05-18 DIAGNOSIS — H53.2 DIPLOPIA: Primary | ICD-10-CM

## 2021-05-18 DIAGNOSIS — D33.3 CEREBELLOPONTINE ANGLE TUMOR (HCC): ICD-10-CM

## 2021-05-18 DIAGNOSIS — R47.1 DYSARTHRIA: Primary | ICD-10-CM

## 2021-05-18 PROCEDURE — 97530 THERAPEUTIC ACTIVITIES: CPT

## 2021-05-18 PROCEDURE — 92522 EVALUATE SPEECH PRODUCTION: CPT

## 2021-05-18 PROCEDURE — 97167 OT EVAL HIGH COMPLEX 60 MIN: CPT

## 2021-05-18 NOTE — PROGRESS NOTES
OCCUPATIONAL THERAPY INITIAL EVALUATION:    5/18/2021  Mario Morgan  1980  [id number]  Alysa Valencia PA-C  1  Diplopia        Subjective    "I have double vision all the time"    PATIENT GOAL: "I want to be able to do things on my own"      Assessment/Plan    Skilled Analysis:  Pt is a 41 y o  female referred to Occupational Therapy s/p Diplopia [H53 2]  Pt participated in skilled OT evaluation and following formalized testing, presents with the following areas of deficit: binocular teaming, pursuits, saccades, convergence, symptom management and persistent double vision, balance deficits, dec safety impacting indep and completion of ADL/IADL, salient, and leisure tasks  Pt at this time due to s/s is more appropriate for referral to neuro optometry/ ophthalmologist to address diplopia with necessary prescription adjustments to inc safety with fxnl ADL/IADL tasks  Extensive education provided to pt and spouse Star regarding recommendations and referrals  Pt and spouse verbalized understanding  Pt will req new OT evaluation following optometry evaluation if MD refers  OT edu pt and spouse on recommendation for engagement in fxnl /VM retraining following optometry evaluation if MD deems appropriate      Goals:  N/a tx not indicated at this [facility]        Treatment Interventions  N/a recommendations for neuro optometry        HISTORY OF PRESENT ILLNESS:     Pt is a 41 y o  female who was referred to Occupational Therapy s/p  Diplopia [H53 2] per chart review and pt/family interview, pt presented to neuro sx with complaints of vertigo, pain behind left eye, headaches and diplopia  Pt underwent an an MRI brain on 4/12/21 that showed a calcified mass in the left CP angle with some mass effect on the left lauren with extension in to the cavernous sinus  Pt was scheduled for Craniotomy for mass resection and on 5/10/21 pt was admitted for surgery   Patient underwent left 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  Patient was kept in the PACU until stable and then transferred off  S/p surgery, pt reports headaches, persistent diplopia  PT and OT were consulted, they evaluated patient and recommend discharge to home with OP PT, ST, OT  Pt arrived to OP OT tx with spouse Star  Pt and spouse express frustrations with transition out of the hospital and state they were "not sure" what the next step in the process should be  Prior to sx pt was indep, working as a  and indep with all aspects of ADL, IADL, and mobility  Currently, pts spouse is taking time off of work to attend to pt's needs and assist with areas of ADL/IADL as needed  Pt was d/c home with recommendation for use of RW for mobility  Pt noted to be hitting obstacles 2* vision and balance/safety deficits  Pt wearing an eye patch at all times when awake 2* persistent and constant double vision   Pt's PMHx includes overall pain disorders as well as migraines      PMH:   Past Medical History:   Diagnosis Date    Always tired     Anesthesia     "woke up during one ECT treatment also during wisdom teeth"    Balance problems     Bilateral numbness and tingling of arms and legs     "most of the time"    Chest pain      a few months ago went to ER [facility]," had testing and told everything ok"    Chronic pain disorder     Depressive disorder     Dizziness     "on way to work and felt like going to pass out, to ER and had CT SCAN/MRI brain; found brain mass"    Family history of bleeding disorder     "pt reports Mom has clotting problem"    GERD (gastroesophageal reflux disease)     H1N1 influenza with pneumonia 2011    Headache     Joint pain     neck and shoulders    Migraine     occas    Muscle weakness     Nausea & vomiting     occas    Risk for falls     Shortness of breath     "on exertion feels related to weight gain"    Swollen feet     Tinnitus     Wears glasses     and contacts; will wear glasses DOS    Word finding difficulty     "feels words dont come quickly and also forgetful"               Pain Levels:     Restin    With Activity:  7    Objective      Vision    Convergence Insufficiency Symptom Survey (CISS): 60/60 fail         Comments                                        VISION SCREEN         GLASSES (prescription)          Symptoms Include dizziness, nausea, headache, eye strain, double vision and blurred vision     Last Eye Exam "couple years"           Visual Acuity (near) R eye  20/25     L eye 20/50    Binocularity (near) Unable to formally test 2* double vision    Binocularity (far) Unable to formally test 2* double vision    Convergence  Diplopia reported at all times inches    Accommodation Blurriness/loss of focus Reported at >6ft    [employer]  Unable to formally test 2* double vision    Mobility             Pursuits jerky in all planes Completed monocularly 2* double vision, unable to fixate binocularly            Saccades inaccurate in all planes            Range of Motion WFL             INTERVENTION COMMENTS:  Diagnosis: Diplopia [H53 2]  Precautions: diplopia, h/o mirgaines  FOTO: n/a  Insurance: Payor: MEDICARE / Plan: MEDICARE A AND B / Product Type: Medicare A & B Fee for Service /   1 of 10 visits, PN n/a

## 2021-05-18 NOTE — PROGRESS NOTES
Speech-Language Pathology Initial Evaluation    Today's date: 2021  Patients name: Usha Bangura  : 1980  MRN: [id number]  Safety measures: Visuospatial Deficits  Referring provider: Guerda Rangel PA-C    Primary diagnosis/billing code: R 47 1  Secondary diagnosis/billing code: D 33 3    Visit tracking:  -Referring provider: Epic  -Billing guidelines: CMS  -Visit #1/10  -Insurance: Medicare  -RE due 2021    Subjective comments: "I feel like everything is just slower now "    How did the patient hear about us? Physician    Patient's goal(s): "I don't want to slur my speech "     Reason for referral: Decreased speech intelligibility  Prior functional status: Communication effective and appropriate in all situations  Clinically complex situations: Previous therapy to address similar deficits    History: Patient is a 41 y o  female who was referred to outpatient skilled Speech Therapy services for a motor speech evaluation  Patient presented to [place] [address] on 2021 with reports of chronic migraines and dizziness  At that point, Patient had stated that her migraines had gotten worse over the past four days  MRI revealed a mass on the brainstem with compression of left lauren  The mass was lying posterior to cavernous left carotid artery, lasteral to basilar artery, and inferior to left posterior cerebral artery  Patient then presented for surgery on 05/10/2021 for left retromastoid craniotomy for resection of cerebellar pontine mass  [facility]  The following is the ST note:    -Pt was seen for diagnostic dysphagia therapy to ensure tolerance of current diet (full liquid diet) and to assess potential for safe diet upgrade  Pt was alert and positioned upright   -Pt was assessed with applesauce, toast and thin liquid to begin) then with chicken, mashed potatoes/gravy and green beans (as requested)   Mastication was mildly slowed appearing and mildly prolonged but  effective  Bolus formation and transfer were effective (piecemeal transfer with meat)  Swallow initiation appeared prompt  Laryngeal rise was good by palpation  No coughing, throat clearing, c/o stasis, multiple swallows, change in vocal quality noted c po intake today   -Pt did c/o "slow" speech  She evidenced slowed rate in general   At times when more "normal' rate achieved, articulatory imprecision was present        Assessment:  Pt presented with no s/s oral/pharygneal dysphagia with solid food or thin liquid  She evidenced s/s mild dysarthria      Plan/Recommendations:  Consider upgrade to regular textured food, continue thin liquid  Motor Speech Evaluation/tx to follow prior to d/c or at next level of care  Hearing: WFL  Vision: Decreased (Seen by OT)     Home environment/lifestyle: At home with wife (Star)   Highest level of education: Some College   Vocational status:  at [employer] (Currently not working, but wishes to return to work)     Mental status: Alert  Behavior status: Cooperative  Communication modalities: Verbal  Rehabilitation prognosis: Excellent rehab potential to reach the established goals    Assessments    Motor Speech Evaluation:    -Facial appearance Symmetrical   -Mandible function Adequate ROM   -Dentition Adequate (4 teeth missing, molars)    -Labial function WFL   -Lingual function WFL   -Velar function Symmetrical   -Oral Apraxia? Absent   -Vocal Quality Clear/adequate   -Volitional Cough Strong/productive   -Respiration Support appears weak   -Drooling? No   -Tremor/Involuntary Movement? Not present   -Tracheostomy Present? No       1  Sustained phonation    -Vowel prolongation (norm=15-20 sec) 7 1 sec (5 3, 6 5, 9 5)       2  Diadochokinetic (DDK) Rates    -puh-puh-puh (norm=3 0-5 5 rep/1 sec) WFL   -tuh-tuh-tuh (norm=25 rep/10 sec) WFL   -kuh-kuh-kuh (norm=25 rep/10 sec) WFL   -puh-tuh-kuh (norm=8 rep/10 sec) Irregular       3  Articulation    -Single syllable words 100% intelligible   -Multisyllabic words 100% intelligible   -Repetition of multisyllabic words 5x 100% intelligible   -Words of progressive length 100% intelligible   -Sentences 100% intelligible   -Reading ([place]) 100% intelligible   -Conversation 90% intelligible       4  Counting    -1 to 20 WNL, but with rapid rate   -20 to 1 WNL, but with rapid rate     Patient presents with mild dysarthria characterized by Imprecise articulation and Decreased breath support for speech  Goals    Short Term Goals:   1  Patient will perform oral motor and diadochokinetic speech rate exercises with > 90% accuracy to facilitate increased speech intelligibility, to be achieved in 4-6 weeks  2  Patient will utilize compensatory strategies (I e , over-articulation, decreased rate, etc) 80% of the time while orally reading sentence/paragraph length material to facilitate increased speech intelligibility, to be achieved in 4-6 weeks  3 Patient will complete oral reading and conversational tasks with the consistent use of compensatory strategies >90% of the time to facilitate increased speech intelligibility, to be achieved in 4-6 weeks  Long Term Goals:   1  Patient will demonstrate improved functional and intelligible speech with the use of adequate labial and lingual function, increased articulatory precision, and speech prosody by discharge  2  Patient will independently utilize speech intelligibility strategies (e g , over-exaggeration, slow rate, breath groups, etc ) during oral reading tasks >90% intelligibility to facilitate increased generalization of skills into conversational speech by discharge  3  Patient will demonstrate independent implementation of compensatory strategies in conversation to improve speech intelligibility by discharge             Impressions/Recommendations    Impressions:   -Patient presents with mild dysarthria characterized by imprecise articulation, fast rate, and decreased breath support secondary to removal of cerebellopontine angle tumor  Patient stated she feels as though she is speaking slower than she was previously, but based on evaluation, decreasing rate even more may be beneficial to overall intelligibility  Patient was also noted to have significantly decreased breath support leading to a quite voice  Patient stated that she had H1N1 virus many years ago and has decreased support since then  At this time, Patient would benefit from skilled outpatient Speech Therapy 1X a week to target breath support, over articulation, and slowing her rate of speech       Recommendations:  -Patient would benefit from outpatient skilled Speech Therapy services: Speech/ language therapy    -Frequency: 1x weekly  -Duration: 1-2 weeks    -Intervention certification from: 4/48/4230  -Intervention certification to: 13/85/0622

## 2021-05-21 ENCOUNTER — TELEPHONE (OUTPATIENT)
Dept: NEUROSURGERY | Facility: CLINIC | Age: 41
End: 2021-05-21

## 2021-05-21 NOTE — TELEPHONE ENCOUNTER
Patient called nurseline stating she is having trouble sleeping and is questioning if our office can prescribe sleep medication  Patient states she has tried tylenol PM which is not helping  Returned call to patient and left a voicemail explaining our office does not prescribe sleep medication  Advised she call her PCP for further guidance  Provided office number should she have any other questions/concerns

## 2021-05-23 DIAGNOSIS — Z98.890 POST-OPERATIVE STATE: ICD-10-CM

## 2021-05-23 DIAGNOSIS — D33.3 CEREBELLOPONTINE ANGLE TUMOR (HCC): ICD-10-CM

## 2021-05-23 RX ORDER — METHOCARBAMOL 750 MG/1
750 TABLET, FILM COATED ORAL EVERY 6 HOURS PRN
Qty: 30 TABLET | Refills: 0 | Status: CANCELLED | OUTPATIENT
Start: 2021-05-23

## 2021-05-24 ENCOUNTER — DOCUMENTATION (OUTPATIENT)
Dept: NEUROSURGERY | Facility: CLINIC | Age: 41
End: 2021-05-24

## 2021-05-24 NOTE — TELEPHONE ENCOUNTER
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 PDMP is attached in a separate note in her chart

## 2021-05-24 NOTE — TELEPHONE ENCOUNTER
Will send robaxin refill request to [facility] in one message along with Oxy and Senna request

## 2021-05-25 ENCOUNTER — OFFICE VISIT (OUTPATIENT)
Dept: NEUROSURGERY | Facility: CLINIC | Age: 41
End: 2021-05-25

## 2021-05-25 VITALS
SYSTOLIC BLOOD PRESSURE: 138 MMHG | HEIGHT: 64 IN | DIASTOLIC BLOOD PRESSURE: 89 MMHG | RESPIRATION RATE: 16 BRPM | WEIGHT: 293 LBS | BODY MASS INDEX: 50.02 KG/M2 | HEART RATE: 78 BPM | TEMPERATURE: 97.3 F

## 2021-05-25 DIAGNOSIS — R26.89 IMBALANCE: ICD-10-CM

## 2021-05-25 DIAGNOSIS — H53.2 DIPLOPIA: ICD-10-CM

## 2021-05-25 DIAGNOSIS — D33.3 CEREBELLOPONTINE ANGLE TUMOR (HCC): Primary | ICD-10-CM

## 2021-05-25 DIAGNOSIS — Z48.811 ENCOUNTER FOR SURGICAL AFTERCARE FOLLOWING SURGERY OF NERVOUS SYSTEM: ICD-10-CM

## 2021-05-25 PROCEDURE — 99024 POSTOP FOLLOW-UP VISIT: CPT | Performed by: NEUROLOGICAL SURGERY

## 2021-05-25 RX ORDER — OXYCODONE HYDROCHLORIDE 5 MG/1
5 TABLET ORAL EVERY 6 HOURS PRN
Qty: 28 TABLET | Refills: 0 | Status: SHIPPED | OUTPATIENT
Start: 2021-05-25 | End: 2021-06-04

## 2021-05-25 RX ORDER — METHOCARBAMOL 750 MG/1
750 TABLET, FILM COATED ORAL EVERY 6 HOURS PRN
Qty: 60 TABLET | Refills: 0 | Status: SHIPPED | OUTPATIENT
Start: 2021-05-25

## 2021-05-25 RX ORDER — AMOXICILLIN 250 MG
2 CAPSULE ORAL DAILY PRN
Qty: 30 TABLET | Refills: 0 | Status: SHIPPED | OUTPATIENT
Start: 2021-05-25

## 2021-05-25 NOTE — PROGRESS NOTES
Patient contacted clinic on 05/25/2021 stating that she wants to discharge as her speech is returning to baseline  Appointments were removed and Patient will be discharged at this time

## 2021-05-25 NOTE — PROGRESS NOTES
DISCUSSION SUMMARY  This is a 41 y o  female who is status post a resection of a cerebellopontine angle mass  A small known residual is at the tentorial incisura  I recommended that she have an MRI in 3 months time and that we consider stereotactic radio surgery as the planned 2nd procedure for her  She does have some balance difficulties for which gait and balance training will be very helpful  She has some visual difficulties with diplopia which is improving and on examination she has a significant improvement in her extraocular muscle function  I have recommended against corrective lenses and prisms at this time as she is improving without these  Will plan on seeing her back in the office in 3 months time after completion of the MRI  Return in about 3 months (around 8/25/2021) for follow-up after test         Diagnosis ICD-10-CM Associated Orders   1  Cerebellopontine angle tumor ([facility] [address] )  D33 3 MRI brain with and without contrast   2  Encounter for surgical aftercare following surgery of nervous system  Z48 811 MRI brain with and without contrast   3  Imbalance  R26 89 Ambulatory referral to Physical Therapy   4  Diplopia  H53 2 MRI brain with and without contrast          Chief Complaint   Patient presents with    Post-op     2 week POV, path discussion, with CT head wo 5/13/21, s/p tumor resection on 5/10/21       HPI: Patient presented to [address] [place] [address] on 4/12/21 with 1 month hx of vertigo which was worsening and taking meclizine without relief  Imaging:  CT head w/o, 4/12/21: 2 3cm partially calcified mass left CP angle with some mass effect  MRI brain w/wo, 4/12/21: same as CT head  Mass effect with compression of left lauren, no extension into cavernous sinus or meckel's cave  No edema appreciated on FLAIR images  Attending reviewed imaging with Neurosurgery team and we recommended an outpatient f/u in 1-2 weeks      Patient was seen in our office on 4/15/21 with symptomatic large cerebellopontine angle mass  I recommended combination therapy of surgery as well as postsurgical radiation therapy to address this mass  Status post left 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 on 5/10/21 (DKO); Final Dx: Meningioma, WHO grade I, 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  She does have some gait and balance difficulties which are improving  She is hypomanic currently  She is coming off of her steroids  She has no depression  Review of Systems   Constitutional: Positive for activity change (starting therapy next week; 2x/week for 6 weeks)  HENT: Negative  Eyes: Positive for visual disturbance (double vision which is improving and some trouible focusing in close distance)  Has appointment with eye doctor on Thursday 5/27/21   Respiratory: Negative  Cardiovascular: Negative  Gastrointestinal: Negative  Endocrine: Negative  Genitourinary: Negative  Musculoskeletal: Positive for gait problem (slightly unsteady)  Skin: Negative  Allergic/Immunologic: Negative  Neurological: Positive for light-headedness (occasional and very mild) and headaches (2-3/10)  Hematological: Negative  Psychiatric/Behavioral: Negative  All other systems reviewed and are negative  I reviewed the ROS      Vitals:    /89 (BP Location: Right arm, Patient Position: Sitting, Cuff Size: Standard)   Pulse 78   Temp (!) 97 3 °F (36 3 °C) (Probe)   Resp 16   Ht 5' 4" (1 626 m)   Wt (!) 137 kg (302 lb)   BMI 51 84 kg/m²       MEDICAL HISTORY  Past Medical History:   Diagnosis Date    Always tired     Anesthesia     "woke up during one ECT treatment also during wisdom teeth"    Balance problems     Bilateral numbness and tingling of arms and legs     "most of the time"    Chest pain      a few months ago went to ER [facility]," had testing and told everything ok"    Chronic pain disorder     Depressive disorder     Dizziness "on way to work and felt like going to pass out, to ER and had CT SCAN/MRI brain; found brain mass"    Family history of bleeding disorder     "pt reports Mom has clotting problem"    GERD (gastroesophageal reflux disease)     H1N1 influenza with pneumonia 2011    Headache     Joint pain     neck and shoulders    Migraine     occas    Muscle weakness     Nausea & vomiting     occas    Risk for falls     Shortness of breath     "on exertion feels related to weight gain"    Swollen feet     Tinnitus     Wears glasses     and contacts; will wear glasses DOS    Word finding difficulty     "feels words dont come quickly and also forgetful"     Past Surgical History:   Procedure Laterality Date    COLONOSCOPY      ELECTROCONVULSIVE THERAPY      19 treatments    HYSTERECTOMY      MS EXCIS TRANSTEMP CP ANGLE TUMOR Left 5/10/2021    Procedure: left 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;  Surgeon: Lori Warren MD;  [facility]: [facility];  Service: Neurosurgery    WISDOM TOOTH EXTRACTION       Social History     Tobacco Use    Smoking status: Current Every Day Smoker     Packs/day: 0 33     Types: Cigarettes    Smokeless tobacco: Never Used    Tobacco comment: has quit in the past will try "cold turkey"   Substance Use Topics    Alcohol use: Yes     Frequency: 2-4 times a month     Drinks per session: 1 or 2     Comment: "has cut back"    Drug use: Not Currently     Types: Marijuana     Comment: "hasnt used in several weeks"        Current Outpatient Medications:     acetaminophen (TYLENOL) 500 mg tablet, Take 1,000 mg by mouth every 6 (six) hours as needed for mild pain, Disp: , Rfl:     azelastine (OPTIVAR) 0 05 % ophthalmic solution, Apply 1 drop to eye as needed, Disp: , Rfl:     Biotin w/ Vitamins C & E (HAIR/SKIN/NAILS PO), Take by mouth, Disp: , Rfl:     Coenzyme Q10 (COQ-10 PO), Take by mouth daily, Disp: , Rfl:     COVID-19 mRNA Virus Vaccine (MODERNA COVID-19 VACCINE IM), Inject into a muscle 1st dose received 4/21/21 2nd dose planned for 5/26/21, Disp: , Rfl:     dexamethasone (DECADRON) 2 mg tablet, Take 1 tablet (2 mg total) by mouth 4 (four) times a day for 3 days, THEN 1 tablet (2 mg total) 3 (three) times a day for 3 days, THEN 1 tablet (2 mg total) 2 (two) times a day for 3 days, THEN 1 tablet (2 mg total) daily for 3 days  , Disp: 30 tablet, Rfl: 0    famotidine (PEPCID) 40 MG tablet, Take 40 mg by mouth daily, Disp: , Rfl:     meclizine (ANTIVERT) 25 mg tablet, Take 1 tablet (25 mg total) by mouth 3 (three) times a day as needed for dizziness for up to 15 doses, Disp: 15 tablet, Rfl: 0    methocarbamol (ROBAXIN) 750 mg tablet, Take 1 tablet (750 mg total) by mouth every 6 (six) hours as needed for muscle spasms, Disp: 60 tablet, Rfl: 0    ondansetron (ZOFRAN-ODT) 4 mg disintegrating tablet, Take 1 tablet (4 mg total) by mouth every 6 (six) hours as needed for nausea or vomiting for up to 20 doses, Disp: 20 tablet, Rfl: 0    oxyCODONE (ROXICODONE) 5 mg immediate release tablet, Take 1 tablet (5 mg total) by mouth every 6 (six) hours as needed for severe pain for up to 10 daysMax Daily Amount: 20 mg, Disp: 28 tablet, Rfl: 0    senna-docusate sodium (SENOKOT S) 8 6-50 mg per tablet, Take 2 tablets by mouth daily as needed for constipation, Disp: 30 tablet, Rfl: 0    sertraline (ZOLOFT) 50 mg tablet, 100 mg daily , Disp: , Rfl:    No Known Allergies     The following portions of the patient's history were updated by MA and reviewed by MD: allergies, current medications, past family history, past medical history, past social history, past surgical history and problem list       Physical Exam  Incision clean and dry  Extraocular movements are full with a lag in I repositioning with rapid movement  Pupils are equal and reactive bilaterally  Her incision is clean and dry  Her station and gait demonstrate some central position registration changes which is secondary to the surgery and is anticipated to improve    RESULTS/DATA  I have personally reviewed pertinent films in PACS   CT scan of the brain is reviewed which demonstrates residual tumor at the tentorial incisura as was planned

## 2021-05-26 ENCOUNTER — APPOINTMENT (OUTPATIENT)
Dept: SPEECH THERAPY | Facility: CLINIC | Age: 41
End: 2021-05-26
Payer: MEDICARE

## 2021-06-02 ENCOUNTER — EVALUATION (OUTPATIENT)
Dept: PHYSICAL THERAPY | Facility: CLINIC | Age: 41
End: 2021-06-02
Payer: MEDICARE

## 2021-06-02 DIAGNOSIS — R26.89 IMBALANCE: ICD-10-CM

## 2021-06-02 PROCEDURE — 97162 PT EVAL MOD COMPLEX 30 MIN: CPT

## 2021-06-02 PROCEDURE — 97535 SELF CARE MNGMENT TRAINING: CPT

## 2021-06-02 NOTE — PROGRESS NOTES
PT Evaluation     Today's date: 2021  Patient name: Ghulam Benton  : 1980  MRN: [id number]  Referring provider: Gracia Sam MD  Dx:   Encounter Diagnosis     ICD-10-CM    1  Imbalance  R26 89 Ambulatory referral to Physical Therapy                  Assessment  Assessment details: Pt present s/p removal brain tumor on 5-10-21  Reports since that time she has experienced diplopia along with diminished balance and ambulatory ability  Reports symptoms she experienced prior to surgery improving  Had no mobility issues prior  At this time she is receiving vision therapy for visual disturbances  PT notes abnormal gait pattern and decreased balance  Increased time noted with TUG test and 5x sit to stand  She reports difficulty with normal ADL's and mobility  Pt will benefit from PT tx to decrease symptoms and restore normal functional level  Impairments: abnormal gait, abnormal or restricted ROM, activity intolerance, impaired balance, impaired physical strength and lacks appropriate home exercise program     Prognosis: good    Goals  ST  Increase strength by 1/2 to 1 MMT grade 6 wk  2   Decrease TUG test by 3 sec 6 wk   3  Pt will decrease 5x sit to stand time by 5 sec 6 wk  4  Pt will ambulate with normal step length/pace 6 wk  5  Pt will be able to reach to floor from standing position without LOB 6 wk    LT  Pt will demonstrate WNL strength BUE/BLE 12 wk   2  Pt will report no limitations with ADL's 12 wk  3  Pt will report no limitations with ambulation 12 wk   4    Pt will demonstrate TUG test of 12 sec or less 12 wk      Plan  Patient would benefit from: PT eval and skilled physical therapy  Planned therapy interventions: abdominal trunk stabilization, balance, neuromuscular re-education, strengthening, therapeutic activities, therapeutic exercise, flexibility, gait training and home exercise program  Frequency: 3x week  Duration in weeks: 12  Treatment plan discussed with: patient        Subjective Evaluation    History of Present Illness  Date of surgery: 5/10/2021  Mechanism of injury: Hx brain tumor  Was having symptoms for past 15 years with worsening over the past year  Was experiencing dizziness and headaches  Went to ER and tumor found  Had brain surgery to remove tumor 5-10-21  Since surgery she has had double vision  She is currently receiving vision therapy for this  Reports this is improving  Reports difficulty with gait and balance  Reports difficulty coordinating movement  Reports no BLE instability  No difficulty clearing LE's  No gait difficulties prior to surgery  Continued headaches but less intense  Difficulty turning corners, reaching to ground  Reports altered depth perception  Hand dominance: right  Exercise history: Enjoys reading, doing word puzzles, painting  Life stress:    Currently off work  Treatments  Current treatment: occupational therapy  Patient Goals  Patient goals for therapy: increased strength, improved balance, return to work, independence with ADLs/IADLs and return to sport/leisure activities  Patient goal: Be able to walk normally        Objective     Concurrent Complaints  Positive for dizziness and headaches       Additional Special Questions  Well healing incision posterior to left ear, base skull    Strength/Myotome Testing     Left Shoulder     Planes of Motion   Flexion: 4   Abduction: 4   External rotation at 0°: 4   Internal rotation at 0°: 4     Right Shoulder     Planes of Motion   Flexion: 4   Abduction: 4   External rotation at 0°: 4   Internal rotation at 0°: 4     Left Elbow   Flexion: 4  Extension: 4    Right Elbow   Flexion: 4  Extension: 4    Left Hip   Planes of Motion   Flexion: 4  Extension: 4  Abduction: 4  Adduction: 4    Right Hip   Planes of Motion   Flexion: 4  Extension: 4  Abduction: 4  Adduction: 4    Left Knee   Flexion: 4  Extension: 4    Right Knee   Flexion: 4  Extension: 4    Left Ankle/Foot   Dorsiflexion: 4  Plantar flexion: 4  Inversion: 4  Eversion: 4    Right Ankle/Foot   Dorsiflexion: 4  Plantar flexion: 4  Inversion: 4  Eversion: 4    Ambulation     Ambulation: Stairs   Ascend stairs: independent  Pattern: non-reciprocal  Descend stairs: independent  Pattern: non-reciprocal    Observational Gait   Gait: asymmetric     Additional Observational Gait Details  Abbreviated step length bilaterally  Deviates right/left  Reports difficulty with turning    Functional Assessment        Comments  Stand eyes closed:  5 sec  Side stepping: Reports feeling uncoordinated  Retro Walking:  Feels unsteady  Bending/Crouching to reach floor:  LOB forward  Neuro Exam:     Headaches   Patient reports headaches: Yes  Duration: nearly constant   Intensity at best: 0/10  Intensity at worst: 5/10  Average intensity: 3/10  Location: Usually left temporal/frontal region  Exacerbating factors: none reported  Relieving factors: none reported     Functional outcomes   5x sit to stand: 24 5 (seconds)  TU 5 (seconds)      Balance assessments   Single leg stance   Left eyes open firm surface: 5  Right eyes open firm surface: 1             Precautions:  Brain Tumor Removal 2021  Decreased balance    Vision deficits       Manuals 6-2-21                                       Neuro Re-Ed         U stance        Tandem stance        Bosu lunge        Stand physioball lift/lower        Biodex         Star drill        St hip 3-way        Ther Ex        [address] [facility]                                                HEP Instructed and issued HEP       Ther Activity                        Gait Training        Heel to toe walk        Side stepping        Retro amb        Karaoke         Step ups                        Modalities

## 2021-06-07 ENCOUNTER — OFFICE VISIT (OUTPATIENT)
Dept: PHYSICAL THERAPY | Facility: CLINIC | Age: 41
End: 2021-06-07
Payer: MEDICARE

## 2021-06-07 DIAGNOSIS — R26.89 IMBALANCE: Primary | ICD-10-CM

## 2021-06-07 PROCEDURE — 97116 GAIT TRAINING THERAPY: CPT

## 2021-06-07 PROCEDURE — 97112 NEUROMUSCULAR REEDUCATION: CPT

## 2021-06-07 PROCEDURE — 97110 THERAPEUTIC EXERCISES: CPT

## 2021-06-07 NOTE — PROGRESS NOTES
Daily Note     Today's date: 2021  Patient name: Adeline Oakes  : 1980  MRN: [id number]  Referring provider: Batool Burns MD  Dx:   Encounter Diagnosis     ICD-10-CM    1  Imbalance  R26 89                   Subjective:  I'm doing ok      Objective: See treatment diary below      Assessment: Tolerated treatment well  Fatigues easily with activity but did demonstrate improved balance with U stance  Reports performing HEP for balance activities  Patient would benefit from continued PT      Plan: Continue per plan of care  Precautions:  Brain Tumor Removal 2021  Decreased balance    Vision deficits       Manuals 21                                      Neuro Re-Ed         U stance  5x  Corry biju      Tandem stance  3x  To corry ea      Bosu lunge  10x  biju      Stand physioball lift/lower        Biodex         Star drill        St hip 3-way  1#  2x10 biju      Ther Ex        [address] [facility]  L4  10'                                              HEP Instructed and issued HEP       Ther Activity                        Gait Training        Heel to toe walk        Side stepping        Retro amb        [place]   6x  R/L at rail      Step ups  2x10  biju  2 riser                      Modalities

## 2021-06-10 ENCOUNTER — OFFICE VISIT (OUTPATIENT)
Dept: PHYSICAL THERAPY | Facility: CLINIC | Age: 41
End: 2021-06-10
Payer: MEDICARE

## 2021-06-10 DIAGNOSIS — R26.89 IMBALANCE: Primary | ICD-10-CM

## 2021-06-10 PROCEDURE — 97112 NEUROMUSCULAR REEDUCATION: CPT

## 2021-06-10 PROCEDURE — 97110 THERAPEUTIC EXERCISES: CPT

## 2021-06-10 PROCEDURE — 97116 GAIT TRAINING THERAPY: CPT

## 2021-06-10 NOTE — PROGRESS NOTES
Daily Note     Today's date: 6/10/2021  Patient name: Nataliia Lucas  : 1980  MRN: [id number]  Referring provider: Ramses Bansal MD  Dx:   Encounter Diagnosis     ICD-10-CM    1  Imbalance  R26 89                   Subjective: The patient states that she is a little off today from her vision therapy  Objective: See treatment diary below      Assessment: Tolerated treatment well  Patient exhibited good technique with therapeutic exercises and would benefit from continued PT  Patient demonstrated fair balance  Plan: Continue per plan of care  Precautions:  Brain Tumor Removal 2021  Decreased balance    Vision deficits        Manuals 6-2-21 6-7-21 6/10/21                                                               Neuro Re-Ed              U stance   5x  Corry biju  5x to corry       Tandem stance   3x  To corry ea  5x to corry       Bosu lunge   10x  biju         Stand physioball lift/lower             Biodex              Star drill             St hip 3-way   1#  2x10 biju 2# 30x biju       Ther Ex             [address] [facility]             [facility]   L4  10' L4 10'                                                                             HEP Instructed and issued HEP           Ther Activity                                         Gait Training             Heel to toe walk     6x        Side stepping     6x       Retro amb             Karaoke    6x  R/L at rail 6x       Step ups   2x10  biju  2 riser 20x 2 risers                                   Modalities

## 2021-06-14 ENCOUNTER — APPOINTMENT (OUTPATIENT)
Dept: PHYSICAL THERAPY | Facility: CLINIC | Age: 41
End: 2021-06-14
Payer: MEDICARE

## 2021-06-16 ENCOUNTER — APPOINTMENT (OUTPATIENT)
Dept: PHYSICAL THERAPY | Facility: CLINIC | Age: 41
End: 2021-06-16
Payer: MEDICARE

## 2021-06-17 ENCOUNTER — APPOINTMENT (OUTPATIENT)
Dept: PHYSICAL THERAPY | Facility: CLINIC | Age: 41
End: 2021-06-17
Payer: MEDICARE

## 2021-07-12 NOTE — PROGRESS NOTES
PT DISCHARGE     Today's date: 2021  Patient name: Mario Morgan  : 1980  MRN: [id number]  Referring provider: Yosef Marquez MD  Dx:   Encounter Diagnosis     ICD-10-CM    1  Imbalance  R26 89        Start Time:   Stop Time:   Total time in clinic (min): 45 minutes    Assessment  Assessment details: Pt presented s/p removal brain tumor on 5-10-21  Reported since that time she has experienced diplopia along with diminished balance and ambulatory ability  D/C:  Pt attended 3 total PT sessions  Demonstrated fatigue with activity during sessions  Needed continued improvement in balance  Was attending vision therapy as well  Pt last attended session was on 6-10-21  She did not return after that time  D/C PT services  Impairments: abnormal gait, abnormal or restricted ROM, activity intolerance, impaired balance, impaired physical strength and lacks appropriate home exercise program     Prognosis: good    Goals  ST  Increase strength by 1/2 to 1 MMT grade 6 wk  2   Decrease TUG test by 3 sec 6 wk   3  Pt will decrease 5x sit to stand time by 5 sec 6 wk  4  Pt will ambulate with normal step length/pace 6 wk  5  Pt will be able to reach to floor from standing position without LOB 6 wk  (not met)    LT  Pt will demonstrate WNL strength BUE/BLE 12 wk   2  Pt will report no limitations with ADL's 12 wk  3  Pt will report no limitations with ambulation 12 wk   4    Pt will demonstrate TUG test of 12 sec or less 12 wk  (not met)    Plan  Plan details: D/C PT services  Findings per initial evaluation   Patient would benefit from: PT eval and skilled physical therapy  Planned therapy interventions: abdominal trunk stabilization, balance, neuromuscular re-education, strengthening, therapeutic activities, therapeutic exercise, flexibility, gait training and home exercise program        Subjective Evaluation    History of Present Illness  Date of surgery: 5/10/2021  Mechanism of injury: Hx brain tumor  Was having symptoms for past 15 years with worsening over the past year  Was experiencing dizziness and headaches  Went to ER and tumor found  Had brain surgery to remove tumor 5-10-21  Since surgery she has had double vision  She is currently receiving vision therapy for this  Reports this is improving  Reports difficulty with gait and balance  Reports difficulty coordinating movement  Reports no BLE instability  No difficulty clearing LE's  No gait difficulties prior to surgery  Continued headaches but less intense  Difficulty turning corners, reaching to ground  Reports altered depth perception  Hand dominance: right  Exercise history: Enjoys reading, doing word puzzles, painting  Life stress:    Currently off work  Treatments  Current treatment: occupational therapy  Patient Goals  Patient goals for therapy: increased strength, improved balance, return to work, independence with ADLs/IADLs and return to sport/leisure activities  Patient goal: Be able to walk normally        Objective     Concurrent Complaints  Positive for dizziness and headaches       Additional Special Questions  Well healing incision posterior to left ear, base skull    Strength/Myotome Testing     Left Shoulder     Planes of Motion   Flexion: 4   Abduction: 4   External rotation at 0°: 4   Internal rotation at 0°: 4     Right Shoulder     Planes of Motion   Flexion: 4   Abduction: 4   External rotation at 0°: 4   Internal rotation at 0°: 4     Left Elbow   Flexion: 4  Extension: 4    Right Elbow   Flexion: 4  Extension: 4    Left Hip   Planes of Motion   Flexion: 4  Extension: 4  Abduction: 4  Adduction: 4    Right Hip   Planes of Motion   Flexion: 4  Extension: 4  Abduction: 4  Adduction: 4    Left Knee   Flexion: 4  Extension: 4    Right Knee   Flexion: 4  Extension: 4    Left Ankle/Foot   Dorsiflexion: 4  Plantar flexion: 4  Inversion: 4  Eversion: 4    Right Ankle/Foot Dorsiflexion: 4  Plantar flexion: 4  Inversion: 4  Eversion: 4    Ambulation     Ambulation: Stairs   Ascend stairs: independent  Pattern: non-reciprocal  Descend stairs: independent  Pattern: non-reciprocal    Observational Gait   Gait: asymmetric     Additional Observational Gait Details  Abbreviated step length bilaterally  Deviates right/left  Reports difficulty with turning    Functional Assessment        Comments  Stand eyes closed:  5 sec  Side stepping: Reports feeling uncoordinated  Retro Walking:  Feels unsteady  Bending/Crouching to reach floor:  LOB forward  Neuro Exam:     Headaches   Patient reports headaches: Yes     Duration: nearly constant   Intensity at best: 0/10  Intensity at worst: 5/10  Average intensity: 3/10  Location: Usually left temporal/frontal region  Exacerbating factors: none reported  Relieving factors: none reported     Functional outcomes   5x sit to stand: 24 5 (seconds)  TU 5 (seconds)      Balance assessments   Single leg stance   Left eyes open firm surface: 5  Right eyes open firm surface: 1

## 2021-07-16 DIAGNOSIS — Z98.890 POST-OPERATIVE STATE: ICD-10-CM

## 2021-07-16 DIAGNOSIS — D33.3 CEREBELLOPONTINE ANGLE TUMOR (HCC): ICD-10-CM

## 2021-07-16 RX ORDER — METHOCARBAMOL 750 MG/1
750 TABLET, FILM COATED ORAL EVERY 6 HOURS PRN
Qty: 60 TABLET | Refills: 0 | Status: CANCELLED | OUTPATIENT
Start: 2021-07-16

## 2021-08-26 ENCOUNTER — HOSPITAL ENCOUNTER (OUTPATIENT)
Dept: MRI IMAGING | Facility: HOSPITAL | Age: 41
Discharge: HOME | End: 2021-08-26
Attending: NEUROLOGICAL SURGERY
Payer: MEDICARE

## 2021-08-26 DIAGNOSIS — D33.3 CEREBELLOPONTINE ANGLE TUMOR (HCC): ICD-10-CM

## 2021-08-26 DIAGNOSIS — H53.2 DIPLOPIA: ICD-10-CM

## 2021-08-26 DIAGNOSIS — Z48.811 ENCOUNTER FOR SURGICAL AFTERCARE FOLLOWING SURGERY OF NERVOUS SYSTEM: ICD-10-CM

## 2021-08-26 PROCEDURE — G1004 CDSM NDSC: HCPCS

## 2021-08-26 PROCEDURE — A9585 GADOBUTROL INJECTION: HCPCS | Performed by: NEUROLOGICAL SURGERY

## 2021-08-26 PROCEDURE — 70553 MRI BRAIN STEM W/O & W/DYE: CPT

## 2021-08-26 RX ADMIN — GADOBUTROL 13 ML: 604.72 INJECTION INTRAVENOUS at 17:31

## 2021-08-31 ENCOUNTER — TELEPHONE (OUTPATIENT)
Dept: NEUROSURGERY | Facility: CLINIC | Age: 41
End: 2021-08-31

## 2021-09-02 ENCOUNTER — OFFICE VISIT (OUTPATIENT)
Dept: NEUROSURGERY | Facility: CLINIC | Age: 41
End: 2021-09-02
Payer: MEDICARE

## 2021-09-02 VITALS
WEIGHT: 290 LBS | RESPIRATION RATE: 16 BRPM | HEART RATE: 78 BPM | DIASTOLIC BLOOD PRESSURE: 100 MMHG | HEIGHT: 64 IN | SYSTOLIC BLOOD PRESSURE: 129 MMHG | TEMPERATURE: 96.6 F | BODY MASS INDEX: 49.51 KG/M2

## 2021-09-02 DIAGNOSIS — D33.3 CEREBELLOPONTINE ANGLE TUMOR (HCC): Primary | ICD-10-CM

## 2021-09-02 PROCEDURE — 99213 OFFICE O/P EST LOW 20 MIN: CPT | Performed by: NEUROLOGICAL SURGERY

## 2021-09-02 RX ORDER — SIMVASTATIN 40 MG
40 TABLET ORAL
COMMUNITY
Start: 2021-06-02 | End: 2022-06-02

## 2021-09-02 RX ORDER — TRAZODONE HYDROCHLORIDE 50 MG/1
TABLET ORAL
COMMUNITY
Start: 2021-08-22

## 2021-09-02 RX ORDER — BUPROPION HYDROCHLORIDE 150 MG/1
150 TABLET ORAL DAILY
COMMUNITY

## 2021-09-02 RX ORDER — LEVOCETIRIZINE DIHYDROCHLORIDE 5 MG/1
TABLET, FILM COATED ORAL
COMMUNITY
Start: 2021-08-11

## 2021-09-02 RX ORDER — NAPROXEN 500 MG/1
TABLET ORAL
COMMUNITY
Start: 2021-08-20

## 2021-09-02 NOTE — PROGRESS NOTES
DISCUSSION SUMMARY  This is a 35 y o  female who is status post resection of a cerebellopontine angle mass  She has a small residual but otherwise is doing very well  She does have a disruption of her sleep-wake cycles and this is improving over time  I have recommended that she go for a driving evaluation and that she have her next MRI in 1 year's time  I would recommend that she have a driving evaluation  No follow-ups on file  Diagnosis ICD-10-CM Associated Orders   1  Cerebellopontine angle tumor ([facility] [address] )  D33 3           Chief Complaint   FOLLOW UP  3 months F/u for Cerebellopontine angle tumor MRI Brain 8/26/21     HPI  This patient is known to our office for Cerebellopontine angle tumor   Previous Surgery:   o 5/10/21 (DKO) left 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 (Left Head)   Prior Symptoms: Some visual disturbance such as double vision and trouble focusing on close objects; gait difficulty with slight unsteadiness; Occasional light-headedness and headaches   Physical Therapy: Yes    Today, patient is status post a left suboccipital craniotomy for resection of cerebellopontine angle mass  There is a small residual mass but this is not compressive in nature  Is unchanged in size  I have recommended that this be followed perfectly  She does complain of some neck pain which is relatively well controlled  She has some photophobia as well as visual disturbance with double vision every once in a while  Her significant other describes her placing cards in in abnormal position not because of lack of choice but sometimes perhaps because she is not paying attention carefully  She does have some unsteadiness of gait as described by her signet the other although I do not see this on exam today  She also describes intermittent periods of speech difficulty although she does not have this today  She does have periods of agitation and memory loss    These problems do appear to be worse when she has her sleep disturbances  Review of Systems   Constitutional: Positive for activity change (starting therapy next week; 2x/week for 6 weeks) and fatigue (worsens vision)  HENT: Positive for dental problem (cavities), rhinorrhea, sinus pressure, sinus pain, tinnitus (b/l ears, L>R) and trouble swallowing (occasional)  Eyes: Positive for photophobia (bright lights) and visual disturbance (frequent double vision which is improving and some trouible focusing in close distance )  Has appointment with eye doctor on Thursday 5/27/21   Respiratory: Positive for cough  Cardiovascular: Negative  Gastrointestinal: Positive for constipation (hx)  Endocrine: Negative  Genitourinary: Negative  Musculoskeletal: Positive for gait problem (slightly unsteady), neck pain (L>R slight neck and head pain ) and neck stiffness  Skin: Negative  Allergic/Immunologic: Negative  Neurological: Positive for speech difficulty (sometimes, slurred words), light-headedness (occasional and very mild) and headaches (2-3/10)  Hematological: Negative  Psychiatric/Behavioral: Positive for agitation, decreased concentration (memory loss short and long term) and sleep disturbance (insomnia)  All other systems reviewed and are negative  I reviewed the ROS      Vitals:    /100 (BP Location: Left arm, Patient Position: Sitting, Cuff Size: Standard)   Pulse 78   Temp (!) 96 6 °F (35 9 °C) (Probe)   Resp 16   Ht 5' 4" (1 626 m)   Wt 132 kg (290 lb)   BMI 49 78 kg/m²       MEDICAL HISTORY  Past Medical History:   Diagnosis Date    Always tired     Anesthesia     "woke up during one ECT treatment also during wisdom teeth"    Balance problems     Bilateral numbness and tingling of arms and legs     "most of the time"    Chest pain      a few months ago went to ER [facility]," had testing and told everything ok"    Chronic pain disorder     Depressive disorder  Dizziness     "on way to work and felt like going to pass out, to ER and had CT SCAN/MRI brain; found brain mass"    Family history of bleeding disorder     "pt reports Mom has clotting problem"    GERD (gastroesophageal reflux disease)     H1N1 influenza with pneumonia 2011    Headache     Joint pain     neck and shoulders    Migraine     occas    Muscle weakness     Nausea & vomiting     occas    Risk for falls     Shortness of breath     "on exertion feels related to weight gain"    Swollen feet     Tinnitus     Wears glasses     and contacts; will wear glasses DOS    Word finding difficulty     "feels words dont come quickly and also forgetful"     Past Surgical History:   Procedure Laterality Date    COLONOSCOPY      ELECTROCONVULSIVE THERAPY      19 treatments    HYSTERECTOMY      DE EXCIS TRANSTEMP CP ANGLE TUMOR Left 5/10/2021    Procedure: left 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;  Surgeon: Cinthia Bence, MD;  Location: [facility];  Service: Neurosurgery    WISDOM TOOTH EXTRACTION       Social History     Tobacco Use    Smoking status: Current Every Day Smoker     Packs/day: 0 33     Types: Cigarettes    Smokeless tobacco: Never Used    Tobacco comment: has quit in the past will try "cold turkey"   Vaping Use    Vaping Use: Never used   Substance Use Topics    Alcohol use: Yes     Comment: "has cut back"    Drug use: Not Currently     Types: Marijuana     Comment: occasional        Current Outpatient Medications:     acetaminophen (TYLENOL) 500 mg tablet, Take 1,000 mg by mouth every 6 (six) hours as needed for mild pain, Disp: , Rfl:     azelastine (OPTIVAR) 0 05 % ophthalmic solution, Apply 1 drop to eye as needed, Disp: , Rfl:     Biotin w/ Vitamins C & E (HAIR/SKIN/NAILS PO), Take by mouth, Disp: , Rfl:     buPROPion (WELLBUTRIN XL) 150 mg 24 hr tablet, Take 150 mg by mouth daily, Disp: , Rfl:     Coenzyme Q10 (COQ-10 PO), Take by mouth daily, Disp: , Rfl:     COVID-19 mRNA Virus Vaccine (MODERNA COVID-19 VACCINE IM), Inject into a muscle 1st dose received 4/21/21 2nd dose planned for 5/26/21, Disp: , Rfl:     famotidine (PEPCID) 40 MG tablet, Take 40 mg by mouth daily, Disp: , Rfl:     levocetirizine (XYZAL) 5 MG tablet, , Disp: , Rfl:     meclizine (ANTIVERT) 25 mg tablet, Take 1 tablet (25 mg total) by mouth 3 (three) times a day as needed for dizziness for up to 15 doses, Disp: 15 tablet, Rfl: 0    methocarbamol (ROBAXIN) 750 mg tablet, Take 1 tablet (750 mg total) by mouth every 6 (six) hours as needed for muscle spasms, Disp: 60 tablet, Rfl: 0    naproxen (NAPROSYN) 500 mg tablet, , Disp: , Rfl:     ondansetron (ZOFRAN-ODT) 4 mg disintegrating tablet, Take 1 tablet (4 mg total) by mouth every 6 (six) hours as needed for nausea or vomiting for up to 20 doses, Disp: 20 tablet, Rfl: 0    senna-docusate sodium (SENOKOT S) 8 6-50 mg per tablet, Take 2 tablets by mouth daily as needed for constipation, Disp: 30 tablet, Rfl: 0    sertraline (ZOLOFT) 50 mg tablet, Take 100 mg by mouth daily , Disp: , Rfl:     simvastatin (ZOCOR) 40 mg tablet, Take 40 mg by mouth, Disp: , Rfl:     traZODone (DESYREL) 50 mg tablet, , Disp: , Rfl:    No Known Allergies     The following portions of the patient's history were updated by MA and reviewed by MD: allergies, current medications, past family history, past medical history, past social history, past surgical history and problem list       Physical Exam  Vitals and nursing note reviewed  Constitutional:       General: She is not in acute distress  Appearance: Normal appearance  She is not ill-appearing, toxic-appearing or diaphoretic  HENT:      Head: Normocephalic  Nose: Nose normal    Eyes:      Extraocular Movements: Extraocular movements intact  Pupils: Pupils are equal, round, and reactive to light     Musculoskeletal:         General: No swelling, tenderness, deformity or signs of injury  Normal range of motion  Cervical back: Normal range of motion and neck supple  No rigidity  Right lower leg: No edema  Left lower leg: No edema  Lymphadenopathy:      Cervical: No cervical adenopathy  Skin:     General: Skin is warm and dry  Coloration: Skin is not jaundiced  Findings: No erythema or rash  Neurological:      General: No focal deficit present  Mental Status: She is alert and oriented to person, place, and time  Cranial Nerves: No cranial nerve deficit  Sensory: No sensory deficit  Motor: No weakness  Coordination: Coordination normal ( she has no abnormality in her find coordination including finger-to-nose testing )  Gait: Gait (She was able to ambulate in a straight line without difficulty  She was wearing high-heeled boots ) normal       Deep Tendon Reflexes: Reflexes normal    Psychiatric:         Mood and Affect: Mood normal          Behavior: Behavior normal          Thought Content: Thought content normal          Judgment: Judgment normal            RESULTS/DATA  I have personally reviewed pertinent films in PACS   MRI of the brain is carefully reviewed and compared to the previous study  This demonstrates a small residual along the tentorial edge and above the 7 8 nerve complex  The brainstem is back into normal position without signs of abnormality  There is no hydrocephalus  There are no extra-axial collections

## 2022-02-02 ENCOUNTER — TELEPHONE (OUTPATIENT)
Dept: NEUROSURGERY | Facility: CLINIC | Age: 42
End: 2022-02-02

## 2022-02-02 NOTE — TELEPHONE ENCOUNTER
Called patient to reschedule Her appointment since DKO will be OOO 9/15/22  I also offered to schedule MRI with Patient and provided Central Scheduling's number

## 2022-03-11 ENCOUNTER — APPOINTMENT (EMERGENCY)
Dept: CT IMAGING | Facility: HOSPITAL | Age: 42
End: 2022-03-11
Payer: MEDICARE

## 2022-03-11 ENCOUNTER — HOSPITAL ENCOUNTER (EMERGENCY)
Facility: HOSPITAL | Age: 42
Discharge: HOME | End: 2022-03-12
Attending: EMERGENCY MEDICINE | Admitting: EMERGENCY MEDICINE
Payer: MEDICARE

## 2022-03-11 ENCOUNTER — APPOINTMENT (EMERGENCY)
Dept: RADIOLOGY | Facility: HOSPITAL | Age: 42
End: 2022-03-11
Payer: MEDICARE

## 2022-03-11 DIAGNOSIS — J18.9 PNEUMONIA: Primary | ICD-10-CM

## 2022-03-11 DIAGNOSIS — R55 SYNCOPE: ICD-10-CM

## 2022-03-11 PROCEDURE — 71046 X-RAY EXAM CHEST 2 VIEWS: CPT

## 2022-03-11 PROCEDURE — 99285 EMERGENCY DEPT VISIT HI MDM: CPT

## 2022-03-11 PROCEDURE — 99285 EMERGENCY DEPT VISIT HI MDM: CPT | Performed by: EMERGENCY MEDICINE

## 2022-03-11 PROCEDURE — 70450 CT HEAD/BRAIN W/O DYE: CPT

## 2022-03-12 VITALS
SYSTOLIC BLOOD PRESSURE: 141 MMHG | TEMPERATURE: 98.4 F | DIASTOLIC BLOOD PRESSURE: 79 MMHG | RESPIRATION RATE: 18 BRPM | OXYGEN SATURATION: 96 % | HEART RATE: 91 BPM

## 2022-03-12 LAB
ALBUMIN SERPL BCP-MCNC: 3.7 G/DL (ref 3.5–5)
ALP SERPL-CCNC: 76 U/L (ref 46–116)
ALT SERPL W P-5'-P-CCNC: 91 U/L (ref 12–78)
ANION GAP SERPL CALCULATED.3IONS-SCNC: 11 MMOL/L (ref 4–13)
AST SERPL W P-5'-P-CCNC: 47 U/L (ref 5–45)
ATRIAL RATE: 89 BPM
ATRIAL RATE: 92 BPM
BASOPHILS # BLD AUTO: 0.07 THOUSANDS/ΜL (ref 0–0.1)
BASOPHILS NFR BLD AUTO: 1 % (ref 0–1)
BILIRUB SERPL-MCNC: 0.25 MG/DL (ref 0.2–1)
BUN SERPL-MCNC: 9 MG/DL (ref 5–25)
CALCIUM SERPL-MCNC: 8.8 MG/DL (ref 8.3–10.1)
CARDIAC TROPONIN I PNL SERPL HS: 3 NG/L
CHLORIDE SERPL-SCNC: 100 MMOL/L (ref 100–108)
CO2 SERPL-SCNC: 24 MMOL/L (ref 21–32)
CREAT SERPL-MCNC: 0.86 MG/DL (ref 0.6–1.3)
EOSINOPHIL # BLD AUTO: 0.14 THOUSAND/ΜL (ref 0–0.61)
EOSINOPHIL NFR BLD AUTO: 1 % (ref 0–6)
ERYTHROCYTE [DISTWIDTH] IN BLOOD BY AUTOMATED COUNT: 13.1 % (ref 11.6–15.1)
GFR SERPL CREATININE-BSD FRML MDRD: 83 ML/MIN/1.73SQ M
GLUCOSE SERPL-MCNC: 117 MG/DL (ref 65–140)
HCG SERPL QL: NEGATIVE
HCT VFR BLD AUTO: 41.1 % (ref 34.8–46.1)
HGB BLD-MCNC: 14.6 G/DL (ref 11.5–15.4)
IMM GRANULOCYTES # BLD AUTO: 0.04 THOUSAND/UL (ref 0–0.2)
IMM GRANULOCYTES NFR BLD AUTO: 0 % (ref 0–2)
LYMPHOCYTES # BLD AUTO: 2.95 THOUSANDS/ΜL (ref 0.6–4.47)
LYMPHOCYTES NFR BLD AUTO: 26 % (ref 14–44)
MCH RBC QN AUTO: 32.9 PG (ref 26.8–34.3)
MCHC RBC AUTO-ENTMCNC: 35.5 G/DL (ref 31.4–37.4)
MCV RBC AUTO: 93 FL (ref 82–98)
MONOCYTES # BLD AUTO: 1.08 THOUSAND/ΜL (ref 0.17–1.22)
MONOCYTES NFR BLD AUTO: 10 % (ref 4–12)
NEUTROPHILS # BLD AUTO: 7.08 THOUSANDS/ΜL (ref 1.85–7.62)
NEUTS SEG NFR BLD AUTO: 62 % (ref 43–75)
NRBC BLD AUTO-RTO: 0 /100 WBCS
P AXIS: 62 DEGREES
P AXIS: 74 DEGREES
PLATELET # BLD AUTO: 265 THOUSANDS/UL (ref 149–390)
PMV BLD AUTO: 9.9 FL (ref 8.9–12.7)
POTASSIUM SERPL-SCNC: 3.4 MMOL/L (ref 3.5–5.3)
PR INTERVAL: 150 MS
PR INTERVAL: 150 MS
PROT SERPL-MCNC: 7.7 G/DL (ref 6.4–8.2)
QRS AXIS: 24 DEGREES
QRS AXIS: 61 DEGREES
QRSD INTERVAL: 86 MS
QRSD INTERVAL: 88 MS
QT INTERVAL: 388 MS
QT INTERVAL: 394 MS
QTC INTERVAL: 479 MS
QTC INTERVAL: 479 MS
RBC # BLD AUTO: 4.44 MILLION/UL (ref 3.81–5.12)
SODIUM SERPL-SCNC: 135 MMOL/L (ref 136–145)
T WAVE AXIS: 82 DEGREES
T WAVE AXIS: 84 DEGREES
VENTRICULAR RATE: 89 BPM
VENTRICULAR RATE: 92 BPM
WBC # BLD AUTO: 11.36 THOUSAND/UL (ref 4.31–10.16)

## 2022-03-12 PROCEDURE — 84484 ASSAY OF TROPONIN QUANT: CPT | Performed by: EMERGENCY MEDICINE

## 2022-03-12 PROCEDURE — 93005 ELECTROCARDIOGRAM TRACING: CPT

## 2022-03-12 PROCEDURE — 84703 CHORIONIC GONADOTROPIN ASSAY: CPT | Performed by: EMERGENCY MEDICINE

## 2022-03-12 PROCEDURE — 85025 COMPLETE CBC W/AUTO DIFF WBC: CPT | Performed by: EMERGENCY MEDICINE

## 2022-03-12 PROCEDURE — 80053 COMPREHEN METABOLIC PANEL: CPT | Performed by: EMERGENCY MEDICINE

## 2022-03-12 PROCEDURE — 96360 HYDRATION IV INFUSION INIT: CPT

## 2022-03-12 PROCEDURE — 93010 ELECTROCARDIOGRAM REPORT: CPT | Performed by: INTERNAL MEDICINE

## 2022-03-12 PROCEDURE — 36415 COLL VENOUS BLD VENIPUNCTURE: CPT | Performed by: EMERGENCY MEDICINE

## 2022-03-12 RX ORDER — ACETAMINOPHEN 325 MG/1
650 TABLET ORAL ONCE
Status: COMPLETED | OUTPATIENT
Start: 2022-03-12 | End: 2022-03-12

## 2022-03-12 RX ORDER — DOXYCYCLINE HYCLATE 100 MG/1
100 CAPSULE ORAL EVERY 12 HOURS SCHEDULED
Qty: 14 CAPSULE | Refills: 0 | Status: SHIPPED | OUTPATIENT
Start: 2022-03-12 | End: 2022-03-19

## 2022-03-12 RX ORDER — DOXYCYCLINE HYCLATE 100 MG/1
100 CAPSULE ORAL ONCE
Status: COMPLETED | OUTPATIENT
Start: 2022-03-12 | End: 2022-03-12

## 2022-03-12 RX ADMIN — SODIUM CHLORIDE 1000 ML: 9 INJECTION, SOLUTION INTRAVENOUS at 00:10

## 2022-03-12 RX ADMIN — DOXYCYCLINE 100 MG: 100 CAPSULE ORAL at 00:47

## 2022-03-12 RX ADMIN — ACETAMINOPHEN 650 MG: 325 TABLET ORAL at 00:47

## 2022-03-12 NOTE — ED PROVIDER NOTES
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 Taking?    Biotin w/ Vitamins C & E (HAIR/SKIN/NAILS PO)  Self Yes No   Sig: Take by mouth   COVID-19 mRNA Virus Vaccine (MODERNA COVID-19 VACCINE IM)  Self Yes No   Sig: Inject into a muscle 1st dose received 4/21/21 2nd dose planned for 5/26/21   Coenzyme Q10 (COQ-10 PO)  Self Yes No   Sig: Take by mouth daily   acetaminophen (TYLENOL) 500 mg tablet  Self Yes No   Sig: Take 1,000 mg by mouth every 6 (six) hours as needed for mild pain   azelastine (OPTIVAR) 0 05 % ophthalmic solution  Self Yes No   Sig: Apply 1 drop to eye as needed   buPROPion (WELLBUTRIN XL) 150 mg 24 hr tablet  Self Yes No   Sig: Take 150 mg by mouth daily   famotidine (PEPCID) 40 MG tablet  Self Yes No   Sig: Take 40 mg by mouth daily   levocetirizine (XYZAL) 5 MG tablet  Self Yes No   meclizine (ANTIVERT) 25 mg tablet  Self No No   Sig: Take 1 tablet (25 mg total) by mouth 3 (three) times a day as needed for dizziness for up to 15 doses   methocarbamol (ROBAXIN) 750 mg tablet  Self No No   Sig: Take 1 tablet (750 mg total) by mouth every 6 (six) hours as needed for muscle spasms   naproxen (NAPROSYN) 500 mg tablet  Self Yes No   ondansetron (ZOFRAN-ODT) 4 mg disintegrating tablet  Self No No   Sig: Take 1 tablet (4 mg total) by mouth every 6 (six) hours as needed for nausea or vomiting for up to 20 doses   senna-docusate sodium (SENOKOT S) 8 6-50 mg per tablet  Self No No   Sig: Take 2 tablets by mouth daily as needed for constipation   sertraline (ZOLOFT) 50 mg tablet  Self Yes No   Sig: Take 100 mg by mouth daily    simvastatin (ZOCOR) 40 mg tablet  Self Yes No   Sig: Take 40 mg by mouth   traZODone (DESYREL) 50 mg tablet  Self Yes No      Facility-Administered Medications: None       Past Medical History:   Diagnosis Date    Always tired     Anesthesia     "woke up during one ECT treatment also during wisdom teeth"    Balance problems     Bilateral numbness and tingling of arms and legs     "most of the time"    Chest pain      a few months ago went to ER [facility]," had testing and told everything ok"    Chronic pain disorder     Depressive disorder     Dizziness     "on way to work and felt like going to pass out, to ER and had CT SCAN/MRI brain; found brain mass"    Family history of bleeding disorder     "pt reports Mom has clotting problem"    GERD (gastroesophageal reflux disease)     H1N1 influenza with pneumonia 2011    Headache     Joint pain     neck and shoulders    Migraine     occas    Muscle weakness     Nausea & vomiting     occas    Risk for falls     Shortness of breath     "on exertion feels related to weight gain"    Swollen feet     Tinnitus     Wears glasses     and contacts; will wear glasses DOS    Word finding difficulty     "feels words dont come quickly and also forgetful"       Past Surgical History:   Procedure Laterality Date    COLONOSCOPY      ELECTROCONVULSIVE THERAPY      19 treatments    HYSTERECTOMY      SD EXCIS TRANSTEMP CP ANGLE TUMOR Left 5/10/2021    Procedure: left 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;  Surgeon: Phuong Hedrick MD;  Location: [facility];  Service: Neurosurgery    WISDOM TOOTH EXTRACTION         Family History   Problem Relation Age of Onset    Bleeding Disorder Mother     Fibromyalgia Mother     Cervical cancer Mother     Alcohol abuse Father     Depression Father     Stroke Father     Hyperlipidemia Brother     Heart failure Paternal Grandmother     Hyperlipidemia Paternal Grandmother      I have reviewed and agree with the history as documented  E-Cigarette/Vaping    E-Cigarette Use Never User      E-Cigarette/Vaping Substances     Social History     Tobacco Use    Smoking status: Current Every Day Smoker     Packs/day: 0 33     Types: Cigarettes    Smokeless tobacco: Never Used    Tobacco comment: has quit in the past will try "cold turkey"   Vaping Use    Vaping Use: Never used   Substance Use Topics    Alcohol use: Yes     Comment: "has cut back"    Drug use: Not Currently     Types: Marijuana     Comment: occasional       Review of Systems   Constitutional: Negative for chills and fever  HENT: Negative for dental problem and ear pain  Eyes: Negative for pain and redness  Respiratory: Positive for cough  Negative for shortness of breath  Cardiovascular: Negative for chest pain and palpitations  Gastrointestinal: Negative for abdominal pain and nausea  Endocrine: Negative for polydipsia and polyphagia  Genitourinary: Negative for dysuria and frequency  Musculoskeletal: Negative for arthralgias and joint swelling  Skin: Negative for color change and rash  Neurological: Positive for syncope  Negative for dizziness  Psychiatric/Behavioral: Negative for behavioral problems and confusion  All other systems reviewed and are negative  Physical Exam  Physical Exam  Vitals and nursing note reviewed  Constitutional:       General: She is not in acute distress  Appearance: She is well-developed  She is not diaphoretic  HENT:      Head: Atraumatic        Right Ear: External ear normal       Left Ear: External ear normal       Nose: Nose normal    Eyes:      Conjunctiva/sclera: Conjunctivae normal  Pupils: Pupils are equal, round, and reactive to light  Neck:      Vascular: No JVD  Cardiovascular:      Rate and Rhythm: Normal rate and regular rhythm  Heart sounds: Normal heart sounds  No murmur heard  Pulmonary:      Effort: Pulmonary effort is normal  No respiratory distress  Breath sounds: Normal breath sounds  No wheezing  Abdominal:      General: Bowel sounds are normal  There is no distension  Palpations: Abdomen is soft  Tenderness: There is no abdominal tenderness  Musculoskeletal:         General: Normal range of motion  Cervical back: Normal range of motion and neck supple  Skin:     General: Skin is warm and dry  Capillary Refill: Capillary refill takes less than 2 seconds  Neurological:      Mental Status: She is alert and oriented to person, place, and time  Cranial Nerves: No cranial nerve deficit  Comments: CN II-XII intact grossly, no focal deficits  Normal strength and sensation in b/l upper and lower extremities   Normal FNF and rapid alternating hand movements   Psychiatric:         Behavior: Behavior normal          Vital Signs  ED Triage Vitals   Temperature Pulse Respirations Blood Pressure SpO2   03/11/22 2330 03/11/22 2329 03/11/22 2329 03/11/22 2329 03/11/22 2329   98 4 °F (36 9 °C) 104 19 145/79 96 %      Temp Source Heart Rate Source Patient Position - Orthostatic VS BP Location FiO2 (%)   03/11/22 2330 03/11/22 2329 03/11/22 2329 03/11/22 2329 --   Oral Monitor Lying Right arm       Pain Score       --                  Vitals:    03/11/22 2329 03/12/22 0030   BP: 145/79 141/79   Pulse: 104 91   Patient Position - Orthostatic VS: Lying          Visual Acuity  Visual Acuity      Most Recent Value   L Pupil Size (mm) 3   R Pupil Size (mm) 3          ED Medications  Medications   sodium chloride 0 9 % bolus 1,000 mL (0 mL Intravenous Stopped 3/12/22 0100)   doxycycline hyclate (VIBRAMYCIN) capsule 100 mg (100 mg Oral Given 3/12/22 6445)   acetaminophen (TYLENOL) tablet 650 mg (650 mg Oral Given 3/12/22 0047)       Diagnostic Studies  Results Reviewed     Procedure Component Value Units Date/Time    Pregnancy Test (HCG Qualitative) [[id number]]  (Normal) Collected: 03/12/22 0010    Lab Status: Final result Specimen: Blood from Arm, Right Updated: 03/12/22 0048     Preg, Serum Negative    HS Troponin 0hr (reflex protocol) [[id number]]  (Normal) Collected: 03/12/22 0010    Lab Status: Final result Specimen: Blood from Arm, Right Updated: 03/12/22 0048     hs TnI 0hr 3 ng/L     Comprehensive metabolic panel [[id number]]  (Abnormal) Collected: 03/12/22 0010    Lab Status: Final result Specimen: Blood from Arm, Right Updated: 03/12/22 0042     Sodium 135 mmol/L      Potassium 3 4 mmol/L      Chloride 100 mmol/L      CO2 24 mmol/L      ANION GAP 11 mmol/L      BUN 9 mg/dL      Creatinine 0 86 mg/dL      Glucose 117 mg/dL      Calcium 8 8 mg/dL      AST 47 U/L      ALT 91 U/L      Alkaline Phosphatase 76 U/L      Total Protein 7 7 g/dL      Albumin 3 7 g/dL      Total Bilirubin 0 25 mg/dL      eGFR 83 ml/min/1 73sq m     Narrative:      [place] guidelines for Chronic Kidney Disease (CKD):     Stage 1 with normal or high GFR (GFR > 90 mL/min/1 73 square meters)    Stage 2 Mild CKD (GFR = 60-89 mL/min/1 73 square meters)    Stage 3A Moderate CKD (GFR = 45-59 mL/min/1 73 square meters)    Stage 3B Moderate CKD (GFR = 30-44 mL/min/1 73 square meters)    Stage 4 Severe CKD (GFR = 15-29 mL/min/1 73 square meters)    Stage 5 End Stage CKD (GFR <15 mL/min/1 73 square meters)  Note: GFR calculation is accurate only with a steady state creatinine    CBC and differential [[id number]]  (Abnormal) Collected: 03/12/22 0011    Lab Status: Final result Specimen: Blood from Arm, Right Updated: 03/12/22 0018     WBC 11 36 Thousand/uL      RBC 4 44 Million/uL      Hemoglobin 14 6 g/dL      Hematocrit 41 1 %      MCV 93 fL      MCH 32 9 pg MCHC 35 5 g/dL      RDW 13 1 %      MPV 9 9 fL      Platelets 316 Thousands/uL      nRBC 0 /100 WBCs      Neutrophils Relative 62 %      Immat GRANS % 0 %      Lymphocytes Relative 26 %      Monocytes Relative 10 %      Eosinophils Relative 1 %      Basophils Relative 1 %      Neutrophils Absolute 7 08 Thousands/µL      Immature Grans Absolute 0 04 Thousand/uL      Lymphocytes Absolute 2 95 Thousands/µL      Monocytes Absolute 1 08 Thousand/µL      Eosinophils Absolute 0 14 Thousand/µL      Basophils Absolute 0 07 Thousands/µL                  CT head without contrast   Final Result by Foreign Patel DO (03/12 0024)      No acute intracranial abnormality  Workstation performed: [id number]         XR chest 2 views    (Results Pending)              Procedures  ECG 12 Lead Documentation Only    Date/Time: 3/12/2022 12:23 AM  Performed by: Sathya Tate MD  Authorized by: Sathya Tate MD     Comments:      NSR rate of 92 normal axis and intervals no acute ST elevations or depressions             ED Course                               SBIRT 20yo+      Most Recent Value   SBIRT (25 yo +)    In order to provide better care to our patients, we are screening all of our patients for alcohol and drug use  Would it be okay to ask you these screening questions? Yes Filed at: 03/12/2022 0011   Initial Alcohol Screen: US AUDIT-C     1  How often do you have a drink containing alcohol? 3 Filed at: 03/12/2022 0011   2  How many drinks containing alcohol do you have on a typical day you are drinking? 2 Filed at: 03/12/2022 0011   3b  FEMALE Any Age, or MALE 65+: How often do you have 4 or more drinks on one occassion? 0 Filed at: 03/12/2022 0011   Audit-C Score 5 Filed at: 03/12/2022 0011   LOULOU: How many times in the past year have you    Used an illegal drug or used a prescription medication for non-medical reasons?  Never Filed at: 03/12/2022 0011                    MDM  Patient presents with syncopal episode and cough  EKG and troponin negative  She has a normal neuro exam   Her CT head is negative for acute abnormality  Chest x-ray with right-sided infiltrate, will treat for possible pneumonia given her cough  Disposition  Final diagnoses:   Pneumonia   Syncope     Time reflects when diagnosis was documented in both MDM as applicable and the Disposition within this note     Time User Action Codes Description Comment    3/12/2022 12:34 AM Chetna Raya Add [J18 9] Pneumonia     3/12/2022 12:34 AM Chetna Raya Add [R55] Syncope       ED Disposition     ED Disposition Condition Date/Time Comment    Discharge Stable Sat Mar 12, 2022 12:56 AM Severo Saucier discharge to home/self care              Follow-up Information     Follow up With Specialties Details Why Rip Tran MD Family Medicine Schedule an appointment as soon as possible for a visit   [address] [place] [address]  [phone]            Discharge Medication List as of 3/12/2022 12:56 AM      START taking these medications    Details   doxycycline hyclate (VIBRAMYCIN) 100 mg capsule Take 1 capsule (100 mg total) by mouth every 12 (twelve) hours for 7 days, Starting Sat 3/12/2022, Until Sat 3/19/2022, Print         CONTINUE these medications which have NOT CHANGED    Details   acetaminophen (TYLENOL) 500 mg tablet Take 1,000 mg by mouth every 6 (six) hours as needed for mild pain, Historical Med      azelastine (OPTIVAR) 0 05 % ophthalmic solution Apply 1 drop to eye as needed, Starting Thu 9/10/2020, Until Fri 9/10/2021, Historical Med      Biotin w/ Vitamins C & E (HAIR/SKIN/NAILS PO) Take by mouth, Historical Med      buPROPion (WELLBUTRIN XL) 150 mg 24 hr tablet Take 150 mg by mouth daily, Historical Med      Coenzyme Q10 (COQ-10 PO) Take by mouth daily, Historical Med      COVID-19 mRNA Virus Vaccine (MODERNA COVID-19 VACCINE IM) Inject into a muscle 1st dose received 4/21/21 2nd dose planned for 5/26/21, Historical Med      famotidine (PEPCID) 40 MG tablet Take 40 mg by mouth daily, Starting Fri 8/21/2020, Until Thu 9/2/2021, Historical Med      levocetirizine (XYZAL) 5 MG tablet Starting Wed 8/11/2021, Historical Med      meclizine (ANTIVERT) 25 mg tablet Take 1 tablet (25 mg total) by mouth 3 (three) times a day as needed for dizziness for up to 15 doses, Starting Thu 3/19/2020, Print      methocarbamol (ROBAXIN) 750 mg tablet Take 1 tablet (750 mg total) by mouth every 6 (six) hours as needed for muscle spasms, Starting Tue 5/25/2021, Normal      naproxen (NAPROSYN) 500 mg tablet Starting Fri 8/20/2021, Historical Med      ondansetron (ZOFRAN-ODT) 4 mg disintegrating tablet Take 1 tablet (4 mg total) by mouth every 6 (six) hours as needed for nausea or vomiting for up to 20 doses, Starting Mon 4/12/2021, Normal      senna-docusate sodium (SENOKOT S) 8 6-50 mg per tablet Take 2 tablets by mouth daily as needed for constipation, Starting Tue 5/25/2021, Normal      sertraline (ZOLOFT) 50 mg tablet Take 100 mg by mouth daily , Starting Mon 4/5/2021, Historical Med      simvastatin (ZOCOR) 40 mg tablet Take 40 mg by mouth, Starting Wed 6/2/2021, Until Thu 6/2/2022 at 2359, Historical Med      traZODone (DESYREL) 50 mg tablet Starting Sun 8/22/2021, Historical Med             No discharge procedures on file      PDMP Review       Value Time User    PDMP Reviewed  Yes 5/16/2021 12:23 PM Nilton Turner PA-C          ED Provider  Electronically Signed by           Mary Dolan MD  03/12/22 0773

## 2022-03-14 ENCOUNTER — TELEPHONE (OUTPATIENT)
Dept: NEUROSURGERY | Facility: CLINIC | Age: 42
End: 2022-03-14

## 2022-03-14 NOTE — TELEPHONE ENCOUNTER
Patient called the office reporting she had a possible seizure over the weekend  Her wife, Arturo, witnessed the event  During the event, the patient clenched up where her eyes were wide opened and she lost control of her upper body while sitting on the couch  She does not remember the event, and it lasted for a few minutes  She describes it as a staring glaze episode  She has never experienced an event like this in the past  Patient was not oriented to self, place, time, or situation during the event  She went to the ER where they contributed the event to a syncope event and diagnosed her with pneumonia  Patient does not agree with the syncope diagnosis and how she believes it was a seizure  Patient is scheduled to see her PCP today  Patient is scheduled for a one year follow with MRI brain with Dr Nico Murrieta in 9/2022 for follow up regarding CP angle mass  Advised patient this RN will send a message to Dr Nico Murrieta to see if he has any recommendations  Patient denies any new signs/symptoms that are off from her usual baseline  Patient is aware to present to the ED if a similar event occurs again  Patient was appreciative

## 2022-03-16 ENCOUNTER — TELEPHONE (OUTPATIENT)
Dept: NEUROSURGERY | Facility: CLINIC | Age: 42
End: 2022-03-16

## 2022-03-16 NOTE — TELEPHONE ENCOUNTER
PATIENT CALLED BACK THE CALL CENTER TO FOLLOW UP WITH SEEING DKO SOONER WITH NEW & WORSENING SX PLEASE SEE TELEPHONE ENCOUNTER ON 3/14/22  SHE MENTIONED TO ME SHE HAS UPCOMING EEG SCHEDULED FOR Tuesday AND A CONSULT WITH NEUROLOGY ON 6/2022  PT IS CONCERNED AND WOULD PREFER TO SEE DKO ASAP  PT PHONE NUMBER [phone]  SENT MESSAGE TO NURSE LINE  PT ALSO NOTED IN THE VM THAT SHE NEEDED MEDICAL RECORDS SENT TO NEUROLOGY FOR UPCOMING CONSULT 6/2022 HOWEVER SHE TOLD ME OVER THE PHONE SHE WILL CALL BACK FOR THAT REQUEST SHE WOULD PREFER TO SEE DKO FIRST

## 2022-03-17 NOTE — TELEPHONE ENCOUNTER
Patient called the office inquiring if we heard back from Dr Ira Victoria  Advised patient this message was sent and we are awaiting for his reply  Inquired if she is experiencing any new or worsening signs/symptoms  Patient said no and just referred to the possible seizure event she had on 3/11/22 when she went to the ER  Patient is scheduled for a EEG tomorrow at [facility] and an appointment with a neurologist in 6/2022 at [facility]  Advised patient to keep these appointments  Advised patient this RN will call her as soon as we hear back from Dr Ira Victoria  She was very appreciative

## 2022-09-07 DIAGNOSIS — F40.240 CLAUSTROPHOBIA: Primary | ICD-10-CM

## 2022-09-07 RX ORDER — ALPRAZOLAM 0.5 MG/1
TABLET ORAL
Qty: 2 TABLET | Refills: 0 | Status: SHIPPED | OUTPATIENT
Start: 2022-09-07

## 2022-09-07 NOTE — TELEPHONE ENCOUNTER
Received a call from patient stating she get anxious for her MRIs and is questioning if something could be called in for her  Returned call to patient and advised we typically order 2 tablets of xanax  Reviewed the instructions with patient and confirmed her preferred pharmacy  Informed patient this RN will route to a provider to sign off  She stated an understanding and was appreciative of the call back

## 2022-09-13 ENCOUNTER — TELEPHONE (OUTPATIENT)
Dept: NEUROSURGERY | Facility: CLINIC | Age: 42
End: 2022-09-13

## 2022-09-13 NOTE — TELEPHONE ENCOUNTER
9/13/22  L/M FOR PT TO CALL CENTRAL SCHEDULING TO R/S MRI BRAIN (2 WERE CANCELLED BY PT)    THEN CALL OFFICE TO R/S F/U WITH DKO

## 2022-10-13 ENCOUNTER — HOSPITAL ENCOUNTER (OUTPATIENT)
Dept: RADIOLOGY | Facility: HOSPITAL | Age: 42
Discharge: HOME | End: 2022-10-13
Attending: NEUROLOGICAL SURGERY
Payer: MEDICARE

## 2022-10-13 ENCOUNTER — HOSPITAL ENCOUNTER (OUTPATIENT)
Dept: RADIOLOGY | Facility: HOSPITAL | Age: 42
Discharge: HOME | End: 2022-10-13
Payer: MEDICARE

## 2022-10-13 DIAGNOSIS — D33.3 CEREBELLOPONTINE ANGLE TUMOR (HCC): ICD-10-CM

## 2022-10-13 DIAGNOSIS — Z96.89 S/P PLACEMENT OF VNS (VAGUS NERVE STIMULATION) DEVICE: ICD-10-CM

## 2022-10-13 PROCEDURE — 70553 MRI BRAIN STEM W/O & W/DYE: CPT

## 2022-10-13 PROCEDURE — G1004 CDSM NDSC: HCPCS

## 2022-10-13 PROCEDURE — 95976 ALYS SMPL CN NPGT PRGRMG: CPT | Performed by: PSYCHIATRY & NEUROLOGY

## 2022-10-13 PROCEDURE — A9585 GADOBUTROL INJECTION: HCPCS | Performed by: NEUROLOGICAL SURGERY

## 2022-10-13 RX ADMIN — GADOBUTROL 13 ML: 604.72 INJECTION INTRAVENOUS at 14:39

## 2022-10-13 NOTE — PROCEDURES
VNS Adjustment Procedure    Patient presented to [address] today for an MRI appointment  VNS off at: 13:52 on 10/13/2022  Output current was changed to 0 mA  (no autostimulation or Magnet functions activated on this device, as used for depression)    VNS on at: 14:35 on 10/13/2022  Settings were restored to the following: Output current 2 mA   Signal frequency 20 Hz   Pulse width 250 ìsec   On time 30 sec   Off time 5 min     Battery:  %    The patient tolerated these changes without any problems      Mihir Wiggins MD

## 2022-11-07 NOTE — ASSESSMENT & PLAN NOTE
Jan Mix (: 1960) is a 57 y.o. female, established patient, here for evaluation of the following chief complaint(s):  Sinus Infection (Experiencing cough, congestion, chills, sore throat x 2 days. An Madisyn pain after taking Tylenol Severe Sinus.  )         ASSESSMENT/PLAN:  Below is the assessment and plan developed based on review of pertinent history, physical exam, labs, studies, and medications. 1. Viral illness  Vitals look good and lungs CTA  Viral sx x 48 hours, strep negative   No flu or covid tests in office, she has home covid test   Start tamiflu for empiric tx for influenza as this is very prevalent right now and patient has not been vaccinated and is at risk for progression to severe disease with T2DM and CHF  -advised to obtain covid test and call if this is positive   -zofran, tessalon and mucinex for sx relief. Can take delsym if preferred over tessalon. Tylenol for pain/fever   -rest  -hydrate well  Isolate from others for 5 days since sx onset in case it is covid   -return if sx worsen or fail to improve, ED precautions for chest pain, SOB, high fever   -hold stool softener until diarrhea resolves   -     oseltamivir (TAMIFLU) 75 mg capsule; Take 1 Capsule by mouth two (2) times a day for 5 days. , Normal, Disp-10 Capsule, R-0  -     benzonatate (TESSALON) 200 mg capsule; Take 1 Capsule by mouth three (3) times daily as needed for Cough for up to 7 days. , Normal, Disp-21 Capsule, R-0  -     ondansetron (ZOFRAN ODT) 4 mg disintegrating tablet; Take 1 Tablet by mouth every eight (8) hours as needed for Nausea or Vomiting., Normal, Disp-20 Tablet, R-0  -     guaiFENesin ER (MUCINEX) 600 mg ER tablet; Take 1 Tablet by mouth two (2) times a day., Normal, Disp-12 Tablet, R-0  -     dextromethorphan (Delsym) 30 mg/5 mL liquid; Take 10 mL by mouth two (2) times a day for 10 days. , Normal, Disp-200 mL, R-0  2.  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)  · Pre op complains of pain behind her left eye, headaches, and occasionally diplopia  She also had numbness and tingling bilaterally in her arms, RUE weakness  and developed some lightheadedness with balance difficulties  · Pathology pending    Imaging:    CT head wo 5/11/21:Status post left retromastoid craniotomy for mass resection  There is hemorrhage within the left lateral prepontine cistern corresponding to the site of the previous mass  Small amount of air and fluid is present  There appears to be a small parenchymal hemorrhage within the adjacent left lateral lauren, measuring approximately 1 1 cm in maximum oblique dimension  Small amount of subarachnoid hemorrhage is seen in the right parafalcine region posteriorly and extending along the superior tentorium  MRI brain w/wo 5/11/21: 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  There is residual tumor identified as seen on series 10 image 7 and series 1100, image 25  There is edema and hemorrhage within the left lateral aspect of the lauren as well as a smaller focus of hemorrhage within the posterior central aspect of the brain  Mild associated edema  There is a small amount of acute subdural blood seen posteriorly within both hemispheres measuring approximately 2 mm in thickness  There is mild reactive dural thickening and enhancement seen diffusely  Plan:  · Continue frequent neurological checks  · Reviewed imaging with patient   · Ordered CT head for tomorrow morning to revaluate hemorrhages   · STAT CT head with any neurological decline including drop GCS of 2pts within 1 hr   · Recommend SBP <160mmHg    · Keppra 750mg Q 12H for seziure ppx   · Continue 72 hour Decadron taper  · Medical management and pain control per primary team  · Mobilize with PT/OT  · Speech eval, cleared patient for full liquids and improve     SUBJECTIVE/OBJECTIVE:  Chief Complaint   Patient presents with    Sinus Infection     Experiencing cough, congestion, chills, sore throat x 2 days. Stomach pain after taking Tylenol Severe Sinus. Patient presents to office for acute illness. 48 hours ago she felt chills. Then sinus congestion started. Nasal congestion and sinus pressure. Cough started yesterday-- dry and intermittent. Feels nauseous with post nasal drip. No fever. Yes chills   No ear pain. This AM when she sat up she had to take some deep breaths to clear out nasal passages, no other shortness of breath. She has tylenol severe sinus she took and it made her sick to her stomach. Today she had some diarrhea, but she has stool softener she has been taking. Once vomited yesterday after she took the cold and sinus med. No wheezing. No body aches. No one around her is sick  No flu shot yet this year. She has had covid vaccine. No home covid tests. Review of systems negative other than mentioned in HPI    Current Outpatient Medications on File Prior to Visit   Medication Sig Dispense Refill    furosemide (LASIX) 40 mg tablet TAKE 1 TABLET BY MOUTH EVERY DAY 30 Tablet 1    atorvastatin (LIPITOR) 40 mg tablet Take 1 Tablet by mouth daily. 90 Tablet 1    losartan (COZAAR) 50 mg tablet TAKE 1 TABLET BY MOUTH EVERY DAY 90 Tablet 1    HYDROcodone-acetaminophen (NORCO) 5-325 mg per tablet Take 1 Tablet by mouth every four (4) hours as needed for Pain.      metFORMIN (GLUCOPHAGE) 500 mg tablet TAKE 2 TABLETS BY MOUTH TWICE A DAY WITH MEALS 120 Tablet 2     No current facility-administered medications on file prior to visit.      Patient Active Problem List    Diagnosis Date Noted    Arthritis of left knee 08/10/2022    Heart murmur, systolic 46/67/7224    Lymphadenopathy, inguinal 03/24/2022    History of partial nephrectomy 09/30/2020    Renal cancer, left ([facility] [address] 09/14/2020    Controlled type 2 diabetes mellitus without thins     · DVT PPX: SCDs only at this time  Would recommend additional CT head for stability prior to initiation of pharmacological DVT PPX  Neurosurgery will continue to follow closely, patient needs to remain in ICU, call with any questions or concerns  complication, without long-term current use of insulin ([facility] [address] 06/22/2020    Hyperlipidemia 01/21/2020    Morbid obesity ([facility] [address] 09/24/2019    Chronic diastolic congestive heart failure ([facility] [address] 04/17/2018    Osteoarthritis of knee 04/13/2017    Essential hypertension 04/13/2017    Allergy to penicillin 04/13/2017    S/P vaginal hysterectomy 07/19/2012    Stress incontinence in female 07/12/2012    Pelvic relaxation disorder 07/12/2012     Allergies   Allergen Reactions    Other Food Itching     Requires hypoallergenic sheets when hospitalized (from 2020 in [address] [place] [address])    Shellfish Containing Products Anaphylaxis      Mouth and throat swelling with smoked oysters.  But can tolerate shrimp and crabs      Hydromorphone Other (comments)    Cephalexin Rash     Ancef challenge on 8/10/22    Doxycycline Nausea Only    Hydrocodone-Acetaminophen Itching    Meperidine Itching    Oxycodone-Acetaminophen Hives and Rash    Penicillins Hives and Itching    Propoxyphene Hives and Rash    Propoxyphene N-Acetaminophen Hives and Rash    Sulfa (Sulfonamide Antibiotics) Itching    Triamterene-Hydrochlorothiazid Myalgia     Past Surgical History:   Procedure Laterality Date    HX HEART CATHETERIZATION  2020    HX HYSTERECTOMY  2006    approx date    HX KNEE REPLACEMENT Left     HX NEPHRECTOMY  09/09/2020    Robotic assisted laparoscopic left partial nephrectomy    HX OTHER SURGICAL       Social History     Tobacco Use    Smoking status: Never    Smokeless tobacco: Never   Vaping Use    Vaping Use: Never used   Substance Use Topics    Alcohol use: Yes     Comment: once monthly    Drug use: Never     Family History   Problem Relation Age of Onset    Lung Cancer Father     Heart Disease Maternal Grandmother     Heart Disease Mother     Breast Cancer Paternal Aunt      Vitals:    11/07/22 1430   BP: 124/77   Pulse: 73   Resp: 18   Temp: 98.5 °F (36.9 °C)   TempSrc: Oral   SpO2: 99%   Weight: 198 lb 3.2 oz (89.9 kg)   Height: 5' 2.5\" (1.588 m)   PainSc:   7   PainLoc: Head        Physical Exam  Constitutional:       Appearance: Normal appearance. HENT:      Head: Normocephalic and atraumatic. Right Ear: Tympanic membrane, ear canal and external ear normal.      Left Ear: Tympanic membrane, ear canal and external ear normal.      Nose: Congestion present. Mouth/Throat:      Pharynx: No oropharyngeal exudate or posterior oropharyngeal erythema. Eyes:      Extraocular Movements: Extraocular movements intact. Conjunctiva/sclera: Conjunctivae normal.      Pupils: Pupils are equal, round, and reactive to light. Cardiovascular:      Rate and Rhythm: Normal rate and regular rhythm. Pulses: Normal pulses. Heart sounds: Normal heart sounds. Pulmonary:      Effort: Pulmonary effort is normal. No respiratory distress. Breath sounds: Normal breath sounds. No wheezing or rales. Chest:      Chest wall: No tenderness. Abdominal:      General: Abdomen is flat. Bowel sounds are normal.      Palpations: Abdomen is soft. Musculoskeletal:      Cervical back: Normal range of motion and neck supple. No rigidity. Right lower leg: No edema. Left lower leg: No edema. Lymphadenopathy:      Cervical: No cervical adenopathy. Neurological:      General: No focal deficit present. Mental Status: She is alert and oriented to person, place, and time. Psychiatric:         Mood and Affect: Mood normal.         Behavior: Behavior normal.           An electronic signature was used to authenticate this note.   -- Virginia Morgan PA-C

## 2022-11-21 ENCOUNTER — TELEPHONE (OUTPATIENT)
Dept: NEUROSURGERY | Facility: CLINIC | Age: 42
End: 2022-11-21

## 2022-11-21 NOTE — TELEPHONE ENCOUNTER
Received a call from patient looking to update our office that she had a seizure on Friday 11/18  Returned call to patient and directed she contact her neurologist as they are the ones managing her seizures  She stated an understanding and reports that she did update her PCP  Reviewed upcoming appt information with patient  She stated an understanding and was appreciative of the call back 
[employer]

## 2022-12-16 ENCOUNTER — TELEPHONE (OUTPATIENT)
Dept: NEUROSURGERY | Facility: CLINIC | Age: 42
End: 2022-12-16

## 2022-12-21 NOTE — TELEPHONE ENCOUNTER
If patient cannot come to office for visit 2/2 medical reasons then we have to do virtual  Virtual appointment on Epic if patient has My Chart access

## 2022-12-22 ENCOUNTER — TELEMEDICINE (OUTPATIENT)
Dept: NEUROSURGERY | Facility: CLINIC | Age: 42
End: 2022-12-22

## 2022-12-22 VITALS — BODY MASS INDEX: 47.8 KG/M2 | HEIGHT: 64 IN | WEIGHT: 280 LBS

## 2022-12-22 DIAGNOSIS — R26.89 BALANCE PROBLEM: ICD-10-CM

## 2022-12-22 DIAGNOSIS — H54.7 FUNCTIONAL VISION PROBLEM: Primary | ICD-10-CM

## 2022-12-22 DIAGNOSIS — D33.3 CEREBELLOPONTINE ANGLE TUMOR (HCC): ICD-10-CM

## 2022-12-22 DIAGNOSIS — R26.9 GAIT DISTURBANCE: ICD-10-CM

## 2022-12-22 DIAGNOSIS — D32.0 MENINGIOMA, CEREBRAL (HCC): ICD-10-CM

## 2022-12-22 DIAGNOSIS — Z98.890 STATUS POST CRANIOTOMY: ICD-10-CM

## 2022-12-22 RX ORDER — TOPIRAMATE 100 MG/1
100 TABLET, FILM COATED ORAL 2 TIMES DAILY
COMMUNITY
Start: 2022-11-04

## 2022-12-22 RX ORDER — ORAL SEMAGLUTIDE 14 MG/1
1 TABLET ORAL DAILY
COMMUNITY
Start: 2022-11-22

## 2022-12-22 RX ORDER — CYCLOBENZAPRINE HCL 10 MG
10 TABLET ORAL 3 TIMES DAILY PRN
COMMUNITY
Start: 2022-12-19

## 2022-12-22 RX ORDER — LAMOTRIGINE 150 MG/1
150 TABLET ORAL 2 TIMES DAILY
COMMUNITY
Start: 2022-11-21

## 2022-12-22 RX ORDER — PREDNISONE 20 MG/1
1 TABLET ORAL 2 TIMES DAILY
COMMUNITY
Start: 2022-12-19

## 2022-12-22 RX ORDER — PREGABALIN 200 MG/1
200 CAPSULE ORAL 2 TIMES DAILY
COMMUNITY
Start: 2022-12-08

## 2022-12-22 RX ORDER — NIRMATRELVIR AND RITONAVIR 300-100 MG
KIT ORAL
COMMUNITY
Start: 2022-12-19

## 2022-12-22 RX ORDER — SUMATRIPTAN 100 MG/1
100 TABLET, FILM COATED ORAL AS NEEDED
COMMUNITY
Start: 2022-12-13

## 2022-12-22 NOTE — PROGRESS NOTES
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[address] )     · As addressed in HPI   · Multiple complaints similar to preoperative   · Reports left sided V3 V2 branch cranial nerve 5 /trigeminal nerve symptoms of pain in left lower jaw , teeth  Up into left cheek , left lateral mandible into front of left ear   Has sensation left jaw is clicking  Imagining   10/13/2022 MRI Brain w/wo Stable size of left sided meningioma which is located along the left petroclival ligament located anterior and lateral to the left hemipons  This mass is in contact with the left cisternal 5th nerve  Correlate for symptoms of trigeminal neuralgia  8/26/2021 MRI brain w/wo IMPRESSION:1   Stable residual left-sided prepontine meningioma which has been partially resected, locally extending posteriorly along the left tentorial leaflet, intimately associated with the cisternal segment of the left 5th nerve, which courses along the medial   margin of the residual meningioma  No progressive enlargement  Continued clinical and imaging surveillance advised  2   Previously noted small subdural blood products have resorbed  3  No acute intracranial hemorrhage, acute infarction or new mass lesion          Plan  · Reviewed MRI imagining   · Ordered Physical therapy balance training  and assessment   · Ordered Referral to ophthalmology -[facility] specialist  P= [phone]  Fax [phone]---LOPGRV fields etc as detailed in HPI--complet before next visit   · Advised to f/u w/ neurology for EMU admit/moitoring  · Ordered MRI Brain w/wo for completion in 6 months then schedule f/u appointment with Dr Jyotsna almonte dI as snpx            Relevant Orders    MRI brain with and without contrast    Ambulatory Referral to Physical Therapy    Ambulatory Referral to Ophthalmology   Other Visit Diagnoses     Functional vision problem    -  Primary    Relevant Orders    Ambulatory Referral to Ophthalmology    Meningioma, cerebral ([facility] [address] )        Relevant Orders    MRI brain with and without contrast    Status post craniotomy        Relevant Orders    MRI brain with and without contrast    Ambulatory Referral to Ophthalmology    Balance problem        Relevant Orders    Ambulatory Referral to Physical Therapy    Gait disturbance        Relevant Orders    Ambulatory Referral to Physical Therapy               Reason for visit is   Chief Complaint   Patient presents with   • Virtual Regular Visit     1 yr mri brain 10/13/22 ([facility] DAY)        Encounter provider [facility] Luo    Provider located at [address] Lakshmi Barajas [facility] [address]  [phone]      Recent Visits  Date Type Provider Dept   12/16/22 Telephone Zonia Fried RN [facility]   Showing recent visits within past 7 days and meeting all other requirements  Today's Visits  Date Type Provider Dept   12/22/22 Telemedicine Arslan Bertrand [facility] today's visits and meeting all other requirements  Future Appointments  No visits were found meeting these conditions  Showing future appointments within next 150 days and meeting all other requirements       The patient was identified by name and date of birth  Janey Barros was informed that this is a telemedicine visit and that the visit is being conducted through the [employer]  She agrees to proceed     My office door was closed  No one else was in the room  She acknowledged consent and understanding of privacy and security of the video platform  The patient has agreed to participate and understands they can discontinue the visit at any time  Patient is aware this is a billable service       Subjective   1 year f/u imagining with MRI brain status post resection of a large cerebellar pontine angle mass   Via left retromastoid crainotomy    Sidney Melvin is a 38 y o  female    HPI   As a f/u on preoperative complaints  She reports the following-  Eye -Diplopia ---- none   Problems reports worsening vision problems which include focus on objects, has problems focusing  Reports she must take glasses on and off to focus vision  Right eye becomes lazy, has an inward gaze with right eye  Reports worsening near sightedness and far sighted as well  Has intermittent spots before eyes and shadowing  Her last eye examination she reports was about 2 months after surgery    Plan- Order Visual fields and eye examination  Balance she reports feeling off balance before and after since surgery had intermittent episodes of dizziness  associated w/ intermittent balance deficit   Postop has continued intermittent dizziness with constant balance loss  Post op physical therapy was ordered , reports she attended physical therapy for balance training only attended 2 visits secondary to pain  She described cervicalgia and cervical radiculopathy   Chronic pain Reports continued cervical radiculopathy describing pain in cervical spine that radiates down most of right side down right arm and hand into  Reports weakness in right hand is dropping object  Reports intermittent episodes of loss of motor functio in right arm and hand, complete inability to use her right arm  The right arm feels like dead weight  She also reports constant aching in right arm, hand and neck and intermittent in cervical paraspinal area  Has intermittent electric jolt pain in right arm  --  Pain onset --before surgery but has progressed post op     Pain symptom severity as --7/10 daily   Medicinal treatments includes Lyrica, Cyclobenzaprine, naprosyn 500 mg po bid  and prn  ,  PCP order  Order  Physical therapy today  Evaluate and Treat continues balance deficit status pot resection  Of cerebellar pontine mass 2021 , was ordered after surgery , patient reports only attending 2 sessions  Secondary to cervicalgia and cervical radicular symptoms  Demonstrated balance deficits , cannot tandem walk  Or heel has balance loss, ---please provide balance assessment and treatment as per protocol  Lamictal ---started after surgery , first sz in March 2022 --PCP ordered Lamictal , after consultation with neurology     Lightheadedness and headaches ----all the time even now  Is transient and intermittent  Get balloon like feeling in my head , get forgetful and confused  Has never had EMU monitoring  but Dr gillis has recommended , was waitig for this appointment             Past Medical History:   Diagnosis Date   • Always tired    • Anesthesia     "woke up during one ECT treatment also during wisdom teeth"   • Balance problems    • Bilateral numbness and tingling of arms and legs     "most of the time"   • Chest pain      a few months ago went to ER [facility]," had testing and told everything ok"   • Chronic pain disorder    • Depressive disorder    • Dizziness     "on way to work and felt like going to pass out, to ER and had CT SCAN/MRI brain; found brain mass"   • Family history of bleeding disorder     "pt reports Mom has clotting problem"   • GERD (gastroesophageal reflux disease)    • H1N1 influenza with pneumonia 2011   • Headache    • Joint pain     neck and shoulders   • Migraine     occas   • Muscle weakness    • Nausea & vomiting     occas   • Risk for falls    • Shortness of breath     "on exertion feels related to weight gain"   • Swollen feet    • Tinnitus    • Wears glasses     and contacts; will wear glasses DOS   • Word finding difficulty     "feels words dont come quickly and also forgetful"       Past Surgical History:   Procedure Laterality Date   • COLONOSCOPY     • ELECTROCONVULSIVE THERAPY      19 treatments   • HYSTERECTOMY     • PA EXCIS TRANSTEMP CP ANGLE TUMOR Left 5/10/2021    Procedure: left 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;  Surgeon: Morenita Caraballo MD;  [facility]: [facility];  Service: Neurosurgery   • WISDOM TOOTH EXTRACTION         Current Outpatient Medications   Medication Sig Dispense Refill   • acetaminophen (TYLENOL) 500 mg tablet Take 1,000 mg by mouth every 6 (six) hours as needed for mild pain     • azelastine (OPTIVAR) 0 05 % ophthalmic solution Apply 1 drop to eye as needed     • Biotin w/ Vitamins C & E (HAIR/SKIN/NAILS PO) Take by mouth     • Coenzyme Q10 (COQ-10 PO) Take by mouth daily     • cyclobenzaprine (FLEXERIL) 10 mg tablet Take 10 mg by mouth 3 (three) times a day as needed     • famotidine (PEPCID) 40 MG tablet Take 40 mg by mouth daily     • lamoTRIgine (LaMICtal) 150 MG tablet Take 150 mg by mouth 2 (two) times a day     • levocetirizine (XYZAL) 5 MG tablet Take 5 mg by mouth every evening     • meclizine (ANTIVERT) 25 mg tablet Take 1 tablet (25 mg total) by mouth 3 (three) times a day as needed for dizziness for up to 15 doses 15 tablet 0   • naproxen (NAPROSYN) 500 mg tablet Take 500 mg by mouth 2 (two) times a day with meals     • ondansetron (ZOFRAN-ODT) 4 mg disintegrating tablet Take 1 tablet (4 mg total) by mouth every 6 (six) hours as needed for nausea or vomiting for up to 20 doses 20 tablet 0   • Paxlovid, 300/100, tablet therapy pack TAKE 2 TABLETS (NIRMATRELVIR) AND TAKE 1 TABLET (RITONAVIR) BY MOUTH TWICE A DAY FOR 5 DAYS     • predniSONE 20 mg tablet Take 1 tablet by mouth 2 (two) times a day     • pregabalin (LYRICA) 200 MG capsule Take 200 mg by mouth 2 (two) times a day     • Rybelsus 14 MG TABS Take 1 tablet by mouth daily     • senna-docusate sodium (SENOKOT S) 8 6-50 mg per tablet Take 2 tablets by mouth daily as needed for constipation 30 tablet 0   • Sertraline HCl 150 MG CAPS Take 150 mg by mouth daily     • simvastatin (ZOCOR) 40 mg tablet Take 40 mg by mouth     • SUMAtriptan (IMITREX) 100 mg tablet Take 100 mg by mouth if needed     • traZODone (DESYREL) 100 mg tablet Take 100 mg by mouth daily at bedtime as needed     • ALPRAZolam (XANAX) 0 5 mg tablet Take 1 tab 2 hours prior to MRI  May take another tab 30 min prior if needed  (Patient not taking: Reported on 12/22/2022) 2 tablet 0   • buPROPion (WELLBUTRIN XL) 150 mg 24 hr tablet Take 150 mg by mouth daily (Patient not taking: Reported on 12/22/2022)     • COVID-19 mRNA Virus Vaccine (MODERNA COVID-19 VACCINE IM) Inject into a muscle 1st dose received 4/21/21 2nd dose planned for 5/26/21     • methocarbamol (ROBAXIN) 750 mg tablet Take 1 tablet (750 mg total) by mouth every 6 (six) hours as needed for muscle spasms 60 tablet 0   • topiramate (TOPAMAX) 100 mg tablet Take 100 mg by mouth 2 (two) times a day       No current facility-administered medications for this visit  No Known Allergies    Review of Systems   Constitutional: Positive for activity change (starting therapy next week; 2x/week for 6 weeks) and fatigue (worsens vision)  HENT: Positive for dental problem (cavities), rhinorrhea, sinus pressure, sinus pain, tinnitus (b/l ears, L>R) and trouble swallowing (occasional)  Eyes: Positive for photophobia (bright lights) and visual disturbance (frequent double vision which is improving and some trouble focusing in close distance)  Has appointment with eye doctor on Thursday 5/27/21   Respiratory: Positive for cough  Cardiovascular: Negative  Gastrointestinal: Positive for constipation (hx)  Endocrine: Negative  Genitourinary: Negative  Musculoskeletal: Positive for gait problem (worsening since last visit, slightly unsteady), neck pain (L>R slight neck and head pain ) and neck stiffness  Skin: Negative  Allergic/Immunologic: Negative      Neurological: Positive for seizures (facial onset, not aware seizures), speech difficulty (sometimes, slurred words), weakness (right shoulder and right arm, lose complete function, dropping items), light-headedness (occasional and very mild) and headaches (2-3/10)  Hematological: Negative  Psychiatric/Behavioral: Positive for agitation, decreased concentration (worsening since last visit, memory loss short and long term) and sleep disturbance (insomnia)  Video Exam    Vitals:    12/22/22 1133   Weight: 127 kg (280 lb)   Height: 5' 4" (1 626 m)       Physical Exam  Exam conducted with a chaperone present (patient and her wife)  Constitutional:       Comments: BMI 46 06   HENT:      Head: Normocephalic and atraumatic  Pulmonary:      Effort: Pulmonary effort is normal  No respiratory distress  Neurological:      Mental Status: She is alert and oriented to person, place, and time  Coordination: Coordination abnormal       Gait: Gait abnormal and tandem walk abnormal (lost balance w/ a few steps)  Psychiatric:         Mood and Affect: Mood normal          Behavior: Behavior normal       Comments: talkative          Neurologic Exam     Mental Status   Oriented to person, place, and time  Oriented to person  Oriented to place  Oriented to country, city, area, street and number  Oriented to time  Oriented to year, month, date, day and season  Attention: normal    Level of consciousness: alert    Gait, Coordination, and Reflexes     Coordination   Tandem walking coordination: abnormal (lost balance w/ a few steps)Abnormal heel walking lost balance            IMAGINING   10/13/2022 MRI BRAIN WITH AND WITHOUT CONTRAST     INDICATION: D33 3: Benign neoplasm of cranial nerves  Status post debulking of meningioma  Follow-up      COMPARISON:  Prior MRI August 26, 2021     TECHNIQUE:  Sagittal T1, axial T2, axial FLAIR, axial T1, axial Gradient, axial diffusion    Sagittal, axial T1 postcontrast   Axial bravo postcontrast with coronal reconstructions           IV Contrast:  13 mL of Gadobutrol injection (SINGLE-DOSE) IMAGE QUALITY:   Degraded by patient motion artifact      FINDINGS:     BRAIN PARENCHYMA:  Residual enhancing meningioma noted along the left tentorial leaflet anteriorly along the anterolateral aspect of the lauren which is in contact with the cisternal portion of the left 5th nerve  The size of residual meningioma tissue is   essentially stable from prior measuring approximately 2 0 x 1 3 cm  No parenchymal edema        There are no white matter changes in the cerebral hemispheres      Postcontrast imaging of the brain demonstrates no abnormal enhancement      VENTRICLES:  Normal for the patient's age      SELLA AND PITUITARY GLAND:  Normal      ORBITS:  Normal      PARANASAL SINUSES:  Normal      VASCULATURE:  Evaluation of the major intracranial vasculature demonstrates appropriate flow voids      CALVARIUM AND SKULL BASE:  Normal      EXTRACRANIAL SOFT TISSUES:  Normal      IMPRESSION:     Stable size of left sided meningioma which is located along the left petroclival ligament located anterior and lateral to the left hemipons  This mass is in contact with the left cisternal 5th nerve  Correlate for symptoms of trigeminal neuralgia      I spent 58 minutes directly with the patient during this visit  which greater than 50% of this time was spent in assessment, examination, impressions, reviewing imagining and recommendations for care  All questions were answered to his/her satisfaction, and contact information provided in the event additional questions arise   Patient acknowledged an understanding and agreement with plan

## 2022-12-22 NOTE — ASSESSMENT & PLAN NOTE
· As addressed in HPI   · Multiple complaints similar to preoperative   · Reports left sided V3 V2 branch cranial nerve 5 /trigeminal nerve symptoms of pain in left lower jaw , teeth  Up into left cheek , left lateral mandible into front of left ear   Has sensation left jaw is clicking  Imagining   10/13/2022 MRI Brain w/wo Stable size of left sided meningioma which is located along the left petroclival ligament located anterior and lateral to the left hemipons  This mass is in contact with the left cisternal 5th nerve  Correlate for symptoms of trigeminal neuralgia  8/26/2021 MRI brain w/wo IMPRESSION:1   Stable residual left-sided prepontine meningioma which has been partially resected, locally extending posteriorly along the left tentorial leaflet, intimately associated with the cisternal segment of the left 5th nerve, which courses along the medial   margin of the residual meningioma  No progressive enlargement  Continued clinical and imaging surveillance advised  2   Previously noted small subdural blood products have resorbed  3  No acute intracranial hemorrhage, acute infarction or new mass lesion          Plan  · Reviewed MRI imagining   · Ordered Physical therapy balance training  and assessment   · Ordered Referral to ophthalmology -[facility] specialist  P= [phone]  Fax [phone]---BVMQIK fields etc as detailed in HPI--complet before next visit   · Advised to f/u w/ neurology for EMU admit/moitoring  · Ordered MRI Brain w/wo for completion in 6 months then schedule f/u appointment with Dr Jyotsna Main as snpx

## 2023-05-05 ENCOUNTER — TELEPHONE (OUTPATIENT)
Dept: NEUROSURGERY | Facility: CLINIC | Age: 43
End: 2023-05-05

## 2023-05-05 NOTE — TELEPHONE ENCOUNTER
Patient LM on nurse line stating she is a [facility] patient she had surgery in 2021  She is  experiencing severe TMJ pain today going from her Jaw up to her temple and down into ear and neck on the left side  Started at like 2 or 3am which woke her up and she is still experiencing the pain now  Nothing working for pain  Can he do anything? Called patient back and I explained that she could be experiencing nerve pain or trigeminal neuralgia pain and I asked her if she ever experienced this before to which she replied no  I recommended calling her neurologist and/or pcp to see if they can start her on any medications but also encouraged her to present to the ED if her pain becomes unbearable  Patient was appreciative for the call back

## 2023-06-12 ENCOUNTER — TELEPHONE (OUTPATIENT)
Dept: NEUROSURGERY | Facility: CLINIC | Age: 43
End: 2023-06-12

## 2025-01-07 DIAGNOSIS — Z01.818 PRE-PROCEDURAL EXAMINATION: Primary | ICD-10-CM

## 2025-01-15 ENCOUNTER — DOCUMENTATION (OUTPATIENT)
Dept: PAIN MEDICINE | Facility: CLINIC | Age: 45
End: 2025-01-15

## 2025-01-21 DIAGNOSIS — D33.3 CEREBELLOPONTINE ANGLE TUMOR (HCC): Primary | ICD-10-CM

## 2025-01-24 ENCOUNTER — TELEPHONE (OUTPATIENT)
Facility: MEDICAL CENTER | Age: 45
End: 2025-01-24

## 2025-01-24 NOTE — TELEPHONE ENCOUNTER
2-18-25  AT [facility]        1-28-25 AT 100PM AT [facility]      Note in chart after scheduling VNS:   Topics covered in our conversation:  MRI scheduled on the above dates.  Reminded to get x-ray follow-up prior to MRI appt.    An email was also sent to Mary Crobin and John Bryan in Neurology

## (undated) DEVICE — BETADINE OINTMENT FOIL PACK

## (undated) DEVICE — SUPPLY FEE STD

## (undated) DEVICE — PROXIMATE PLUS MD MULTI-DIRECTIONAL RELEASE SKIN STAPLERS CONTAINS 35 STAINLESS STEEL STAPLES APPROXIMATE CLOSED DIMENSIONS: 6.9MM X 3.9MM WIDE: Brand: PROXIMATE

## (undated) DEVICE — GLOVE SRG BIOGEL 8

## (undated) DEVICE — CUSA® EXCEL 36KHZ CUSA® MANIFOLD TUBING SET: Brand: CUSA® EXCEL

## (undated) DEVICE — HEMOSTATIC MATRIX SURGIFLO 8ML W/THROMBIN

## (undated) DEVICE — RANEY SCALP CLIP STERILE: Brand: AESCULAP

## (undated) DEVICE — DISPOSABLE SLIM BIPOLAR FORCEPS, NON-STICK,: Brand: SPETZLER-MALIS

## (undated) DEVICE — MONITORING SPINAL IMPULSE CASE FEE

## (undated) DEVICE — ANTIBACTERIAL VIOLET BRAIDED (POLYGLACTIN 910), SYNTHETIC ABSORBABLE SUTURE: Brand: COATED VICRYL

## (undated) DEVICE — DRAPE FLUID WARMER (BIRD BATH)

## (undated) DEVICE — ADHESIVE SKIN HIGH VISCOSITY EXOFIN 1ML

## (undated) DEVICE — IV SET 15 DROP STERILE 0/Y GRAVITY

## (undated) DEVICE — SURGICEL 4 X 8

## (undated) DEVICE — SPONGE PVP SCRUB WING STERILE

## (undated) DEVICE — IV CATH INTROCAN 18G X 1 1/4 SAFETY

## (undated) DEVICE — SUT SILK 2-0 SH CR/8 18 IN C012D

## (undated) DEVICE — DRAPE INTESTINAL ISOLATION BAG

## (undated) DEVICE — DRAPE MICROSCOPE OPMI PENTERO

## (undated) DEVICE — INTENDED FOR TISSUE SEPARATION, AND OTHER PROCEDURES THAT REQUIRE A SHARP SURGICAL BLADE TO PUNCTURE OR CUT.: Brand: BARD-PARKER ® CARBON RIB-BACK BLADES

## (undated) DEVICE — SUT NUROLON 4-0 TF CR/8 18 IN C584D

## (undated) DEVICE — SURGIFOAM 8.5 X 12.5

## (undated) DEVICE — SUT MONOCRYL 2-0 SH 27 IN Y417H

## (undated) DEVICE — DRAPE CAMERA/LASER

## (undated) DEVICE — TOOL 9MH30 LEGEND 9CM 3MM MH: Brand: MIDAS REX

## (undated) DEVICE — Device: Brand: MEDEX

## (undated) DEVICE — PREP SURGICAL PURPREP 26ML

## (undated) DEVICE — PETRI DISH STERILE

## (undated) DEVICE — DURASEAL 5ML

## (undated) DEVICE — MARKER REFLECTIVE RADIOPAQUE SPHERE

## (undated) DEVICE — INTENDED FOR TISSUE SEPARATION, AND OTHER PROCEDURES THAT REQUIRE A SHARP SURGICAL BLADE TO PUNCTURE OR CUT.: Brand: BARD-PARKER SAFETY BLADES SIZE 15, STERILE

## (undated) DEVICE — TOOL F2/8TA23 LEGEND 8CM 2.3MM TAPER: Brand: MIDAS REX™

## (undated) DEVICE — SPECIMEN CONTAINER STERILE PEEL PACK

## (undated) DEVICE — PACK CRANIOTOMY PBDS RF

## (undated) DEVICE — MAYFIELD® DISPOSABLE ADULT SKULL PIN (PLASTIC BASE): Brand: MAYFIELD®

## (undated) DEVICE — CUSA® EXCEL 36KHZ 1.57MM MICROTIP™ CURVED EXTENDED TIP: Brand: CUSA® EXCEL

## (undated) DEVICE — GOWN,SLEEVE,STERILE,W/CSR WRAP,1/P: Brand: MEDLINE

## (undated) DEVICE — NEURO PATTIES 1/4 X 1/4

## (undated) DEVICE — DURASEAL MICROMYST APPLICATOR TIP

## (undated) DEVICE — SUT MONOCRYL PLUS 4-0 PS-2 18 IN MCP496G

## (undated) DEVICE — SYRINGE 10ML LL

## (undated) DEVICE — PAD GROUNDING ADULT

## (undated) DEVICE — GLOVE INDICATOR PI UNDERGLOVE SZ 8.5 BLUE

## (undated) DEVICE — TRAY FOLEY 16FR URIMETER SURESTEP

## (undated) DEVICE — DRAPE SHEET THREE QUARTER

## (undated) DEVICE — TELFA 1/2" X 3": Brand: TELFA

## (undated) DEVICE — Device: Brand: IQ SYSTEM

## (undated) DEVICE — 3M™ STERI-STRIP™ REINFORCED ADHESIVE SKIN CLOSURES, R1547, 1/2 IN X 4 IN (12 MM X 100 MM), 6 STRIPS/ENVELOPE: Brand: 3M™ STERI-STRIP™

## (undated) DEVICE — CUSA® EXCEL 36 KHZ CEM™ NOSECONE: Brand: CUSA® EXCEL

## (undated) DEVICE — 3M™ IOBAN™ 2 ANTIMICROBIAL INCISE DRAPE 6650EZ: Brand: IOBAN™ 2

## (undated) DEVICE — TIBURON SPLIT SHEET: Brand: CONVERTORS